# Patient Record
Sex: FEMALE | Race: WHITE | NOT HISPANIC OR LATINO | Employment: OTHER | URBAN - METROPOLITAN AREA
[De-identification: names, ages, dates, MRNs, and addresses within clinical notes are randomized per-mention and may not be internally consistent; named-entity substitution may affect disease eponyms.]

---

## 2018-01-08 ENCOUNTER — OFFICE VISIT CONVERTED (OUTPATIENT)
Dept: FAMILY MEDICINE CLINIC | Facility: CLINIC | Age: 73
End: 2018-01-08
Attending: FAMILY MEDICINE

## 2018-01-08 ENCOUNTER — CONVERSION ENCOUNTER (OUTPATIENT)
Dept: FAMILY MEDICINE CLINIC | Facility: CLINIC | Age: 73
End: 2018-01-08

## 2018-02-19 ENCOUNTER — OFFICE VISIT CONVERTED (OUTPATIENT)
Dept: PODIATRY | Facility: CLINIC | Age: 73
End: 2018-02-19
Attending: PODIATRIST

## 2018-07-17 ENCOUNTER — OFFICE VISIT CONVERTED (OUTPATIENT)
Dept: FAMILY MEDICINE CLINIC | Facility: CLINIC | Age: 73
End: 2018-07-17
Attending: FAMILY MEDICINE

## 2018-11-12 ENCOUNTER — OFFICE VISIT CONVERTED (OUTPATIENT)
Dept: OTOLARYNGOLOGY | Facility: CLINIC | Age: 73
End: 2018-11-12
Attending: OTOLARYNGOLOGY

## 2019-01-18 ENCOUNTER — OFFICE VISIT CONVERTED (OUTPATIENT)
Dept: FAMILY MEDICINE CLINIC | Facility: CLINIC | Age: 74
End: 2019-01-18
Attending: FAMILY MEDICINE

## 2019-03-01 ENCOUNTER — HOSPITAL ENCOUNTER (OUTPATIENT)
Dept: LAB | Facility: HOSPITAL | Age: 74
Discharge: HOME | End: 2019-03-01
Attending: FAMILY MEDICINE

## 2019-03-01 LAB
ANION GAP SERPL CALC-SCNC: 18 MMOL/L (ref 8–19)
BUN SERPL-MCNC: 16 MG/DL (ref 5–25)
BUN/CREAT SERPL: 29 {RATIO} (ref 6–20)
CALCIUM SERPL-MCNC: 9.2 MG/DL (ref 8.7–10.4)
CHLORIDE SERPL-SCNC: 102 MMOL/L (ref 99–111)
CONV CO2: 24 MMOL/L (ref 22–32)
CREAT UR-MCNC: 0.56 MG/DL (ref 0.5–0.9)
EST. AVERAGE GLUCOSE BLD GHB EST-MCNC: 186 MG/DL
GFR SERPLBLD BASED ON 1.73 SQ M-ARVRAT: >60 ML/MIN/{1.73_M2}
GLUCOSE SERPL-MCNC: 163 MG/DL (ref 65–99)
HBA1C MFR BLD: 8.1 % (ref 3.5–5.7)
OSMOLALITY SERPL CALC.SUM OF ELEC: 295 MOSM/KG (ref 273–304)
POTASSIUM SERPL-SCNC: 4.3 MMOL/L (ref 3.5–5.3)
SODIUM SERPL-SCNC: 140 MMOL/L (ref 135–147)
T4 FREE SERPL-MCNC: 1.4 NG/DL (ref 0.9–1.8)
TSH SERPL-ACNC: 2.18 M[IU]/L (ref 0.27–4.2)

## 2019-07-26 ENCOUNTER — OFFICE VISIT CONVERTED (OUTPATIENT)
Dept: FAMILY MEDICINE CLINIC | Facility: CLINIC | Age: 74
End: 2019-07-26
Attending: NURSE PRACTITIONER

## 2019-07-30 ENCOUNTER — HOSPITAL ENCOUNTER (OUTPATIENT)
Dept: LAB | Facility: HOSPITAL | Age: 74
Discharge: HOME | End: 2019-07-30
Attending: NURSE PRACTITIONER

## 2019-07-30 LAB
CHOLEST SERPL-MCNC: 170 MG/DL (ref 107–200)
CHOLEST/HDLC SERPL: 3.3 {RATIO} (ref 3–6)
EST. AVERAGE GLUCOSE BLD GHB EST-MCNC: 194 MG/DL
HBA1C MFR BLD: 8.4 % (ref 3.5–5.7)
HDLC SERPL-MCNC: 51 MG/DL (ref 40–60)
LDLC SERPL CALC-MCNC: 82 MG/DL (ref 70–100)
TRIGL SERPL-MCNC: 187 MG/DL (ref 40–150)
VLDLC SERPL-MCNC: 37 MG/DL (ref 5–37)

## 2019-10-02 ENCOUNTER — HOSPITAL ENCOUNTER (OUTPATIENT)
Dept: SLEEP MEDICINE | Facility: HOSPITAL | Age: 74
Discharge: HOME | End: 2019-10-02
Attending: INTERNAL MEDICINE

## 2019-10-02 ENCOUNTER — OUTSIDE FACILITY SERVICE (OUTPATIENT)
Dept: SLEEP MEDICINE | Facility: HOSPITAL | Age: 74
End: 2019-10-02

## 2019-10-02 PROCEDURE — 99203 OFFICE O/P NEW LOW 30 MIN: CPT | Performed by: INTERNAL MEDICINE

## 2019-11-11 ENCOUNTER — OFFICE VISIT CONVERTED (OUTPATIENT)
Dept: OTOLARYNGOLOGY | Facility: CLINIC | Age: 74
End: 2019-11-11
Attending: OTOLARYNGOLOGY

## 2020-01-22 ENCOUNTER — CONVERSION ENCOUNTER (OUTPATIENT)
Dept: FAMILY MEDICINE CLINIC | Facility: CLINIC | Age: 75
End: 2020-01-22

## 2020-01-22 ENCOUNTER — OFFICE VISIT CONVERTED (OUTPATIENT)
Dept: FAMILY MEDICINE CLINIC | Facility: CLINIC | Age: 75
End: 2020-01-22
Attending: NURSE PRACTITIONER

## 2020-02-21 ENCOUNTER — HOSPITAL ENCOUNTER (OUTPATIENT)
Dept: LAB | Facility: HOSPITAL | Age: 75
Discharge: HOME | End: 2020-02-21
Attending: NURSE PRACTITIONER

## 2020-02-21 LAB
ALBUMIN SERPL-MCNC: 4.2 G/DL (ref 3.5–5)
ALBUMIN/GLOB SERPL: 1.4 {RATIO} (ref 1.4–2.6)
ALP SERPL-CCNC: 106 U/L (ref 43–160)
ALT SERPL-CCNC: 33 U/L (ref 10–40)
ANION GAP SERPL CALC-SCNC: 21 MMOL/L (ref 8–19)
AST SERPL-CCNC: 26 U/L (ref 15–50)
BASOPHILS # BLD AUTO: 0.04 10*3/UL (ref 0–0.2)
BASOPHILS NFR BLD AUTO: 0.4 % (ref 0–3)
BILIRUB SERPL-MCNC: 0.35 MG/DL (ref 0.2–1.3)
BUN SERPL-MCNC: 13 MG/DL (ref 5–25)
BUN/CREAT SERPL: 22 {RATIO} (ref 6–20)
CALCIUM SERPL-MCNC: 9.4 MG/DL (ref 8.7–10.4)
CHLORIDE SERPL-SCNC: 102 MMOL/L (ref 99–111)
CONV ABS IMM GRAN: 0.03 10*3/UL (ref 0–0.2)
CONV CO2: 22 MMOL/L (ref 22–32)
CONV CREATININE URINE, RANDOM: 120.9 MG/DL (ref 10–300)
CONV IMMATURE GRAN: 0.3 % (ref 0–1.8)
CONV MICROALBUM.,U,RANDOM: 15.9 MG/L (ref 0–20)
CONV TOTAL PROTEIN: 7.1 G/DL (ref 6.3–8.2)
CREAT UR-MCNC: 0.6 MG/DL (ref 0.5–0.9)
DEPRECATED RDW RBC AUTO: 45.1 FL (ref 36.4–46.3)
EOSINOPHIL # BLD AUTO: 0.14 10*3/UL (ref 0–0.7)
EOSINOPHIL # BLD AUTO: 1.6 % (ref 0–7)
ERYTHROCYTE [DISTWIDTH] IN BLOOD BY AUTOMATED COUNT: 13.6 % (ref 11.7–14.4)
EST. AVERAGE GLUCOSE BLD GHB EST-MCNC: 197 MG/DL
GFR SERPLBLD BASED ON 1.73 SQ M-ARVRAT: >60 ML/MIN/{1.73_M2}
GLOBULIN UR ELPH-MCNC: 2.9 G/DL (ref 2–3.5)
GLUCOSE SERPL-MCNC: 183 MG/DL (ref 65–99)
HBA1C MFR BLD: 8.5 % (ref 3.5–5.7)
HCT VFR BLD AUTO: 46.4 % (ref 37–47)
HGB BLD-MCNC: 14.6 G/DL (ref 12–16)
LYMPHOCYTES # BLD AUTO: 3.94 10*3/UL (ref 1–5)
LYMPHOCYTES NFR BLD AUTO: 44.1 % (ref 20–45)
MCH RBC QN AUTO: 28.2 PG (ref 27–31)
MCHC RBC AUTO-ENTMCNC: 31.5 G/DL (ref 33–37)
MCV RBC AUTO: 89.6 FL (ref 81–99)
MICROALBUMIN/CREAT UR: 13.2 MG/G{CRE} (ref 0–35)
MONOCYTES # BLD AUTO: 0.67 10*3/UL (ref 0.2–1.2)
MONOCYTES NFR BLD AUTO: 7.5 % (ref 3–10)
NEUTROPHILS # BLD AUTO: 4.11 10*3/UL (ref 2–8)
NEUTROPHILS NFR BLD AUTO: 46.1 % (ref 30–85)
NRBC CBCN: 0 % (ref 0–0.7)
OSMOLALITY SERPL CALC.SUM OF ELEC: 297 MOSM/KG (ref 273–304)
PLATELET # BLD AUTO: 312 10*3/UL (ref 130–400)
PMV BLD AUTO: 9.1 FL (ref 9.4–12.3)
POTASSIUM SERPL-SCNC: 4 MMOL/L (ref 3.5–5.3)
RBC # BLD AUTO: 5.18 10*6/UL (ref 4.2–5.4)
SODIUM SERPL-SCNC: 141 MMOL/L (ref 135–147)
TSH SERPL-ACNC: 2.46 M[IU]/L (ref 0.27–4.2)
WBC # BLD AUTO: 8.93 10*3/UL (ref 4.8–10.8)

## 2020-05-27 ENCOUNTER — OFFICE VISIT CONVERTED (OUTPATIENT)
Dept: FAMILY MEDICINE CLINIC | Facility: CLINIC | Age: 75
End: 2020-05-27
Attending: NURSE PRACTITIONER

## 2020-06-10 ENCOUNTER — HOSPITAL ENCOUNTER (OUTPATIENT)
Dept: LAB | Facility: HOSPITAL | Age: 75
Discharge: HOME | End: 2020-06-10
Attending: NURSE PRACTITIONER

## 2020-06-10 LAB
CHOLEST SERPL-MCNC: 152 MG/DL (ref 107–200)
CHOLEST/HDLC SERPL: 3.3 {RATIO} (ref 3–6)
EST. AVERAGE GLUCOSE BLD GHB EST-MCNC: 217 MG/DL
HBA1C MFR BLD: 9.2 % (ref 3.5–5.7)
HDLC SERPL-MCNC: 46 MG/DL (ref 40–60)
LDLC SERPL CALC-MCNC: 79 MG/DL (ref 70–100)
TRIGL SERPL-MCNC: 136 MG/DL (ref 40–150)
VLDLC SERPL-MCNC: 27 MG/DL (ref 5–37)

## 2020-08-06 ENCOUNTER — OFFICE VISIT CONVERTED (OUTPATIENT)
Dept: FAMILY MEDICINE CLINIC | Facility: CLINIC | Age: 75
End: 2020-08-06
Attending: NURSE PRACTITIONER

## 2020-09-30 ENCOUNTER — HOSPITAL ENCOUNTER (OUTPATIENT)
Dept: LAB | Facility: HOSPITAL | Age: 75
Discharge: HOME | End: 2020-09-30
Attending: NURSE PRACTITIONER

## 2020-09-30 LAB
ALBUMIN SERPL-MCNC: 4.2 G/DL (ref 3.5–5)
ALBUMIN/GLOB SERPL: 1.5 {RATIO} (ref 1.4–2.6)
ALP SERPL-CCNC: 104 U/L (ref 43–160)
ALT SERPL-CCNC: 33 U/L (ref 10–40)
ANION GAP SERPL CALC-SCNC: 17 MMOL/L (ref 8–19)
AST SERPL-CCNC: 26 U/L (ref 15–50)
BILIRUB SERPL-MCNC: 0.29 MG/DL (ref 0.2–1.3)
BUN SERPL-MCNC: 13 MG/DL (ref 5–25)
BUN/CREAT SERPL: 20 {RATIO} (ref 6–20)
CALCIUM SERPL-MCNC: 9.4 MG/DL (ref 8.7–10.4)
CHLORIDE SERPL-SCNC: 103 MMOL/L (ref 99–111)
CONV CO2: 24 MMOL/L (ref 22–32)
CONV TOTAL PROTEIN: 7 G/DL (ref 6.3–8.2)
CREAT UR-MCNC: 0.64 MG/DL (ref 0.5–0.9)
EST. AVERAGE GLUCOSE BLD GHB EST-MCNC: 186 MG/DL
GFR SERPLBLD BASED ON 1.73 SQ M-ARVRAT: >60 ML/MIN/{1.73_M2}
GLOBULIN UR ELPH-MCNC: 2.8 G/DL (ref 2–3.5)
GLUCOSE SERPL-MCNC: 153 MG/DL (ref 65–99)
HBA1C MFR BLD: 8.1 % (ref 3.5–5.7)
OSMOLALITY SERPL CALC.SUM OF ELEC: 293 MOSM/KG (ref 273–304)
POTASSIUM SERPL-SCNC: 3.8 MMOL/L (ref 3.5–5.3)
SODIUM SERPL-SCNC: 140 MMOL/L (ref 135–147)
TSH SERPL-ACNC: 0.89 M[IU]/L (ref 0.27–4.2)

## 2020-11-02 ENCOUNTER — HOSPITAL ENCOUNTER (OUTPATIENT)
Dept: SLEEP MEDICINE | Facility: HOSPITAL | Age: 75
Discharge: HOME | End: 2020-11-02
Attending: INTERNAL MEDICINE

## 2020-11-02 ENCOUNTER — OUTSIDE FACILITY SERVICE (OUTPATIENT)
Dept: SLEEP MEDICINE | Facility: HOSPITAL | Age: 75
End: 2020-11-02

## 2020-11-02 PROCEDURE — 99213 OFFICE O/P EST LOW 20 MIN: CPT | Performed by: INTERNAL MEDICINE

## 2021-01-19 ENCOUNTER — HOSPITAL ENCOUNTER (OUTPATIENT)
Dept: OTHER | Facility: HOSPITAL | Age: 76
Discharge: HOME | End: 2021-01-19
Attending: INTERNAL MEDICINE

## 2021-02-12 ENCOUNTER — HOSPITAL ENCOUNTER (OUTPATIENT)
Dept: VACCINE CLINIC | Facility: HOSPITAL | Age: 76
Discharge: HOME | End: 2021-02-12
Attending: INTERNAL MEDICINE

## 2021-03-18 ENCOUNTER — CONVERSION ENCOUNTER (OUTPATIENT)
Dept: FAMILY MEDICINE CLINIC | Facility: CLINIC | Age: 76
End: 2021-03-18

## 2021-03-18 ENCOUNTER — OFFICE VISIT CONVERTED (OUTPATIENT)
Dept: FAMILY MEDICINE CLINIC | Facility: CLINIC | Age: 76
End: 2021-03-18
Attending: NURSE PRACTITIONER

## 2021-03-18 LAB
AMPHET UR QL CFM: NEGATIVE
BARBITURATES UR QL: NEGATIVE
BENZODIAZ UR QL SCN: NEGATIVE
CONV AMP/METHAMP UR: NEGATIVE
CONV COCAINE, UR: NEGATIVE
MDMA UR QL SCN: NEGATIVE
METHADONE UR QL SCN: NEGATIVE
OPIATES UR QL SCN: NEGATIVE
OXYCODONE UR QL SCN: NEGATIVE
PCP UR QL: NEGATIVE
THC SERPLBLD CFM-MCNC: NEGATIVE NG/ML

## 2021-04-01 ENCOUNTER — HOSPITAL ENCOUNTER (OUTPATIENT)
Dept: LAB | Facility: HOSPITAL | Age: 76
Discharge: HOME | End: 2021-04-01
Attending: NURSE PRACTITIONER

## 2021-04-01 LAB
25(OH)D3 SERPL-MCNC: 34.6 NG/ML (ref 30–100)
ALBUMIN SERPL-MCNC: 4.3 G/DL (ref 3.5–5)
ALBUMIN/GLOB SERPL: 1.3 {RATIO} (ref 1.4–2.6)
ALP SERPL-CCNC: 98 U/L (ref 43–160)
ALT SERPL-CCNC: 24 U/L (ref 10–40)
ANION GAP SERPL CALC-SCNC: 14 MMOL/L (ref 8–19)
AST SERPL-CCNC: 21 U/L (ref 15–50)
BASOPHILS # BLD AUTO: 0.05 10*3/UL (ref 0–0.2)
BASOPHILS NFR BLD AUTO: 0.5 % (ref 0–3)
BILIRUB SERPL-MCNC: 0.52 MG/DL (ref 0.2–1.3)
BUN SERPL-MCNC: 16 MG/DL (ref 5–25)
BUN/CREAT SERPL: 27 {RATIO} (ref 6–20)
CALCIUM SERPL-MCNC: 9.4 MG/DL (ref 8.7–10.4)
CHLORIDE SERPL-SCNC: 105 MMOL/L (ref 99–111)
CHOLEST SERPL-MCNC: 171 MG/DL (ref 107–200)
CHOLEST/HDLC SERPL: 2.7 {RATIO} (ref 3–6)
CONV ABS IMM GRAN: 0.03 10*3/UL (ref 0–0.2)
CONV CO2: 26 MMOL/L (ref 22–32)
CONV CREATININE URINE, RANDOM: 93.5 MG/DL (ref 10–300)
CONV IMMATURE GRAN: 0.3 % (ref 0–1.8)
CONV MICROALBUM.,U,RANDOM: <12 MG/L (ref 0–20)
CONV TOTAL PROTEIN: 7.5 G/DL (ref 6.3–8.2)
CREAT UR-MCNC: 0.6 MG/DL (ref 0.5–0.9)
DEPRECATED RDW RBC AUTO: 44.1 FL (ref 36.4–46.3)
EOSINOPHIL # BLD AUTO: 0.09 10*3/UL (ref 0–0.7)
EOSINOPHIL # BLD AUTO: 0.9 % (ref 0–7)
ERYTHROCYTE [DISTWIDTH] IN BLOOD BY AUTOMATED COUNT: 13.7 % (ref 11.7–14.4)
EST. AVERAGE GLUCOSE BLD GHB EST-MCNC: 166 MG/DL
GFR SERPLBLD BASED ON 1.73 SQ M-ARVRAT: >60 ML/MIN/{1.73_M2}
GLOBULIN UR ELPH-MCNC: 3.2 G/DL (ref 2–3.5)
GLUCOSE SERPL-MCNC: 133 MG/DL (ref 65–99)
HBA1C MFR BLD: 7.4 % (ref 3.5–5.7)
HCT VFR BLD AUTO: 47.7 % (ref 37–47)
HDLC SERPL-MCNC: 64 MG/DL (ref 40–60)
HGB BLD-MCNC: 15.3 G/DL (ref 12–16)
LDLC SERPL CALC-MCNC: 82 MG/DL (ref 70–100)
LYMPHOCYTES # BLD AUTO: 4.02 10*3/UL (ref 1–5)
LYMPHOCYTES NFR BLD AUTO: 41.9 % (ref 20–45)
MCH RBC QN AUTO: 28.2 PG (ref 27–31)
MCHC RBC AUTO-ENTMCNC: 32.1 G/DL (ref 33–37)
MCV RBC AUTO: 87.8 FL (ref 81–99)
MICROALBUMIN/CREAT UR: 12.8 MG/G{CRE} (ref 0–35)
MONOCYTES # BLD AUTO: 0.64 10*3/UL (ref 0.2–1.2)
MONOCYTES NFR BLD AUTO: 6.7 % (ref 3–10)
NEUTROPHILS # BLD AUTO: 4.77 10*3/UL (ref 2–8)
NEUTROPHILS NFR BLD AUTO: 49.7 % (ref 30–85)
NRBC CBCN: 0 % (ref 0–0.7)
OSMOLALITY SERPL CALC.SUM OF ELEC: 295 MOSM/KG (ref 273–304)
PLATELET # BLD AUTO: 341 10*3/UL (ref 130–400)
PMV BLD AUTO: 8.9 FL (ref 9.4–12.3)
POTASSIUM SERPL-SCNC: 3.8 MMOL/L (ref 3.5–5.3)
RBC # BLD AUTO: 5.43 10*6/UL (ref 4.2–5.4)
SODIUM SERPL-SCNC: 141 MMOL/L (ref 135–147)
TRIGL SERPL-MCNC: 123 MG/DL (ref 40–150)
TSH SERPL-ACNC: 1.05 M[IU]/L (ref 0.27–4.2)
VIT B12 SERPL-MCNC: 419 PG/ML (ref 211–911)
VLDLC SERPL-MCNC: 25 MG/DL (ref 5–37)
WBC # BLD AUTO: 9.6 10*3/UL (ref 4.8–10.8)
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
## 2021-05-13 NOTE — PROGRESS NOTES
Progress Note      Patient Name: Belem Acosta   Patient ID: [id number]   Sex: Female   YOB: 1945    Primary Care Provider: Elena BRUMFIELD   Referring Provider: Elena BRUMFIELD    Visit Date: May 27, 2020    Provider: BRISSA Carey   Location: [facility]   Location Address: [address] [place] [address], [address]  [place]  [id number]   Location Phone: [phone]          Chief Complaint  · Annual Wellness Exam      History Of Present Illness  The patient is a 74 year old /White female who is presenting for evaluation via video conferencing for her Annual Wellness Visit. Verbal consent obtained before beginning visit.   The following staff were present during the visit: ELENA BRUMFIELD   Her Primary Care Provider is Elena BRUMFIELD. Her comprehensive Care Team list, including suppliers, has been updated on the Facesheet. Her medical/family history, height, weight, BMI, and blood pressure have been reviewed and are in the chart. The Health Risk Assessment has been completed and scanned in the chart.   Medications are listed in the medication list.   The active problem list includes: Ankle pain, Anxiety, Arthritis, Bunion, Corns and callus, Diabetes Mellitus, Type II, Hammertoe, Hyperglycemia, Hyperlipidemia, Hypertension, Hypothyroidism, Impaired fasting glucose, Panic Disorder, and Sleep apnea   The patient does not have a history of substance use.   Patient reports her diet is adequate.   The Mini-Cog has been administered and is scanned in chart. The results are negative. Her cognitive function is without limitation.   A hearing loss screen was completed today and the result is negative.   Patient does not have any risk factors for depression. Patient completed the PHQ-9 today and it has been scanned in the chart. The total score is 5-9.   The Get Up and Go screen was administered today and the result is negative.   The Cortes Index of [place] in ADLs indicated full  function (score of 6).   A Falls Risk Assessment has been completed, including a review of home fall hazards and medication review.   Overall, the patient's functional ability is noted by this provider to be within normal limits. Her level of safety is noted to be within normal limits. Her balance/gait is within normal limits. There have been no falls in the past year. Patient-specific home safety recommendations have been reviewed and a copy has been given to patient.   She denies issues with leaking urine.   There are no additional risk factors identified.   Living Will/Advanced Directive has not previously been completed.   Personalized health advice was given to the patient and a written health screening schedule was established; see Plan for details.   Belem Acosta is a 74 year old /White female who presents for evaluation and treatment of:      mammogram keila tomorrow.    colonoscopy-Feb 23, 2017 -small polyp-she will need another one in 10yrs.    last eye exam-overdue-it was cancelled due to COVID-will shanique in July 7, 2020 w/.     due A1C -will order today.       Past Medical History  Disease Name Date Onset Notes   Ankle pain --  --    Anxiety --  --    Arthritis --  --    Bone Density Screening 03/2019 NORMAL   Bunion --  --    Corns and callus --  --    Diabetes Mellitus, Type II --  --    Hammertoe --  --    Hyperglycemia --  --    Hyperlipidemia --  --    Hypertension --  --    Hypothyroidism --  --    Impaired fasting glucose 08/19/2014 --    Panic Disorder --  --    Pap smear for cervical cancer screening 2019 NL PER PT   Screening Mammogram 03/2019 NORMAL   Sleep apnea 08/26/2015 --          Past Surgical History  Procedure Name Date Notes   carpal tunnel 2002, 2004 --    Cataract exctraction with lens implant 2012 right eye   Cholecystectomy 1982 --    Colonoscopy 02/2017 POLYPS, TUBULAR ADENOMA   Repair of rotator cuff, left 06/2013 --    Thyroidectomy-partial 1997 Rt  thyroid d/t growth-bengin   Wrist 2008 --          Medication List  Name Date Started Instructions   Allegra 180 mg oral tablet  take 1 tablet (180 mg) by oral route once daily   Aspirin Low Dose 81 mg oral tablet,delayed release (DR/EC)  take 1 tablet (81 mg) by oral route once daily   bisoprolol fumarate 5 mg oral tablet 01/23/2020 TAKE ONE-HALF TABLET (2.5 MG) BY ORAL ROUTE ONCE DAILY FOR 90 DAYS   Farxiga 10 mg oral tablet 01/23/2020 take 1 tablet (10 mg) by oral route once daily in the morning for 90 days   fluticasone propionate 50 mcg/actuation nasal spray,suspension 01/23/2020 SPRAY 2 SPRAYS (100 MCG) IN EACH NOSTRIL BY INTRANASAL ROUTE ONCE DAILY AS NEEDED FOR 90 DAYS   ipratropium bromide 42 mcg (0.06 %) nasal spray,non-aerosol 01/23/2020 SPRAY 2 SPRAYS IN EACH NOSTRIL BY INTRANASAL ROUTE 3 TIMES PER DAY PRN   levothyroxine 112 mcg oral tablet 01/23/2020 TAKE 1 TABLET DAILY IN THE MORNING   Lumigan 0.01 % ophthalmic drops  instill 1 drop in affected eye once a day (at bedtime)   OneTouch Verio miscellaneous strip 01/08/2018 use as directed   Onglyza 5 mg oral tablet 01/23/2020 TAKE 1 TABLET (5 MG) BY ORAL ROUTE ONCE DAILY FOR 90 DAYS   paroxetine HCl 20 mg oral tablet 01/23/2020 TAKE 1 TABLET DAILY AT BEDTIME for 90 days   simvastatin 40 mg oral tablet 01/23/2020 TAKE 1 TABLET DAILY   Xanax 0.25 mg oral tablet 01/09/2017 take 0.5 tablet by oral route daily as needed for 30 days         Allergy List  Allergen Name Date Reaction Notes   NO KNOWN DRUG ALLERGIES --  --  --        Allergies Reconciled  Family Medical History  Disease Name Relative/Age Notes   Emphysema Father/   --    Pancreatic Neoplasm, Malignant Brother/   --    Stroke Aunt/   --    Heart Disease Aunt/  Father/  Grandfather (maternal)/  Grandmother (maternal)/  Mother/   --    Hyperlipidemia Brother/  Sister/   --    Congestive Heart Failure Father/  Mother/   --    Hypertension Mother/   --    Diabetes, unspecified type Mother/   --           Reproductive History  Menstrual   Age Menarche: 13   Pregnancy Summary   Total Pregnancies: 2 Full Term: 2 Premature: 0   Ab Induced: 0 Ab Spontaneous: 0 Ectopics: 0   Multiples: 0 Livin         Social History  Finding Status Start/Stop Quantity Notes   Alcohol Never --/-- --  2020 - 2020 - 2019 -     --  --/-- --  --    Moderate Amount of Exercise (1-3 times weekly) --  --/-- --  --    No known infection risk --  --/-- --  --    Tobacco Never --/-- --  --          Immunizations  NameDate Admin Mfg Trade Name Lot Number Route Inj VIS Given VIS Publication   [id number] PMC BOOSTRIX [id number] IM LD 2013    Comments: Pt tolerated well   Hepatitis A02019 NE Not Entered  NE NE 2019    Comments: PER PATIENT [facility].   Hepatitis A02019 NE Not Entered  NE NE 2019    Comments: PER PT AT [facility].   Vkahzekpk20/2019 SKB Fluzone Quadrivalent  NE NE 2020    Comments: [facility]   Bziemejxx28/2018 NE Not Entered  NE NE 2019    Comments: PER PATIENT   Dnrynarax49/28/2013 NE Not Entered [id number] IM NE 2013   Comments:    Dprkbsitcacu35/09/2013 PFR PREVNAR 13 [id number] IM RD 2013 10/06/2009   Comments: Pt tolerated well   Td04 NE Not Entered  IM NE 2013   Comments:    Tdap2016 NE Not Entered  NE NE 2019    Comments: PER PATIENT         Review of Systems  · Constitutional  o Denies  o : fatigue, night sweats  · Eyes  o Denies  o : double vision, blurred vision  · HENT  o Denies  o : vertigo, recent head injury  · Breasts  o Denies  o : abnormal changes in breast size, additional breast symptoms except as noted in the HPI  · Cardiovascular  o Denies  o : chest pain, irregular heart beats  · Respiratory  o Denies  o : shortness of breath, productive cough  · Gastrointestinal  o Denies  o : nausea, vomiting  · Genitourinary  o Denies  o : dysuria, urinary  retention  · Integument  o Denies  o : hair growth change, new skin lesions  · Neurologic  o Denies  o : altered mental status, seizures  · Musculoskeletal  o Denies  o : joint swelling, limitation of motion  · Endocrine  o Denies  o : cold intolerance, heat intolerance  · Heme-Lymph  o Denies  o : petechiae, lymph node enlargement or tenderness  · Allergic-Immunologic  o Denies  o : frequent illnesses          Assessment  · Encounter for Medicare annual wellness exam     V70.0/Z00.00  · Screening for depression     V79.0/Z13.89  · Screening for alcoholism     V79.1/Z13.39  · Screening for alcoholism     V79.1/Z13.89      Plan  · Orders  o Falls Risk Assessment Completed (3288F) - V70.0/Z00.00 - 05/27/2020  o Brief hearing screening (written) [facility] () - V70.0/Z00.00 - 05/27/2020  o Presence or absence of urinary incontinence assessed (RODRIGO) (1090F) - V70.0/Z00.00 - 05/27/2020  o Annual depression screening using the PHQ-9 tool, 15 minutes (, 09665) - V79.0/Z13.89 - 05/27/2020  o Annual alcohol screening using the AUDIT-C tool, 15 minutes [facility] (96962, ) - V79.1/Z13.39 - 05/27/2020  o ACO-39: Current medications updated and reviewed () - - 05/27/2020  · Medications  o Medications have been Reconciled  o Transition of Care or Provider Policy  · Instructions  o Health Risk Assessment has been reviewed with the patient.  o Written health screening schedule for next 5-10 years was established with patient; information scanned in chart and given/mailed to patient.  o Fall prevention methods discussed and a copy of recommendations given/mailed to patient.  o Depression Screen completed and scanned into the EMR under the designated folder within the patient's documents.  o Today's PHQ-9 result is _5__  o Audit-C Questionnaire completed and scanned into the EMR under the designated folder within the patient's documents.  o Audit-C score of 0-4 - Negative Screen - Brief Discussion  o Take all medications as  prescribed/directed.  o Patient was educated/instructed on their diagnosis, treatment and medications prior to discharge from the clinic today.            Electronically Signed by: BRISSA Carey -Author on May 31, 2020 10:46:03 AM

## 2021-05-13 NOTE — PROGRESS NOTES
Progress Note      Patient Name: Belem Acosta   Patient ID: [id number]   Sex: Female   YOB: 1945    Primary Care Provider: Elena BRUMFIELD   Referring Provider: Elena BRUMFIELD    Visit Date: May 27, 2020    Provider: BRISSA Carey   Location: [facility]   Location Address: [address] [place] [address], [address]  [place]  [id number]   Location Phone: [phone]          Chief Complaint  · F/U      History Of Present Illness  Video Conferencing Visit  Belem Acosta is a 74 year old /White female who is presenting for evaluation via video conferencing via DUO VIDEO. Verbal consent obtained before beginning visit.   The following staff were present during this visit: Elena BRUMFIELD   Belem Acosta is a 74 year old /White female who presents for evaluation and treatment of:      Patient is here today on video call for a follow up.    reports it has been a hard year-it will be a yr Sunday since her  Chase passed away. States she is having more good days than bad days. She is moving intor her condo next week. States she loves her new condo.     DM-blood sugars running 169 today. States it was going down but it inched up a little since COVID. Reports her glucose elevates if she does not sleep well. Reports mostly she gets more reg sleep. She may trouble sleeping 1-2 nights per wk.     hypothyroid- denies issues w/thyroid. Last tsh wnl.    depression-doing well on current dose of paroxetine.    anxiety-she has not used her Xanax only 2-3 times this yr.     htn-b/p runs around 120-130's/70's.     hyperlipidemia-denies myopathies from statin.    arthritis-reports it is under good control.     allegra-she has trouble w/tree pollen this yr-she is taking allegra which is managing her symptoms.     mammogram keila tomorrow.    colonoscopy-Feb 23, 2017 -small polyp-she will need another one in 10yrs.    last eye exam-overdue-it was cancelled due to COVID-will shanique  in July 7, 2020 w/.     30 min spent via Duo video.           Past Medical History  Disease Name Date Onset Notes   Ankle pain --  --    Anxiety --  --    Arthritis --  --    Bone Density Screening 03/2019 NORMAL   Bunion --  --    Corns and callus --  --    Diabetes Mellitus, Type II --  --    Hammertoe --  --    Hyperglycemia --  --    Hyperlipidemia --  --    Hypertension --  --    Hypothyroidism --  --    Impaired fasting glucose 08/19/2014 --    Panic Disorder --  --    Pap smear for cervical cancer screening 2019 NL PER PT   Screening Mammogram 03/2019 NORMAL   Sleep apnea 08/26/2015 --          Past Surgical History  Procedure Name Date Notes   carpal tunnel 2002, 2004 --    Cataract exctraction with lens implant 2012 right eye   Cholecystectomy 1982 --    Colonoscopy 02/2017 POLYPS, TUBULAR ADENOMA   Repair of rotator cuff, left 06/2013 --    Thyroidectomy-partial 1997 Rt thyroid d/t growth-bengin   Wrist 2008 --          Medication List  Name Date Started Instructions   Allegra 180 mg oral tablet  take 1 tablet (180 mg) by oral route once daily   Aspirin Low Dose 81 mg oral tablet,delayed release (/EC)  take 1 tablet (81 mg) by oral route once daily   bisoprolol fumarate 5 mg oral tablet 01/23/2020 TAKE ONE-HALF TABLET (2.5 MG) BY ORAL ROUTE ONCE DAILY FOR 90 DAYS   Farxiga 10 mg oral tablet 01/23/2020 take 1 tablet (10 mg) by oral route once daily in the morning for 90 days   fluticasone propionate 50 mcg/actuation nasal spray,suspension 01/23/2020 SPRAY 2 SPRAYS (100 MCG) IN EACH NOSTRIL BY INTRANASAL ROUTE ONCE DAILY AS NEEDED FOR 90 DAYS   ipratropium bromide 42 mcg (0.06 %) nasal spray,non-aerosol 01/23/2020 SPRAY 2 SPRAYS IN EACH NOSTRIL BY INTRANASAL ROUTE 3 TIMES PER DAY PRN   levothyroxine 112 mcg oral tablet 01/23/2020 TAKE 1 TABLET DAILY IN THE MORNING   Lumigan 0.01 % ophthalmic drops  instill 1 drop in affected eye once a day (at bedtime)   OneTouch Verio miscellaneous strip  2018 use as directed   Onglyza 5 mg oral tablet 2020 TAKE 1 TABLET (5 MG) BY ORAL ROUTE ONCE DAILY FOR 90 DAYS   paroxetine HCl 20 mg oral tablet 2020 TAKE 1 TABLET DAILY AT BEDTIME for 90 days   simvastatin 40 mg oral tablet 2020 TAKE 1 TABLET DAILY   Xanax 0.25 mg oral tablet 2017 take 0.5 tablet by oral route daily as needed for 30 days         Allergy List  Allergen Name Date Reaction Notes   NO KNOWN DRUG ALLERGIES --  --  --          Family Medical History  Disease Name Relative/Age Notes   Emphysema Father/   --    Pancreatic Neoplasm, Malignant Brother/   --    Stroke Aunt/   --    Heart Disease Aunt/  Father/  Grandfather (maternal)/  Grandmother (maternal)/  Mother/   --    Hyperlipidemia Brother/  Sister/   --    Congestive Heart Failure Father/  Mother/   --    Hypertension Mother/   --    Diabetes, unspecified type Mother/   --          Reproductive History  Menstrual   Age Menarche: 13   Pregnancy Summary   Total Pregnancies: 2 Full Term: 2 Premature: 0   Ab Induced: 0 Ab Spontaneous: 0 Ectopics: 0   Multiples: 0 Livin         Social History  Finding Status Start/Stop Quantity Notes   Alcohol Never --/-- --  2020 - 2020 - 2019 -     --  --/-- --  --    Moderate Amount of Exercise (1-3 times weekly) --  --/-- --  --    No known infection risk --  --/-- --  --    Tobacco Never --/-- --  --          Immunizations  NameDate Admin Mfg Trade Name Lot Number Route Inj VIS Given VIS Publication   [id number] PMC BOOSTRIX [id number] IM LD 2013   Comments: Pt tolerated well   Hepatitis A02019 NE Not Entered  NE NE 2019    Comments: PER PATIENT [facility].   Hepatitis A02019 NE Not Entered  NE NE 2019    Comments: PER PT AT [facility].   Eazmhcvqw77/2019 SKB Fluzone Quadrivalent  NE NE 2020    Comments: [facility] prescription shop   Orszhjmrj82/2018 NE Not Entered  NE NE 2019     Comments: PER PATIENT   Hzwytoxnt06/28/2013 NE Not Entered [id number] IM NE 12/09/2013 07/02/2012   Comments:    Bdjnxabwaecu13/09/2013 PFR PREVNAR 13 [id number] IM RD 12/09/2013 10/06/2009   Comments: Pt tolerated well   Td04/05/2006 NE Not Entered  IM NE 12/09/2013 01/24/2012   Comments:    Tdap01/01/2016 NE Not Entered  NE NE 07/26/2019    Comments: PER PATIENT         Review of Systems  · Constitutional  o Denies  o : fever, fatigue, weight loss, weight gain  · Cardiovascular  o Denies  o : lower extremity edema, claudication, chest pressure, palpitations  · Respiratory  o Denies  o : shortness of breath, wheezing, cough, hemoptysis, dyspnea on exertion  · Gastrointestinal  o Denies  o : nausea, vomiting, diarrhea, constipation, abdominal pain  · Psychiatric  o Admits  o : anxiety, depression  o Denies  o : suicidal ideation, homicidal ideation      Physical Examination  · Constitutional  o Appearance  o : alert, in no acute distress, well developed, well-nourished  · Skin and Subcutaneous Tissue  o General Inspection  o : no rashes, normal skin color, warm and dry  · Neurologic  o Mental Status Examination  o : alert and oriented to time, place, and person. Gait and Station: normal gait, able to stand without difficulty  · Psychiatric  o Judgement and Insight  o : judgment and insight intact  o Mood and Affect  o : normal mood and affect          Assessment  · Allergic rhinitis due to allergen     477.9/J30.9  Admits to tree pollen flared her allergies earlier this year but her symptoms are under control at this time with Allegra.   · Anxiety disorder     300.00/F41.9  Anxiety under much better control-admits she is only had to use Xanax 2-3 times this year.  · Depression     311/F32.9  Mood stable on paroxetine -continue current dose.  · Hyperlipidemia     272.4/E78.5  denies myopathies from statin. Ordered lipid today.  · Hypothyroidism     244.9/E03.9  denies issues w/thyroid. Last tsh wnl. Ordered repeat  today.  · Hypertension     401.9/I10  Blood pressure within goal-continue current antihypertensives.  · Arthritis     V13.4/V13.4  Reports arthritis symptoms under good control at this time.  · Diabetes Mellitus, Type II     250.00/E11.9  Blood sugar 169 this morning. Reports since COVID her blood sugars have slowly creeped up. Also complains of insomnia at time-discussed relation of lack of sleep to increase glucose levels-ordered A1c today.       Plan  · Orders  o Hgb A1c [facility] (59346) - 250.00/E11.9 - 05/27/2020  o Lipid Panel [facility] (13195) - 272.4/E78.5 - 05/27/2020  o ACO-39: Current medications updated and reviewed () - - 05/27/2020  o ACO-14: Influenza immunization administered or previously received () - - 05/27/2020  · Medications  o Medications have been Reconciled  o Transition of Care or Provider Policy  · Instructions  o Advised that cheeses and other sources of dairy fats, animal fats, fast food, and the extras (candy, pastries, pies, doughnuts and cookies) all contain LDL raising nutrients. Advised to increase fruits, vegetables, whole grains, and to monitor portion sizes.   o Take all medications as prescribed/directed.  o Patient was educated/instructed on their diagnosis, treatment and medications prior to discharge from the clinic today.  o Call the office with any concerns or questions.  o Discussed Covid-19 precautions including, but not limited to, social distancing, avoid touching your face, and hand washing.   o 2 month follow up.            Electronically Signed by: BRISSA Carey -Author on May 31, 2020 11:03:14 AM

## 2021-05-14 VITALS
BODY MASS INDEX: 39.91 KG/M2 | WEIGHT: 225.25 LBS | HEIGHT: 63 IN | DIASTOLIC BLOOD PRESSURE: 57 MMHG | OXYGEN SATURATION: 96 % | SYSTOLIC BLOOD PRESSURE: 134 MMHG | HEART RATE: 80 BPM

## 2021-05-14 NOTE — PROGRESS NOTES
Progress Note      Patient Name: Belem Acosta   Patient ID: [id number]   Sex: Female   YOB: 1945    Primary Care Provider: Elena BRUMFIELD   Referring Provider: Elena BRUMFIELD    Visit Date: March 18, 2021    Provider: BRISSA Carey   Location: [facility]   Location Address: [address] [place] [address], [address]  [place], [place]  [id number]   Location Phone: [phone]          Chief Complaint  · 6 M F/U      History Of Present Illness  Belem Acosta is a 75 year old /White female who presents for evaluation and treatment of:      Patient is here today for 6 months follow up.    States he have been having a lot of sinus pressure and itchy eye lately. Says she is outside a lot. States she got her Covid-19 vaccines by [employer] with no side effects.     HTN-BP normal at home.    DM-BS runs in 130s-140s. Last A1C was 8.1%-her fasting blood sugars have greatly improved expect next A1C to be below 7%-ordered today as well as urine micro.    anxiety-Need refill on Xanax, UDS, Miguel and Narcotic Consent-all updated and refilled. States she is rarely having to use the Xanax now.    States she sleeps so much better-states if she does not sleep well it affects her blood sugar.    states her best friend was dx w/glioblatoma.       Past Medical History  Disease Name Date Onset Notes   Ankle pain --  --    Anxiety --  --    Arthritis --  --    Bone Density Screening 03/2019 NORMAL   Bunion --  --    Corns and callus --  --    Diabetes Mellitus, Type II --  --    Hammertoe --  --    Hyperglycemia --  --    Hyperlipidemia --  --    Hypertension --  --    Hypothyroidism --  --    Impaired fasting glucose 08/19/2014 --    Panic Disorder --  --    Pap smear for cervical cancer screening 2019 NL PER PT SEES DR[place] PALOMINO [place]   Screening Mammogram 05/2020 NORMAL DR. APLOMINO [place]   Sensorineural hearing loss (SNHL) of both ears --  --    Sleep apnea  "08/26/2015 --          Past Surgical History  Procedure Name Date Notes   carpal tunnel 2002, 2004 --    Cataract exctraction with lens implant 2012 right eye   Cholecystectomy 1982 --    Colonoscopy 02/2017 POLYPS, TUBULAR ADENOMA   Repair of rotator cuff, left 06/2013 --    Thyroidectomy-partial 1997 Rt thyroid d/t growth-bengin   Wrist 2008 --          Medication List  Name Date Started Instructions   Allegra 180 mg oral tablet  take 1 tablet (180 mg) by oral route once daily   Aspirin Low Dose 81 mg oral tablet,delayed release (DR/EC)  take 1 tablet (81 mg) by oral route once daily   BD Ultra-Fine [place] Pen Needle 32 gauge x 5/32\" miscellaneous needle 09/21/2020 USE AS DIRECTED   bisoprolol fumarate 5 mg oral tablet 03/18/2021 TAKE ONE-HALF TABLET (2.5 MG) BY ORAL ROUTE ONCE DAILY FOR 90 DAYS   Farxiga 10 mg oral tablet 03/18/2021 take 1 tablet (10 mg) by oral route once daily in the morning for 90 days   fluticasone propionate 50 mcg/actuation nasal spray,suspension 01/23/2020 SPRAY 2 SPRAYS (100 MCG) IN EACH NOSTRIL BY INTRANASAL ROUTE ONCE DAILY AS NEEDED FOR 90 DAYS   ipratropium bromide 42 mcg (0.06 %) nasal spray,non-aerosol 03/18/2021 SPRAY 2 SPRAYS IN EACH NOSTRIL BY INTRANASAL ROUTE 3 TIMES PER DAY PRN   levothyroxine 112 mcg oral tablet 03/02/2021 TAKE 1 TABLET DAILY IN THE MORNING   Lumigan 0.01 % ophthalmic drops  instill 1 drop in affected eye once a day (at bedtime)   OneTouch Verio miscellaneous strip 01/08/2018 use as directed   paroxetine HCl 20 mg oral tablet 03/02/2021 TAKE 1 TABLET DAILY AT BEDTIME for 90 days   pen needle, diabetic 32 gauge x 5/32\" miscellaneous needle 06/22/2020 use as directed   simvastatin 40 mg oral tablet 03/18/2021 TAKE 1 TABLET DAILY   Toujeo Max U-300 SoloStar 300 unit/mL (3 mL) subcutaneous insulin pen 03/18/2021 inject 30 units by subcutaneous route qd DX E11.9   Xanax 0.25 mg oral tablet 03/18/2021 take 0.5 tablet by oral route daily as needed for 30 days "         Allergy List  Allergen Name Date Reaction Notes   metformin --  --  --        Allergies Reconciled  Family Medical History  Disease Name Relative/Age Notes   Emphysema Father/   --    Pancreatic Neoplasm, Malignant Brother/   --    Stroke Aunt/   --    Heart Disease Aunt/  Father/  Grandfather (maternal)/  Grandmother (maternal)/  Mother/   --    Hyperlipidemia Brother/  Sister/   --    Congestive Heart Failure Father/  Mother/   --    Hypertension Mother/   --    Diabetes, unspecified type Mother/   --          Reproductive History  Menstrual   Age Menarche: 13   Pregnancy Summary   Total Pregnancies: 2 Full Term: 2 Premature: 0   Ab Induced: 0 Ab Spontaneous: 0 Ectopics: 0   Multiples: 0 Livin         Social History  Finding Status Start/Stop Quantity Notes   Alcohol Never --/-- --  2021 - 2020 - 2020 - 2020 - 2019 -     --  --/-- --  --    Moderate Amount of Exercise (1-3 times weekly) --  --/-- --  --    No known infection risk --  --/-- --  --    Tobacco Never --/-- --  --          Immunizations  NameDate Admin Mfg Trade Name Lot Number Route Inj VIS Given VIS Publication   [id number] PMC BOOSTRIX [id number] IM LD 2013    Comments: Pt tolerated well   Hepatitis A02019 NE Not Entered  NE NE 2019    Comments: PER PATIENT [facility].   Hepatitis A02019 NE Not Entered  NE NE 2019    Comments: PER PT AT [facility] PRES[facility].   Xrsofojbn12/2019 SKB Fluzone Quadrivalent  NE NE 2020    Comments: [facility]   Xnibthwrjjsc20/09/2013 PFR PREVNAR 13 [id number] IM RD 2013 10/06/2009   Comments: Pt tolerated well   Td2006 NE Not Entered  IM NE 2013   Comments:    Tdap2016 NE Not Entered  NE NE 2019    Comments: PER PATIENT         Review of Systems  · Constitutional  o Denies  o : fatigue, night sweats  · Eyes  o Admits  o : eye discomfort  o Denies  o : double vision, blurred  "vision  · HENT  o Admits  o : sinus pain, nasal congestion, nasal discharge, postnasal drip  o Denies  o : vertigo, recent head injury  · Breasts  o Denies  o : abnormal changes in breast size, additional breast symptoms except as noted in the HPI  · Cardiovascular  o Denies  o : chest pain, irregular heart beats  · Respiratory  o Denies  o : shortness of breath, productive cough  · Gastrointestinal  o Denies  o : nausea, vomiting  · Genitourinary  o Denies  o : dysuria, urinary retention  · Integument  o Denies  o : hair growth change, new skin lesions  · Neurologic  o Denies  o : altered mental status, seizures  · Musculoskeletal  o Denies  o : joint swelling, limitation of motion  · Endocrine  o Denies  o : cold intolerance, heat intolerance  · Heme-Lymph  o Denies  o : petechiae, lymph node enlargement or tenderness  · Allergic-Immunologic  o Denies  o : frequent illnesses      Vitals  Date Time BP Position Site L\R Cuff Size HR RR TEMP (F) WT  HT  BMI kg/m2 BSA m2 O2 Sat FR L/min FiO2 HC       03/18/2021 03:07 /57 Sitting    80 - R   225lbs 4oz 5'  3\" 39.9 2.13 96 %            Physical Examination  · Constitutional  o Appearance  o : alert, in no acute distress, well developed, well-nourished  · Ears, Nose, Mouth and Throat  o Ears  o : Ext. Ears: Normal shape, Non tender, EACs: Normal , TMs: Normal, Hearing: intact to conversational voice bilaterally  o Nose  o : Nasal discharge, nasal congestion  o Throat  o : Oropharynx: no inflmation or lesions, Tonsils: within normal limits  · Neck  o Thyroid  o : no thyomegaly, no palpabale masses   · Respiratory  o Auscultation of Lungs  o : normal breath sounds throughout, no wheeze, rhonchi, or crackles  · Cardiovascular  o Heart  o : Regular rate and rhythm, Normal S1,S2 , No cardiac murmers, No S3 or S4 gallop or rubs  · Skin and Subcutaneous Tissue  o General Inspection  o : no rashes, normal skin color, warm and dry  o Digits and Nails  o : no clubbing, " cyanosis, deformities or edema present, normal appearing nails  · Neurologic  o Mental Status Examination  o : alert and oriented to time, place, and person. Gait and Station: normal gait, able to stand without difficulty  · Psychiatric  o Judgement and Insight  o : judgment and insight intact  o Mood and Affect  o : normal mood and affect  · Left DM Foot Exam  o Sensation  o : normal sensory exam perceptible to 10-gram nylon monofilament exam (5/5), vibration and light touch.  o Visual Inspection  o : visual inspection is normal with no signs of breakdown, ulcerations or deformities unless otherwise noted.   o Vascular  o : palpable dorsalis pedis and posterir tibialis pulses present, normal capillary refill  · Right DM Foot Exam  o Sensation  o : normal sensory exam perceptible to 10-gram nylon monofilament exam (5/5), vibration and light touch.  o Visual Inspection  o : visual inspection is normal with no signs of breakdown, ulcerations or deformities unless otherwise noted.   o Vascular  o : palpable dorsalis pedis and posterir tibialis pulses present, normal capillary refill          Results  · In-Office Procedures  o Lab procedure  § IOP - Urine Drug Screen In-House [facility] (09246)   § Amphetamines Ur Ql: Negative   § Barbiturates Ur Ql: Negative   § Buprenorphine+Nor Ur Ql Scn: Negative   § Benzodiaz Ur Ql: Negative   § Cocaine Ur Ql: Negative   § Methadone Ur Ql: Negative   § Methamphet Ur Ql: Negative   § MDMA Ur Ql Scn: Negative   § Opiates Ur Ql: Negative   § Oxycodone Ur Ql: Negative   § PCP Ur Ql: Negative   § THC Ur Ql: Negative   § Temp in Range?: Within/Acceptable   § Control Seen?: Yes       Assessment  · Essential hypertension     401.9/I10  · Hyperlipidemia     272.4/E78.5  · Hypothyroidism     244.9/E03.9  · Other long term (current) drug therapy     V58.69/Z79.899  · Sleep apnea     780.57/G47.30  · Anxiety     300.02/F41.1  · Hypertension     401.9/I10  · Arthritis     V13.4/V13.4  · Diabetes  Mellitus, Type II     250.00/E11.9  · Routine lab draw     V72.60/Z01.89  · Sinusitis     473.9/J32.9      Plan  · Orders  o Hgb A1c [facility] (37593) - 250.00/E11.9 - 03/18/2021  o Physical, Primary Care Panel (CBC, CMP, Lipid, TSH) [facility] (07391, 62459, 04105, 84135) - 250.00/E11.9, 272.4/E78.5, 244.9/E03.9, 401.9/I10 - 03/18/2021  o Vitamin D (25-Hydroxy) Level (03156) - V72.60/Z01.89 - 03/18/2021  o JOSE Report (KASPR) - V58.69/Z79.899 - 03/18/2021  o ACO-15: Pneumococcal Vaccine Administered or Previously Received [facility] (4040F) - - 03/18/2021  o ACO-14: Influenza immunization administered or previously received [facility] () - - 03/18/2021  o ACO-19: Colorectal cancer screening results documented and reviewed (3017F) - - 02/23/2017  o ACO - Pt declines to or was not able to provide an Advance Care Plan or name a Surrogate Decision Maker (1124F) - - 03/18/2021  o ACO-39: Current medications updated and reviewed (, 1159F) - - 03/18/2021  o Diabetic Foot Exam (DMFOT) - 250.00/E11.9 - 03/18/2021  o Vitamin B12 level (61646) - V72.60/Z01.89 - 03/18/2021  o Urine microalbumin measurement (83542) - 250.00/E11.9 - 03/18/2021  · Medications  o Augmentin 875-125 mg oral tablet   SIG: take 1 tablet by oral route every 12 hours for 10 days   DISP: (20) Tablet with 0 refills  Prescribed on 03/18/2021     o Xanax 0.25 mg oral tablet   SIG: take 0.5 tablet by oral route daily as needed for 30 days   DISP: (30) Tablet with 0 refills  Adjusted on 03/18/2021     o bisoprolol fumarate 5 mg oral tablet   SIG: TAKE ONE-HALF TABLET (2.5 MG) BY ORAL ROUTE ONCE DAILY FOR 90 DAYS   DISP: (45) Tablet with 5 refills  Refilled on 03/18/2021     o Farxiga 10 mg oral tablet   SIG: take 1 tablet (10 mg) by oral route once daily in the morning for 90 days   DISP: (90) Tablet with 3 refills  Refilled on 03/18/2021     o ipratropium bromide 42 mcg (0.06 %) nasal spray,non-aerosol   SIG: SPRAY 2 SPRAYS IN EACH NOSTRIL BY INTRANASAL ROUTE 3 TIMES PER  DAY PRN   DISP: (3) Milliliter with 3 refills  Refilled on 03/18/2021     o simvastatin 40 mg oral tablet   SIG: TAKE 1 TABLET DAILY   DISP: (90) Tablet with 3 refills  Refilled on 03/18/2021     o Toujeo Max U-300 SoloStar 300 unit/mL (3 mL) subcutaneous insulin pen   SIG: inject 30 units by subcutaneous route qd DX E11.9   DISP: (3) Pen Needle with 3 refills  Refilled on 03/18/2021     o Medications have been Reconciled  o Transition of Care or Provider Policy  · Instructions  o Patient advised to monitor blood pressure (B/P) at home and journal readings. Patient informed that a B/P reading at home of more than 130/80 is considered hypertension. For readings greater txyr316/90 or higher patient is advised to follow up in the office with readings for management. Patient advised to limit sodium intake.  o Advised that cheeses and other sources of dairy fats, animal fats, fast food, and the extras (candy, pastries, pies, doughnuts and cookies) all contain LDL raising nutrients. Advised to increase fruits, vegetables, whole grains, and to monitor portion sizes.   o Take all medications as prescribed/directed.  o Patient was educated/instructed on their diagnosis, treatment and medications prior to discharge from the clinic today.  o Patient instructed to seek medical attention urgently for new or worsening symptoms.  o Call the office with any concerns or questions.  o 6 month follow up  o Electronically Identified Patient Education Materials Provided Electronically  · Disposition  o Call or Return if symptoms worsen or persist.            Electronically Signed by: BRISSA Carey -Author on March 22, 2021 11:01:34 AM

## 2021-05-15 VITALS
BODY MASS INDEX: 40.13 KG/M2 | OXYGEN SATURATION: 96 % | DIASTOLIC BLOOD PRESSURE: 60 MMHG | SYSTOLIC BLOOD PRESSURE: 133 MMHG | WEIGHT: 226.5 LBS | RESPIRATION RATE: 16 BRPM | HEIGHT: 63 IN | HEART RATE: 76 BPM | TEMPERATURE: 98.5 F

## 2021-05-15 VITALS
BODY MASS INDEX: 39.6 KG/M2 | DIASTOLIC BLOOD PRESSURE: 62 MMHG | HEART RATE: 82 BPM | SYSTOLIC BLOOD PRESSURE: 137 MMHG | HEIGHT: 63 IN | WEIGHT: 223.5 LBS | OXYGEN SATURATION: 95 %

## 2021-05-15 VITALS
HEART RATE: 78 BPM | SYSTOLIC BLOOD PRESSURE: 146 MMHG | HEIGHT: 63 IN | WEIGHT: 222.25 LBS | OXYGEN SATURATION: 97 % | BODY MASS INDEX: 39.38 KG/M2 | DIASTOLIC BLOOD PRESSURE: 48 MMHG

## 2021-05-15 VITALS
HEIGHT: 63 IN | SYSTOLIC BLOOD PRESSURE: 136 MMHG | OXYGEN SATURATION: 95 % | WEIGHT: 225.37 LBS | BODY MASS INDEX: 39.93 KG/M2 | HEART RATE: 78 BPM | DIASTOLIC BLOOD PRESSURE: 54 MMHG

## 2021-05-16 VITALS
HEIGHT: 63 IN | DIASTOLIC BLOOD PRESSURE: 63 MMHG | SYSTOLIC BLOOD PRESSURE: 133 MMHG | BODY MASS INDEX: 40.04 KG/M2 | HEART RATE: 73 BPM | WEIGHT: 226 LBS

## 2021-05-16 VITALS
TEMPERATURE: 98.6 F | DIASTOLIC BLOOD PRESSURE: 65 MMHG | HEART RATE: 83 BPM | BODY MASS INDEX: 40.66 KG/M2 | OXYGEN SATURATION: 95 % | RESPIRATION RATE: 16 BRPM | WEIGHT: 229.5 LBS | SYSTOLIC BLOOD PRESSURE: 151 MMHG | HEIGHT: 63 IN

## 2021-05-16 VITALS — HEART RATE: 85 BPM | BODY MASS INDEX: 39.51 KG/M2 | WEIGHT: 223 LBS | HEIGHT: 63 IN | OXYGEN SATURATION: 94 %

## 2021-05-16 VITALS
BODY MASS INDEX: 39.69 KG/M2 | SYSTOLIC BLOOD PRESSURE: 128 MMHG | HEART RATE: 77 BPM | WEIGHT: 224 LBS | HEIGHT: 63 IN | DIASTOLIC BLOOD PRESSURE: 57 MMHG

## 2021-05-16 VITALS
HEART RATE: 70 BPM | HEIGHT: 63 IN | BODY MASS INDEX: 40.4 KG/M2 | SYSTOLIC BLOOD PRESSURE: 137 MMHG | DIASTOLIC BLOOD PRESSURE: 58 MMHG | WEIGHT: 228 LBS

## 2021-06-17 ENCOUNTER — TELEPHONE (OUTPATIENT)
Dept: FAMILY MEDICINE CLINIC | Facility: CLINIC | Age: 76
End: 2021-06-17

## 2021-06-17 NOTE — TELEPHONE ENCOUNTER
----- Message from BRISSA Melvin sent at 6/17/2021  1:57 PM EDT -----  States scanned mammo but it is a bone density-dexa

## 2021-06-24 RX ORDER — LEVOTHYROXINE SODIUM 112 UG/1
TABLET ORAL
COMMUNITY
Start: 2021-03-02 | End: 2022-01-24 | Stop reason: SDUPTHER

## 2021-06-24 RX ORDER — BIMATOPROST 0.01 %
1 DROPS OPHTHALMIC (EYE)
COMMUNITY
End: 2023-03-27

## 2021-06-24 RX ORDER — ASPIRIN 81 MG/1
TABLET ORAL
COMMUNITY

## 2021-06-24 RX ORDER — EZETIMIBE 10 MG/1
TABLET ORAL
COMMUNITY
End: 2021-09-16

## 2021-06-24 RX ORDER — PAROXETINE HYDROCHLORIDE 20 MG/1
TABLET, FILM COATED ORAL
COMMUNITY
Start: 2021-03-02 | End: 2022-01-24 | Stop reason: SDUPTHER

## 2021-06-28 ENCOUNTER — PROCEDURE VISIT (OUTPATIENT)
Dept: OTOLARYNGOLOGY | Facility: CLINIC | Age: 76
End: 2021-06-28

## 2021-06-28 ENCOUNTER — OFFICE VISIT (OUTPATIENT)
Dept: OTOLARYNGOLOGY | Facility: CLINIC | Age: 76
End: 2021-06-28

## 2021-06-28 VITALS — HEIGHT: 63 IN | BODY MASS INDEX: 39.87 KG/M2 | WEIGHT: 225 LBS | TEMPERATURE: 97.8 F

## 2021-06-28 DIAGNOSIS — H90.3 SENSORY HEARING LOSS, BILATERAL: ICD-10-CM

## 2021-06-28 DIAGNOSIS — H90.3 SENSORINEURAL HEARING LOSS (SNHL) OF BOTH EARS: Primary | ICD-10-CM

## 2021-06-28 PROCEDURE — 99213 OFFICE O/P EST LOW 20 MIN: CPT | Performed by: OTOLARYNGOLOGY

## 2021-06-28 PROCEDURE — 92557 COMPREHENSIVE HEARING TEST: CPT | Performed by: AUDIOLOGIST

## 2021-06-28 PROCEDURE — 92567 TYMPANOMETRY: CPT | Performed by: AUDIOLOGIST

## 2021-06-28 RX ORDER — DAPAGLIFLOZIN 10 MG/1
TABLET, FILM COATED ORAL
COMMUNITY
Start: 2021-03-21 | End: 2022-02-28 | Stop reason: SDUPTHER

## 2021-06-28 RX ORDER — BISOPROLOL FUMARATE 5 MG/1
2.5 TABLET, FILM COATED ORAL DAILY
COMMUNITY
End: 2022-04-08 | Stop reason: SDUPTHER

## 2021-06-28 RX ORDER — FLUTICASONE PROPIONATE 50 MCG
2 SPRAY, SUSPENSION (ML) NASAL DAILY
COMMUNITY

## 2021-06-28 RX ORDER — INSULIN GLARGINE 300 U/ML
INJECTION, SOLUTION SUBCUTANEOUS
COMMUNITY
Start: 2021-05-08 | End: 2022-02-09 | Stop reason: SDUPTHER

## 2021-06-28 RX ORDER — IPRATROPIUM BROMIDE 42 UG/1
2 SPRAY, METERED NASAL 4 TIMES DAILY
COMMUNITY
End: 2022-05-18

## 2021-06-28 RX ORDER — ATORVASTATIN CALCIUM 40 MG/1
40 TABLET, FILM COATED ORAL DAILY
COMMUNITY
End: 2022-05-18 | Stop reason: ALTCHOICE

## 2021-06-28 NOTE — PROGRESS NOTES
Patient Name: Belem Acosta   Visit Date: 06/28/2021   Patient ID: [id number]  Provider: Cipriano Babin MD    Sex: female  Location: [facility]   YOB: 1945  Location Address: [address] [place] [address], [address],?[place]?[id number]    Primary Care Provider Joaquin Ambrose  Location Phone: [phone]    Referring Provider: No ref. provider found        Chief Complaint  No chief complaint on file.    History of Present Illness  Belem Acosta is a 75 y.o. female who presents to [facility] today for follow-up of unilateral deafness.  She was originally seen on 11/12/2018 at which time she reported being deaf in her left ear her entire life.  Audiogram on 11/12/2018 revealed right mild to moderate sensorineural hearing loss and no response on the left.  Tympanograms are type A.  She was last seen on 11/11/2019 at which time the hearing in the right ear was relatively stable we discussed right-sided amplification versus a BiCROS.    She returns today for follow-up.  She tells me that she has done well since her last visit.  She feels like her hearing in her right ear has been stable.  She has not had any issues with otalgia, otorrhea, tinnitus, or vertigo.  Audiogram on 6/28/2021 revealed stable right normal downsloping to mild to moderate sensorineural hearing loss and left profound loss.  SRT is 25 on the right and speech combination is 100% at 65 dB.  Tympanograms are type A.    Past Medical History:   Diagnosis Date   • Abnormal bone density screening 03/2019    Normal   • Ankle pain    • Anxiety    • Arthritis    • Bunion    • Corns and callus    • Diabetes mellitus, type 2 (CMS/HCC)    • Hammertoe    • Hyperglycemia    • Hyperlipidemia    • Hypertension    • Hypothyroidism    • Impaired fasting glucose 08/19/2014   • Panic disorder    • Pap smear for cervical cancer screening 2019    NL PER PT SEES DR. PALOMINO IN [place]   •  Screening mammogram, encounter for 05/2020    NORMAL DR. PALOMINO IN [place]   • Sensorineural hearing loss (SNHL) of both ears    • Sleep apnea 08/26/2015       Past Surgical History:   Procedure Laterality Date   • CARPAL TUNNEL INJECTION  2002 2004   • CATARACT EXTRACTION WITH INTRAOCULAR LENS IMPLANT  2012    RIGHT EYE   • CHOLECYSTECTOMY  1982   • COLONOSCOPY  02/2017    POLYPS, TUBULAR ADENOMA   • ROTATOR CUFF REPAIR  06/2013    Left   • THYROIDECTOMY, PARTIAL  1997    Rt thyroid d/t growth-bengin   • WRIST SURGERY  2008         Current Outpatient Medications:   •  aspirin (aspirin) 81 MG EC tablet, Aspirin Low Dose 81 mg oral tablet,delayed release (DR/EC) take 1 tablet (81 mg) by oral route once daily   Active, Disp: , Rfl:   •  atorvastatin (LIPITOR) 40 MG tablet, Take 40 mg by mouth Daily., Disp: , Rfl:   •  bimatoprost (Lumigan) 0.01 % ophthalmic drops, 1 drop., Disp: , Rfl:   •  bisoprolol (ZEBeta) 5 MG tablet, Take 2.5 mg by mouth Daily., Disp: , Rfl:   •  Farxiga 10 MG tablet, , Disp: , Rfl:   •  fluticasone (FLONASE) 50 MCG/ACT nasal spray, 2 sprays into the nostril(s) as directed by provider Daily., Disp: , Rfl:   •  ipratropium (ATROVENT) 0.06 % nasal spray, 2 sprays into the nostril(s) as directed by provider 4 (Four) Times a Day., Disp: , Rfl:   •  levothyroxine (SYNTHROID, LEVOTHROID) 112 MCG tablet, levothyroxine 112 mcg oral tablet TAKE 1 TABLET DAILY IN THE MORNING 3/2/2021  Active, Disp: , Rfl:   •  PARoxetine (PAXIL) 20 MG tablet, paroxetine HCl 20 mg oral tablet TAKE 1 TABLET DAILY AT BEDTIME for 90 days 3/2/2021  Active, Disp: , Rfl:   •  Toujeo Max SoloStar 300 UNIT/ML solution pen-injector injection, , Disp: , Rfl:   •  ezetimibe (Zetia) 10 MG tablet, Zetia 10 mg oral tablet take 1 tablet (10 mg) by oral route once daily   Suspended, Disp: , Rfl:      Allergies   Allergen Reactions   • Metformin Nausea And Vomiting and Unknown - Low Severity       Social History     Tobacco Use   •  "Smoking status: Never Smoker   Substance Use Topics   • Alcohol use: Never   • Drug use: Not on file        Objective     Vital Signs:   Temp 97.8 °F (36.6 °C) (Temporal)   Ht 160 cm (63\")   Wt 102 kg (225 lb)   BMI 39.86 kg/m²       Physical Exam    General: Well developed, well nourished patient of stated age in no acute distress. Voice is strong and clear.   Head: Normocephalic and atraumatic.  Face: No lesions.  Bilateral parotid and submandibular glands are unremarkable.  Stensen's and Warthin's ducts are productive of clear saliva bilaterally.  House-Brackmann I/VI     bilaterally.   muscles and temporomandibular joint nontender to palpation.  No TMJ crepitus.  Eyes: PERRLA, sclerae anicteric, no conjunctival injection. Extra ocular movements are intact and full. No nystagmus.   Ears: Auricles are normal in appearance. Bilateral external auditory canals are unremarkable. Bilateral tympanic membranes are clear and without effusion. Hearing normal to conversational voice.   Nose: External nose is normal in appearance. Bilateral nares are patent with normal appearing mucosa. Septum midline. Turbinates are unremarkable. No lesions.   Oral Cavity: Lips are normal in appearance. Oral mucosa is unremarkable. Gingiva is unremarkable. Partial dentition for age. Tongue is unremarkable with good movement. Hard palate is unremarkable.   Oropharynx: Soft palate is unremarkable with full movement. Uvula is unremarkable. Bilateral tonsils are unremarkable. Posterior oropharynx is unremarkable.    Larynx and hypopharynx: Deferred secondary to gag reflex.  Neck: Supple.  No mass.  Nontender to palpation.  Trachea midline. Thyroid normal size and without nodules to palpation.   Lymphatic: No lymphadenopathy upon palpation.  Respiratory: Clear to auscultation bilaterally, nonlabored respirations    Cardiovascular: RRR, no murmurs, rubs, or gallops,   Psychiatric: Appropriate affect, cooperative   Neurologic: " Oriented x 3, strength symmetric in all extremities, Cranial Nerves II-XII are grossly intact to confrontation   Skin: Warm and dry. No rashes.    Procedures     Result Review :               Assessment and Plan    Diagnoses and all orders for this visit:    1. Sensorineural hearing loss (SNHL) of both ears (Primary)  -     Audiometry With Tympanometry; Future    Impressions and findings were discussed with  at great length.  Fortunately, the hearing in her right ear is stable to slightly improved from 2019 and she is without complaint.  She will follow up in 2 years with a repeat audiogram or sooner if she notices any changes.      Follow Up   No follow-ups on file.  Patient was given instructions and counseling regarding her condition or for health maintenance advice. Please see specific information pulled into the AVS if appropriate.

## 2021-09-16 ENCOUNTER — OFFICE VISIT (OUTPATIENT)
Dept: FAMILY MEDICINE CLINIC | Facility: CLINIC | Age: 76
End: 2021-09-16

## 2021-09-16 VITALS
SYSTOLIC BLOOD PRESSURE: 122 MMHG | HEART RATE: 75 BPM | DIASTOLIC BLOOD PRESSURE: 68 MMHG | BODY MASS INDEX: 39.51 KG/M2 | WEIGHT: 223 LBS | HEIGHT: 63 IN | OXYGEN SATURATION: 96 %

## 2021-09-16 DIAGNOSIS — E11.9 TYPE 2 DIABETES MELLITUS WITHOUT COMPLICATION, WITHOUT LONG-TERM CURRENT USE OF INSULIN (HCC): ICD-10-CM

## 2021-09-16 DIAGNOSIS — Z00.00 MEDICARE ANNUAL WELLNESS VISIT, SUBSEQUENT: Primary | ICD-10-CM

## 2021-09-16 DIAGNOSIS — Z91.81 AT LOW RISK FOR FALL: ICD-10-CM

## 2021-09-16 DIAGNOSIS — I10 ESSENTIAL HYPERTENSION: ICD-10-CM

## 2021-09-16 PROBLEM — E78.5 HYPERLIPIDEMIA: Status: ACTIVE | Noted: 2021-09-16

## 2021-09-16 PROBLEM — M19.90 ARTHRITIS: Status: ACTIVE | Noted: 2021-09-16

## 2021-09-16 PROBLEM — H90.3 SENSORINEURAL HEARING LOSS (SNHL) OF BOTH EARS: Status: ACTIVE | Noted: 2021-09-16

## 2021-09-16 PROBLEM — M20.40 HAMMERTOE: Status: ACTIVE | Noted: 2021-09-16

## 2021-09-16 PROBLEM — M21.619 BUNION: Status: ACTIVE | Noted: 2021-09-16

## 2021-09-16 PROBLEM — E03.9 HYPOTHYROIDISM: Status: ACTIVE | Noted: 2021-09-16

## 2021-09-16 PROCEDURE — G0439 PPPS, SUBSEQ VISIT: HCPCS | Performed by: NURSE PRACTITIONER

## 2021-09-16 PROCEDURE — 1170F FXNL STATUS ASSESSED: CPT | Performed by: NURSE PRACTITIONER

## 2021-09-16 PROCEDURE — 1126F AMNT PAIN NOTED NONE PRSNT: CPT | Performed by: NURSE PRACTITIONER

## 2021-09-16 PROCEDURE — 1159F MED LIST DOCD IN RCRD: CPT | Performed by: NURSE PRACTITIONER

## 2021-09-16 PROCEDURE — 99213 OFFICE O/P EST LOW 20 MIN: CPT | Performed by: NURSE PRACTITIONER

## 2021-09-16 RX ORDER — SIMVASTATIN 40 MG
TABLET ORAL
COMMUNITY
Start: 2021-06-29 | End: 2022-05-18

## 2021-09-16 NOTE — PROGRESS NOTES
The ABCs of the Annual Wellness Visit  Subsequent Medicare Wellness Visit    Chief Complaint   Patient presents with   • Hyperlipidemia     6 month f/u   • Medicare Wellness-subsequent      Subjective    History of Present Illness:  Belem Acosta is a 76 y.o. female who presents for a Subsequent Medicare Wellness Visit.    Pt has no concerns at this time.    Pt would like to get the COVID Booster when it comes available.    No medication refills at this time.     DEXA and Mammogram 6/14/2021, WNL    PAP 4/26/2021, WNL    DM Eye Exam w/ Dr. Youngblood 7/13/2021, WNL    DM-blood sugar running 137-150. Last A1C 7.4%    She met a man in her apartment complex and is having a good time.     The following portions of the patient's history were reviewed and   updated as appropriate: allergies, current medications, past family history, past medical history, past social history, past surgical history and problem list.    Compared to one year ago, the patient feels her physical   health is the same.    Compared to one year ago, the patient feels her mental   health is the same.    Recent Hospitalizations:  She was not admitted to the hospital during the last year.       Current Medical Providers:  Patient Care Team:  Elena Nuñez APRN as PCP - General (Nurse Practitioner)    Outpatient Medications Prior to Visit   Medication Sig Dispense Refill   • aspirin (aspirin) 81 MG EC tablet Aspirin Low Dose 81 mg oral tablet,delayed release (DR/EC) take 1 tablet (81 mg) by oral route once daily   Active     • atorvastatin (LIPITOR) 40 MG tablet Take 40 mg by mouth Daily.     • bimatoprost (Lumigan) 0.01 % ophthalmic drops 1 drop.     • bisoprolol (ZEBeta) 5 MG tablet Take 2.5 mg by mouth Daily.     • Farxiga 10 MG tablet      • fluticasone (FLONASE) 50 MCG/ACT nasal spray 2 sprays into the nostril(s) as directed by provider Daily.     • ipratropium (ATROVENT) 0.06 % nasal spray 2 sprays into the nostril(s) as directed by  "provider 4 (Four) Times a Day.     • levothyroxine (SYNTHROID, LEVOTHROID) 112 MCG tablet levothyroxine 112 mcg oral tablet TAKE 1 TABLET DAILY IN THE MORNING 3/2/2021  Active     • PARoxetine (PAXIL) 20 MG tablet paroxetine HCl 20 mg oral tablet TAKE 1 TABLET DAILY AT BEDTIME for 90 days 3/2/2021  Active     • simvastatin (ZOCOR) 40 MG tablet      • Toujeo Max SoloStar 300 UNIT/ML solution pen-injector injection      • ezetimibe (Zetia) 10 MG tablet Zetia 10 mg oral tablet take 1 tablet (10 mg) by oral route once daily   Suspended       No facility-administered medications prior to visit.       No opioid medication identified on active medication list. I have reviewed chart for other potential  high risk medication/s and harmful drug interactions in the elderly.          Aspirin is on active medication list. Aspirin use is indicated based on review of current medical condition/s. Pros and cons of this therapy have been discussed today. Benefits of this medication outweigh potential harm.  Patient has been encouraged to continue taking this medication.  .      Patient Active Problem List   Diagnosis   • Arthritis   • Diabetes mellitus, type II (HCC)   • Bunion   • Hammertoe   • Hyperlipidemia   • Hypertension   • Hypothyroidism   • Sensorineural hearing loss (SNHL) of both ears   • Sleep apnea     Advance Care Planning  Advance Directive is not on file.  ACP discussion was held with the patient during this visit. Patient does not have an advance directive, declines further assistance.          Objective    Vitals:    09/16/21 1513   BP: 122/68   Pulse: 75   SpO2: 96%   Weight: 101 kg (223 lb)   Height: 160 cm (63\")   PainSc: 0-No pain     BMI Readings from Last 1 Encounters:   09/16/21 39.50 kg/m²   BMI is above normal parameters. Recommendations include: exercise counseling    Does the patient have evidence of cognitive impairment? No    Physical Exam  Constitutional:       Appearance: Normal appearance.   Neck:    "   Thyroid: No thyroid mass, thyromegaly or thyroid tenderness.   Cardiovascular:      Rate and Rhythm: Normal rate and regular rhythm.      Pulses: Normal pulses.      Heart sounds: Normal heart sounds.   Pulmonary:      Effort: Pulmonary effort is normal.   Musculoskeletal:      Right lower leg: No edema.      Left lower leg: No edema.   Skin:     General: Skin is warm and dry.   Neurological:      General: No focal deficit present.      Mental Status: She is alert and oriented to person, place, and time.   Psychiatric:         Mood and Affect: Mood normal.         Behavior: Behavior normal.                 HEALTH RISK ASSESSMENT    Smoking Status:  Social History     Tobacco Use   Smoking Status Never Smoker   Smokeless Tobacco Never Used     Alcohol Consumption:  Social History     Substance and Sexual Activity   Alcohol Use Yes    Comment: Rarely     Fall Risk Screen:    [facility] Fall Risk Assessment was completed, and patient is at LOW risk for falls.Assessment completed on:9/16/2021    Depression Screening:  PHQ-2/PHQ-9 Depression Screening 9/16/2021   Little interest or pleasure in doing things 0   Feeling down, depressed, or hopeless 0   Total Score 0       Health Habits and Functional and Cognitive Screening:  Functional & Cognitive Status 9/16/2021   Do you have difficulty preparing food and eating? No   Do you have difficulty bathing yourself, getting dressed or grooming yourself? No   Do you have difficulty using the toilet? No   Do you have difficulty moving around from place to place? No   Do you have trouble with steps or getting out of a bed or a chair? No   Current Diet Low Carb Diet   Dental Exam Up to date   Eye Exam Up to date   Exercise (times per week) 7 times per week   Current Exercises Include House Cleaning;Swimming;Walking   Do you need help using the phone?  No   Are you deaf or do you have serious difficulty hearing?  Yes   Do you need help with transportation? No   Do you need help  shopping? No   Do you need help preparing meals?  No   Do you need help with housework?  No   Do you need help with laundry? No   Do you need help taking your medications? No   Do you need help managing money? No   Do you ever drive or ride in a car without wearing a seat belt? No   Have you felt unusual stress, anger or loneliness in the last month? No   Who do you live with? Alone   If you need help, do you have trouble finding someone available to you? No   Have you been bothered in the last four weeks by sexual problems? No   Do you have difficulty concentrating, remembering or making decisions? No       Age-appropriate Screening Schedule:  Refer to the list below for future screening recommendations based on patient's age, sex and/or medical conditions. Orders for these recommended tests are listed in the plan section. The patient has been provided with a written plan.    Health Maintenance   Topic Date Due   • ZOSTER VACCINE (1 of 2) Never done   • DIABETIC FOOT EXAM  06/17/2021   • DIABETIC EYE EXAM  06/17/2021   • LIPID PANEL  06/24/2021   • HEMOGLOBIN A1C  10/01/2021   • INFLUENZA VACCINE  10/01/2021   • URINE MICROALBUMIN  04/01/2022   • MAMMOGRAM  06/14/2023   • DXA SCAN  06/14/2023   • TDAP/TD VACCINES (3 - Td or Tdap) 01/01/2026                Assessment/Plan   CMS Preventative Services Quick Reference  Risk Factors Identified During Encounter  Obesity/Overweight   The above risks/problems have been discussed with the patient.  Follow up actions/plans if indicated are seen below in the Assessment/Plan Section.  Pertinent information has been shared with the patient in the After Visit Summary.    Diagnoses and all orders for this visit:    1. Medicare annual wellness visit, subsequent (Primary)    2. At low risk for fall    3. Essential hypertension  Comments:  b/p well controlled on Zebeta  Orders:  -     Comprehensive metabolic panel; Future    4. Type 2 diabetes mellitus without complication, without  long-term current use of insulin (CMS/[facility])  Comments:  DM Eye Exam w/ Dr. Youngblood 7/13/2021, WNL    DM-blood sugar running 137-150. Last A1C 7.4%  Orders:  -     Hemoglobin A1c; Future        Follow Up:   Return in about 6 months (around 3/16/2022).     An After Visit Summary and PPPS were made available to the patient.        I spent 25 minutes caring for Belem on this date of service. This time includes time spent by me in the following activities:reviewing tests, obtaining and/or reviewing a separately obtained history, performing a medically appropriate examination and/or evaluation , counseling and educating the patient/family/caregiver, ordering medications, tests, or procedures and documenting information in the medical record

## 2021-09-16 NOTE — PATIENT INSTRUCTIONS
Advance Care Planning and Advance Directives     You make decisions on a daily basis - decisions about where you want to live, your career, your home, your life. Perhaps one of the most important decisions you face is your choice for future medical care. Take time to talk with your family and your healthcare team and start planning today.  Advance Care Planning is a process that can help you:  · Understand possible future healthcare decisions in light of your own experiences  · Reflect on those decision in light of your goals and values  · Discuss your decisions with those closest to you and the healthcare professionals that care for you  · Make a plan by creating a document that reflects your wishes    Surrogate Decision Maker  In the event of a medical emergency, which has left you unable to communicate or to make your own decisions, you would need someone to make decisions for you.  It is important to discuss your preferences for medical treatment with this person while you are in good health.     Qualities of a surrogate decision maker:  • Willing to take on this role and responsibility  • Knows what you want for future medical care  • Willing to follow your wishes even if they don't agree with them  • Able to make difficult medical decisions under stressful circumstances    Advance Directives  These are legal documents you can create that will guide your healthcare team and decision maker(s) when needed. These documents can be stored in the electronic medical record.    · Living Will - a legal document to guide your care if you have a terminal condition or a serious illness and are unable to communicate. States vary by statute in document names/types, but most forms may include one or more of the following:        -  Directions regarding life-prolonging treatments        -  Directions regarding artificially provided nutrition/hydration        -  Choosing a healthcare decision maker        -  Direction  regarding organ/tissue donation    · Durable Power of  for Healthcare - this document names an -in-fact to make medical decisions for you, but it may also allow this person to make personal and financial decisions for you. Please seek the advice of an  if you need this type of document.    **Advance Directives are not required and no one may discriminate against you if you do not sign one.    Medical Orders  Many states allow specific forms/orders signed by your physician to record your wishes for medical treatment in your current state of health. This form, signed in personal communication with your physician, addresses resuscitation and other medical interventions that you may or may not want.      For more information or to schedule a time with a [facility] Advance Care Planning Facilitator contact: [facility] or call [phone] and someone will contact you directly.    Medicare Wellness  Personal Prevention Plan of Service     Date of Office Visit:    Encounter Provider:  BRISSA Melvin  Place of Service:  [facility]  Patient Name: Belem Acosta  :  1945    As part of the Medicare Wellness portion of your visit today, we are providing you with this personalized preventive plan of services (PPPS). This plan is based upon recommendations of the [place] Preventive Services Task Force (USPSTF) and the Advisory Committee on Immunization Practices (ACIP).    This lists the preventive care services that should be considered, and provides dates of when you are due. Items listed as completed are up-to-date and do not require any further intervention.    Health Maintenance   Topic Date Due   • ZOSTER VACCINE (1 of 2) Never done   • ANNUAL WELLNESS VISIT  Never done   • LIPID PANEL  2021   • HEPATITIS C SCREENING  2022 (Originally 2021)   • INFLUENZA VACCINE  10/01/2021   • MAMMOGRAM  2023   • DXA  SCAN  06/14/2023   • TDAP/TD VACCINES (3 - Td or Tdap) 01/01/2026   • COLORECTAL CANCER SCREENING  02/23/2027   • COVID-19 Vaccine  Completed   • Pneumococcal Vaccine 65+  Completed       No orders of the defined types were placed in this encounter.      No follow-ups on file.          Fall Prevention in the Home, Adult  Falls can cause injuries and can affect people from all age groups. There are many simple things that you can do to make your home safe and to help prevent falls. Ask for help when making these changes, if needed.  What actions can I take to prevent falls?  General instructions  · Use good lighting in all rooms. Replace any light bulbs that burn out.  · Turn on lights if it is dark. Use night-lights.  · Place frequently used items in easy-to-reach places. Lower the shelves around your home if necessary.  · Set up furniture so that there are clear paths around it. Avoid moving your furniture around.  · Remove throw rugs and other tripping hazards from the floor.  · Avoid walking on wet floors.  · Fix any uneven floor surfaces.  · Add color or contrast paint or tape to grab bars and handrails in your home. Place contrasting color strips on the first and last steps of stairways.  · When you use a stepladder, make sure that it is completely opened and that the sides are firmly locked. Have someone hold the ladder while you are using it. Do not climb a closed stepladder.  · Be aware of any and all pets.  What can I do in the bathroom?         · Keep the floor dry. Immediately clean up any water that spills onto the floor.  · Remove soap buildup in the tub or shower on a regular basis.  · Use non-skid mats or decals on the floor of the tub or shower.  · Attach bath mats securely with double-sided, non-slip rug tape.  · If you need to sit down while you are in the shower, use a plastic, non-slip stool.  · Install grab bars by the toilet and in the tub and shower. Do not use towel bars as grab bars.  What  can I do in the bedroom?  · Make sure that a bedside light is easy to reach.  · Do not use oversized bedding that drapes onto the floor.  · Have a firm chair that has side arms to use for getting dressed.  What can I do in the kitchen?  · Clean up any spills right away.  · If you need to reach for something above you, use a sturdy step stool that has a grab bar.  · Keep electrical cables out of the way.  · Do not use floor polish or wax that makes floors slippery. If you must use wax, make sure that it is non-skid floor wax.  What can I do in the stairways?  · Do not leave any items on the stairs.  · Make sure that you have a light switch at the top of the stairs and the bottom of the stairs. Have them installed if you do not have them.  · Make sure that there are handrails on both sides of the stairs. Fix handrails that are broken or loose. Make sure that handrails are as long as the stairways.  · Install non-slip stair treads on all stairs in your home.  · Avoid having throw rugs at the top or bottom of stairways, or secure the rugs with carpet tape to prevent them from moving.  · Choose a carpet design that does not hide the edge of steps on the stairway.  · Check any carpeting to make sure that it is firmly attached to the stairs. Fix any carpet that is loose or worn.  What can I do on the outside of my home?  · Use bright outdoor lighting.  · Regularly repair the edges of walkways and driveways and fix any cracks.  · Remove high doorway thresholds.  · Trim any shrubbery on the main path into your home.  · Regularly check that handrails are securely fastened and in good repair. Both sides of any steps should have handrails.  · Install guardrails along the edges of any raised decks or porches.  · Clear walkways of debris and clutter, including tools and rocks.  · Have leaves, snow, and ice cleared regularly.  · Use sand or salt on walkways during winter months.  · In the garage, clean up any spills right away,  including grease or oil spills.  What other actions can I take?  · Wear closed-toe shoes that fit well and support your feet. Wear shoes that have rubber soles or low heels.  · Use mobility aids as needed, such as canes, walkers, scooters, and crutches.  · Review your medicines with your health care provider. Some medicines can cause dizziness or changes in blood pressure, which increase your risk of falling.  Talk with your health care provider about other ways that you can decrease your risk of falls. This may include working with a physical therapist or  to improve your strength, balance, and endurance.  Where to find more information  · Centers for Disease Control and Prevention, [place]: https://www.cdc.gov  · National [place] on Aging: https://oz8dunu.lyndsey.nih.gov  Contact a health care provider if:  · You are afraid of falling at home.  · You feel weak, drowsy, or dizzy at home.  · You fall at home.  Summary  · There are many simple things that you can do to make your home safe and to help prevent falls.  · Ways to make your home safe include removing tripping hazards and installing grab bars in the bathroom.  · Ask for help when making these changes in your home.  This information is not intended to replace advice given to you by your health care provider. Make sure you discuss any questions you have with your health care provider.  Document Revised: 11/30/2018 Document Reviewed: 08/02/2018  [employer] Patient Education © 2021 [employer]      Exercising to Stay Healthy  To become healthy and stay healthy, it is recommended that you do moderate-intensity and vigorous-intensity exercise. You can tell that you are exercising at a moderate intensity if your heart starts beating faster and you start breathing faster but can still hold a conversation. You can tell that you are exercising at a vigorous intensity if you are breathing much harder and faster and cannot hold a conversation while  exercising.  Exercising regularly is important. It has many health benefits, such as:  · Improving overall fitness, flexibility, and endurance.  · Increasing bone density.  · Helping with weight control.  · Decreasing body fat.  · Increasing muscle strength.  · Reducing stress and tension.  · Improving overall health.  How often should I exercise?  Choose an activity that you enjoy, and set realistic goals. Your health care provider can help you make an activity plan that works for you.  Exercise regularly as told by your health care provider. This may include:  · Doing strength training two times a week, such as:  ? Lifting weights.  ? Using resistance bands.  ? Push-ups.  ? Sit-ups.  ? Yoga.  · Doing a certain intensity of exercise for a given amount of time. Choose from these options:  ? A total of 150 minutes of moderate-intensity exercise every week.  ? A total of 75 minutes of vigorous-intensity exercise every week.  ? A mix of moderate-intensity and vigorous-intensity exercise every week.  Children, pregnant women, people who have not exercised regularly, people who are overweight, and older adults may need to talk with a health care provider about what activities are safe to do. If you have a medical condition, be sure to talk with your health care provider before you start a new exercise program.  What are some exercise ideas?  Moderate-intensity exercise ideas include:  · Walking 1 mile (1.6 km) in about 15 minutes.  · Biking.  · Hiking.  · Golfing.  · Dancing.  · Water aerobics.  Vigorous-intensity exercise ideas include:  · Walking 4.5 miles (7.2 km) or more in about 1 hour.  · Jogging or running 5 miles (8 km) in about 1 hour.  · Biking 10 miles (16.1 km) or more in about 1 hour.  · Lap swimming.  · Roller-skating or in-line skating.  · Cross-country skiing.  · Vigorous competitive sports, such as football, basketball, and soccer.  · Jumping rope.  · Aerobic dancing.  What are some everyday activities  that can help me to get exercise?  · Yard work, such as:  ? Pushing a .  ? Raking and bagging leaves.  · Washing your car.  · Pushing a stroller.  · Shoveling snow.  · Gardening.  · Washing windows or floors.  How can I be more active in my day-to-day activities?  · Use stairs instead of an elevator.  · Take a walk during your lunch break.  · If you drive, park your car farther away from your work or school.  · If you take public transportation, get off one stop early and walk the rest of the way.  · Stand up or walk around during all of your indoor phone calls.  · Get up, stretch, and walk around every 30 minutes throughout the day.  · Enjoy exercise with a friend. Support to continue exercising will help you keep a regular routine of activity.  What guidelines can I follow while exercising?  · Before you start a new exercise program, talk with your health care provider.  · Do not exercise so much that you hurt yourself, feel dizzy, or get very short of breath.  · Wear comfortable clothes and wear shoes with good support.  · Drink plenty of water while you exercise to prevent dehydration or heat stroke.  · Work out until your breathing and your heartbeat get faster.  Where to find more information  · U.S. Department of Health and Human Services: www.hhs.gov  · Centers for Disease Control and Prevention (CDC): www.cdc.gov  Summary  · Exercising regularly is important. It will improve your overall fitness, flexibility, and endurance.  · Regular exercise also will improve your overall health. It can help you control your weight, reduce stress, and improve your bone density.  · Do not exercise so much that you hurt yourself, feel dizzy, or get very short of breath.  · Before you start a new exercise program, talk with your health care provider.  This information is not intended to replace advice given to you by your health care provider. Make sure you discuss any questions you have with your health care  provider.  Document Revised: 11/30/2018 Document Reviewed: 11/08/2018  [employer] Patient Education © 2021 [employer]      Advance Care Planning and Advance Directives     You make decisions on a daily basis - decisions about where you want to live, your career, your home, your life. Perhaps one of the most important decisions you face is your choice for future medical care. Take time to talk with your family and your healthcare team and start planning today.  Advance Care Planning is a process that can help you:  · Understand possible future healthcare decisions in light of your own experiences  · Reflect on those decision in light of your goals and values  · Discuss your decisions with those closest to you and the healthcare professionals that care for you  · Make a plan by creating a document that reflects your wishes    Surrogate Decision Maker  In the event of a medical emergency, which has left you unable to communicate or to make your own decisions, you would need someone to make decisions for you.  It is important to discuss your preferences for medical treatment with this person while you are in good health.     Qualities of a surrogate decision maker:  • Willing to take on this role and responsibility  • Knows what you want for future medical care  • Willing to follow your wishes even if they don't agree with them  • Able to make difficult medical decisions under stressful circumstances    Advance Directives  These are legal documents you can create that will guide your healthcare team and decision maker(s) when needed. These documents can be stored in the electronic medical record.    · Living Will - a legal document to guide your care if you have a terminal condition or a serious illness and are unable to communicate. States vary by statute in document names/types, but most forms may include one or more of the following:        -  Directions regarding life-prolonging treatments        -  Directions  regarding artificially provided nutrition/hydration        -  Choosing a healthcare decision maker        -  Direction regarding organ/tissue donation    · Durable Power of  for Healthcare - this document names an -in-fact to make medical decisions for you, but it may also allow this person to make personal and financial decisions for you. Please seek the advice of an  if you need this type of document.    **Advance Directives are not required and no one may discriminate against you if you do not sign one.    Medical Orders  Many states allow specific forms/orders signed by your physician to record your wishes for medical treatment in your current state of health. This form, signed in personal communication with your physician, addresses resuscitation and other medical interventions that you may or may not want.      For more information or to schedule a time with a [facility] Advance Care Planning Facilitator contact: [facility] or call [phone] and someone will contact you directly.    Medicare Wellness  Personal Prevention Plan of Service     Date of Office Visit:    Encounter Provider:  BRISSA Melvin  Place of Service:  [facility]  Patient Name: Belem Acosta  :  1945    As part of the Medicare Wellness portion of your visit today, we are providing you with this personalized preventive plan of services (PPPS). This plan is based upon recommendations of the [place] Preventive Services Task Force (USPSTF) and the Advisory Committee on Immunization Practices (ACIP).    This lists the preventive care services that should be considered, and provides dates of when you are due. Items listed as completed are up-to-date and do not require any further intervention.    Health Maintenance   Topic Date Due   • ZOSTER VACCINE (1 of 2) Never done   • ANNUAL WELLNESS VISIT  Never done   • LIPID PANEL  2021   •  HEPATITIS C SCREENING  09/16/2022 (Originally 6/17/2021)   • INFLUENZA VACCINE  10/01/2021   • MAMMOGRAM  06/14/2023   • DXA SCAN  06/14/2023   • TDAP/TD VACCINES (3 - Td or Tdap) 01/01/2026   • COLORECTAL CANCER SCREENING  02/23/2027   • COVID-19 Vaccine  Completed   • Pneumococcal Vaccine 65+  Completed       No orders of the defined types were placed in this encounter.      No follow-ups on file.

## 2021-09-16 NOTE — PROGRESS NOTES
"Chief Complaint  Hyperlipidemia (6 month f/u) and Medicare Wellness-subsequent    Subjective     {Problem List  Visit Diagnosis   Encounters  Notes  Medications  Labs  Result Review Imaging  Media :23}     Belem Acosta presents to [facility]  Pt has no concerns at this time.    Pt would like to get the COVID Booster when it comes available.    No medication refills at this time.     DEXA and Mammogram 6/14/2021, WNL    PAP 4/26/2021, WNL    DM Eye Exam w/ Dr. Youngblood 7/13/2021, WNL    DM-blood sugar running 137-150. Last A1C 7.4%    She met a man in her apartment complex and is having a good time.       She  has a past medical history of Abnormal bone density screening (03/2019), Ankle pain, Anxiety, Arthritis, Bunion, Corns and callus, Diabetes mellitus, type 2 (CMS/HCC), Hammertoe, Hyperglycemia, Hyperlipidemia, Hypertension, Hypothyroidism, Impaired fasting glucose (08/19/2014), Panic disorder, Pap smear for cervical cancer screening (2019), Screening mammogram, encounter for (05/2020), Sensorineural hearing loss (SNHL) of both ears, and Sleep apnea (08/26/2015).     Objective   Vital Signs:   /68   Pulse 75   Ht 160 cm (63\")   Wt 101 kg (223 lb)   SpO2 96%   BMI 39.50 kg/m²     Physical Exam   Result Review :{Labs  Result Review  Imaging  Med Tab  Media  Procedures :23}   {The following data was reviewed by (Optional):73720}  {Ambulatory Labs (Optional):28491}  {Data reviewed (Optional):64421:::1}            Current Outpatient Medications:   •  aspirin (aspirin) 81 MG EC tablet, Aspirin Low Dose 81 mg oral tablet,delayed release (DR/EC) take 1 tablet (81 mg) by oral route once daily   Active, Disp: , Rfl:   •  atorvastatin (LIPITOR) 40 MG tablet, Take 40 mg by mouth Daily., Disp: , Rfl:   •  bimatoprost (Lumigan) 0.01 % ophthalmic drops, 1 drop., Disp: , Rfl:   •  bisoprolol (ZEBeta) 5 MG tablet, Take 2.5 mg by mouth Daily., Disp: , Rfl:   •  Farxiga " "10 MG tablet, , Disp: , Rfl:   •  fluticasone (FLONASE) 50 MCG/ACT nasal spray, 2 sprays into the nostril(s) as directed by provider Daily., Disp: , Rfl:   •  ipratropium (ATROVENT) 0.06 % nasal spray, 2 sprays into the nostril(s) as directed by provider 4 (Four) Times a Day., Disp: , Rfl:   •  levothyroxine (SYNTHROID, LEVOTHROID) 112 MCG tablet, levothyroxine 112 mcg oral tablet TAKE 1 TABLET DAILY IN THE MORNING 3/2/2021  Active, Disp: , Rfl:   •  PARoxetine (PAXIL) 20 MG tablet, paroxetine HCl 20 mg oral tablet TAKE 1 TABLET DAILY AT BEDTIME for 90 days 3/2/2021  Active, Disp: , Rfl:   •  simvastatin (ZOCOR) 40 MG tablet, , Disp: , Rfl:   •  Toujeo Max SoloStar 300 UNIT/ML solution pen-injector injection, , Disp: , Rfl:    Assessment and Plan {CC Problem List  Visit Diagnosis   ROS  Review (Popup)  Health Maintenance  Quality  BestPractice  Medications  SmartSets  SnapShot Encounters  Media :23}   Diagnoses and all orders for this visit:    1. Medicare annual wellness visit, subsequent (Primary)    2. At low risk for fall    3. Essential hypertension  -     Comprehensive metabolic panel; Future    4. Type 2 diabetes mellitus without complication, without long-term current use of insulin (CMS/[facility])  -     Hemoglobin A1c; Future      {Time Spent (Optional):83082}  Follow Up {Instructions Charge Capture  Follow-up Communications :23}  No follow-ups on file.  Patient was given instructions and counseling regarding her condition or for health maintenance advice. Please see specific information pulled into the AVS if appropriate.     Belem EISENBERG Dave  reports that she has never smoked. She has never used smokeless tobacco.. I have educated her on the risk of diseases from using tobacco products such as {Tobacco Cessation Diseases:39434::\"cancer\",\"COPD\",\"heart disease\"}.     I advised her to quit and she is {Willing/Not Willing to Quit Tobacco Products:36339}.    I spent {Time Spent Tobacco :66607} " minutes counseling the patient.              Elena Nuñez, APRN

## 2021-10-14 ENCOUNTER — LAB (OUTPATIENT)
Dept: LAB | Facility: HOSPITAL | Age: 76
End: 2021-10-14

## 2021-10-14 DIAGNOSIS — I10 ESSENTIAL HYPERTENSION: ICD-10-CM

## 2021-10-14 DIAGNOSIS — E11.9 TYPE 2 DIABETES MELLITUS WITHOUT COMPLICATION, WITHOUT LONG-TERM CURRENT USE OF INSULIN (HCC): ICD-10-CM

## 2021-10-14 LAB
ALBUMIN SERPL-MCNC: 4.5 G/DL (ref 3.5–5.2)
ALBUMIN/GLOB SERPL: 1.6 G/DL
ALP SERPL-CCNC: 99 U/L (ref 39–117)
ALT SERPL W P-5'-P-CCNC: 26 U/L (ref 1–33)
ANION GAP SERPL CALCULATED.3IONS-SCNC: 13.8 MMOL/L (ref 5–15)
AST SERPL-CCNC: 24 U/L (ref 1–32)
BILIRUB SERPL-MCNC: 0.4 MG/DL (ref 0–1.2)
BUN SERPL-MCNC: 15 MG/DL (ref 8–23)
BUN/CREAT SERPL: 27.3 (ref 7–25)
CALCIUM SPEC-SCNC: 10.2 MG/DL (ref 8.6–10.5)
CHLORIDE SERPL-SCNC: 102 MMOL/L (ref 98–107)
CO2 SERPL-SCNC: 25.2 MMOL/L (ref 22–29)
CREAT SERPL-MCNC: 0.55 MG/DL (ref 0.57–1)
GFR SERPL CREATININE-BSD FRML MDRD: 108 ML/MIN/1.73
GLOBULIN UR ELPH-MCNC: 2.9 GM/DL
GLUCOSE SERPL-MCNC: 158 MG/DL (ref 65–99)
HBA1C MFR BLD: 7.7 % (ref 4.8–5.6)
POTASSIUM SERPL-SCNC: 3.9 MMOL/L (ref 3.5–5.2)
PROT SERPL-MCNC: 7.4 G/DL (ref 6–8.5)
SODIUM SERPL-SCNC: 141 MMOL/L (ref 136–145)

## 2021-10-14 PROCEDURE — 36415 COLL VENOUS BLD VENIPUNCTURE: CPT

## 2021-10-14 PROCEDURE — 83036 HEMOGLOBIN GLYCOSYLATED A1C: CPT

## 2021-10-14 PROCEDURE — 80053 COMPREHEN METABOLIC PANEL: CPT

## 2021-12-15 ENCOUNTER — OFFICE VISIT (OUTPATIENT)
Dept: SLEEP MEDICINE | Facility: HOSPITAL | Age: 76
End: 2021-12-15

## 2021-12-15 VITALS
DIASTOLIC BLOOD PRESSURE: 70 MMHG | SYSTOLIC BLOOD PRESSURE: 161 MMHG | TEMPERATURE: 97.6 F | WEIGHT: 222.2 LBS | HEIGHT: 63 IN | OXYGEN SATURATION: 91 % | HEART RATE: 84 BPM | BODY MASS INDEX: 39.37 KG/M2

## 2021-12-15 DIAGNOSIS — E66.9 CLASS 2 OBESITY: ICD-10-CM

## 2021-12-15 DIAGNOSIS — G47.33 OBSTRUCTIVE SLEEP APNEA SYNDROME: Primary | ICD-10-CM

## 2021-12-15 PROBLEM — E66.812 CLASS 2 OBESITY: Status: ACTIVE | Noted: 2021-12-15

## 2021-12-15 PROCEDURE — G0463 HOSPITAL OUTPT CLINIC VISIT: HCPCS | Performed by: INTERNAL MEDICINE

## 2021-12-15 PROCEDURE — 99213 OFFICE O/P EST LOW 20 MIN: CPT | Performed by: INTERNAL MEDICINE

## 2021-12-15 NOTE — PROGRESS NOTES
"  [address] [address]  Phone: [phone]  Fax: [phone]      SLEEP CLINIC FOLLOW UP PROGRESS NOTE.    Belem Acosta  1945  76 y.o.  female      PCP: lEena Nuñez APRN      Date of visit: 12/15/2021    Chief Complaint   Patient presents with   • Sleep Apnea   • Obesity       HPI:  This is a 76 y.o. years old patient who has a history of obstructive sleep apnea is here for  the annual compliance follow-up.  Sleep apnea is severe in severity with a AHI of 110/hr. Patient is using positive airway pressure therapy with auto CPAP and the symptoms of snoring, non-restorative sleep and daytime excessive sleepiness have improved significantly on the therapy. Normally goes to bed at 10 PM and wakes up at 6:30 AM.  The patient wakes up 2 time(s) during the night and has no problem going back to sleep.  Feels refreshed after waking up.  Patient also denies headaches and nasal congestion.   Her sleep study was conducted at [facility] 2011 and found to have severe sleep apnea with AHI of 110/h    Medications and allergies are reviewed by me and documented in the encounter.     SOCIAL ( habits pertaining to sleep medicine)  History tobacco use:No   History of alcohol use: 0 per week  Caffeine use: 2     REVIEW OF SYSTEMS:   [place] Sleepiness Scale :Total score: 5   Nasal congestion:Yes   Dry mouth/nose:No   Post nasal drip; No   Acid reflux/Heartburn:No   Abd bloating:No   Morning headache:No   Anxiety:No   Depression:No    PHYSICAL EXAMINATION:  CONSTITUTIONAL:  Vitals:    12/15/21 1300   BP: 161/70   BP Location: Left arm   Patient Position: Sitting   Pulse: 84   Temp: 97.6 °F (36.4 °C)   SpO2: 91%   Weight: 101 kg (222 lb 3.2 oz)   Height: 160 cm (63\")    Body mass index is 39.36 kg/m².   NOSE: nasal passages are clear, no nasal polyps, septum in the midline.  THROAT: throat is clear, oral airway Mallampati class 3  RESP SYSTEM: Breath " sounds are normal, no wheezes or crackles  CARDIOVASULAR: Heart rate is regular without murmur. No edema      Data reviewed:  The Smart card downloaded on 12/15/2021 has been reviewed independently by me for compliance and discussed the data with the patient.   Compliance; 100%  More than 4 hr use, 100%  Average use of the device 7 hours and 56 minutes per night  Residual AHI: 0.7 /hr (goal < 5.0 /hr)  Mask type: Full facemask  DME: [employer]      ASSESSMENT AND PLAN:  · Obstructive sleep apnea ( G 47.33).  The symptoms of sleep apnea have improved with the device and the treatment.  Patient's compliance with the device is excellent for treatment of sleep apnea.  I have independently reviewed the smart card down load and discussed with the patient the download data and encouarged the patient to continue to use the device.The residual AHI is acceptable. The device is benefiting the patient and the device is medically necessary.  Without proper control of sleep apnea and good compliance there is a increased risk for hypertension, diabetes mellitus and nonrestorative sleep with hypersomnia which can increase risk for motor vehicle accidents.  Untreated sleep apnea is also a risk factor for development of atrial fibrillation, pulmonary hypertension and stroke. The patient is also instructed to get the supplies from the [employer] and and change them on a regular basis.  A prescription for supplies has been sent to the [employer] company.  I have also discussed the good sleep hygiene habits and adequate amount of sleep needed for good health.  · Obesity, class 2 with BMI is Body mass index is 39.36 kg/m².. I have discuss the relationship between the weight and sleep apnea. The benefit of weight loss in reducing severity of sleep apnea was discussed. Discussed diet and exercise with the patient to achieve ideal BMI.   · Return in about 1 year (around 12/15/2022) for Annual visit with smartcard download. . Patient's questions  were answered.        Janak Leggett MD  Sleep Medicine.  Medical Director, [facility], [place] [facility] [place] [facility]  12/15/2021 ,

## 2021-12-27 ENCOUNTER — TELEPHONE (OUTPATIENT)
Dept: FAMILY MEDICINE CLINIC | Facility: CLINIC | Age: 76
End: 2021-12-27

## 2021-12-27 DIAGNOSIS — J32.9 SINUSITIS, UNSPECIFIED CHRONICITY, UNSPECIFIED LOCATION: Primary | ICD-10-CM

## 2021-12-27 RX ORDER — AZITHROMYCIN 250 MG/1
TABLET, FILM COATED ORAL
Qty: 6 TABLET | Refills: 0 | Status: SHIPPED | OUTPATIENT
Start: 2021-12-27 | End: 2022-02-09

## 2021-12-27 NOTE — TELEPHONE ENCOUNTER
Caller: Belem Acosta    Relationship: Self    Best call back number: [phone]    What medication are you requesting: ANTIBIOTIC FOR SINUS INFECTION    What are your current symptoms: CONGESTION, SINUS PRESSURE, HEADACHE    How long have you been experiencing symptoms: 3 DAYS  Have you had these symptoms before:    [x] Yes  [] No    Have you been treated for these symptoms before:   [x] Yes  [] No    If a prescription is needed, what is your preferred pharmacy and phone number: [facility] [address] - [phone] [facility] [phone] FX     Additional notes:

## 2022-01-24 DIAGNOSIS — E11.9 TYPE 2 DIABETES MELLITUS WITHOUT COMPLICATION, WITHOUT LONG-TERM CURRENT USE OF INSULIN: ICD-10-CM

## 2022-01-24 DIAGNOSIS — F41.9 ANXIETY: ICD-10-CM

## 2022-01-24 DIAGNOSIS — E03.9 HYPOTHYROIDISM, UNSPECIFIED TYPE: Primary | ICD-10-CM

## 2022-01-24 RX ORDER — LEVOTHYROXINE SODIUM 112 UG/1
112 TABLET ORAL
Qty: 90 TABLET | Refills: 1 | Status: SHIPPED | OUTPATIENT
Start: 2022-01-24 | End: 2022-08-29

## 2022-01-24 RX ORDER — INSULIN GLARGINE 300 U/ML
INJECTION, SOLUTION SUBCUTANEOUS DAILY
Qty: 9 PEN | Refills: 0 | Status: CANCELLED | OUTPATIENT
Start: 2022-01-24

## 2022-01-24 RX ORDER — PAROXETINE HYDROCHLORIDE 20 MG/1
20 TABLET, FILM COATED ORAL EVERY MORNING
Qty: 90 TABLET | Refills: 1 | Status: SHIPPED | OUTPATIENT
Start: 2022-01-24 | End: 2022-08-29

## 2022-01-31 ENCOUNTER — TELEPHONE (OUTPATIENT)
Dept: FAMILY MEDICINE CLINIC | Facility: CLINIC | Age: 77
End: 2022-01-31

## 2022-01-31 DIAGNOSIS — E11.9 TYPE 2 DIABETES MELLITUS WITHOUT COMPLICATION, WITHOUT LONG-TERM CURRENT USE OF INSULIN: Primary | ICD-10-CM

## 2022-01-31 NOTE — TELEPHONE ENCOUNTER
Caller: Belem Acosta    Relationship: Self    Best call back number:[phone]    Requested Prescriptions:   Requested Prescriptions      No prescriptions requested or ordered in this encounter    BD PEN MADDY UF JEANETTE 4MM 90S 4MMX32 GAUGE ULTRFINE NEEDLES    Pharmacy where request should be sent: [facility] - [address] [phone] [facility] [phone] FX     Additional details provided by patient: PATIENT STATES SHE HAS ALWAYS USED THE NEEDLES ABOVE TO INJECT HER INSULIN. THE 12.7MM ARE TOO BIG AND WILL NOT WORK FOR HER. SHE WOULD LIKE THE OTHER ONES STATED ABOVE. PLEASE CALL THE SCRIPT INTO THE PHARMACY ASAP. THESE NEWER NEEDLES HURT AND SHE CAN'T USE THEM. PLEASE ADD THE 4MM NEEDLES BACK ON HER MED LIST AND TAKE THE OTHER OFF.    Does the patient have less than a 3 day supply:  [] Yes  [x] No    Nae Riddle Rep   01/31/22 12:50 EST

## 2022-02-09 ENCOUNTER — OFFICE VISIT (OUTPATIENT)
Dept: FAMILY MEDICINE CLINIC | Facility: CLINIC | Age: 77
End: 2022-02-09

## 2022-02-09 VITALS
HEIGHT: 63 IN | WEIGHT: 228 LBS | HEART RATE: 73 BPM | SYSTOLIC BLOOD PRESSURE: 152 MMHG | DIASTOLIC BLOOD PRESSURE: 65 MMHG | BODY MASS INDEX: 40.4 KG/M2 | OXYGEN SATURATION: 97 %

## 2022-02-09 DIAGNOSIS — E11.9 TYPE 2 DIABETES MELLITUS WITHOUT COMPLICATION, WITH LONG-TERM CURRENT USE OF INSULIN: ICD-10-CM

## 2022-02-09 DIAGNOSIS — H81.13 BPPV (BENIGN PAROXYSMAL POSITIONAL VERTIGO), BILATERAL: Primary | ICD-10-CM

## 2022-02-09 DIAGNOSIS — Z79.4 TYPE 2 DIABETES MELLITUS WITHOUT COMPLICATION, WITH LONG-TERM CURRENT USE OF INSULIN: ICD-10-CM

## 2022-02-09 PROCEDURE — 99213 OFFICE O/P EST LOW 20 MIN: CPT | Performed by: NURSE PRACTITIONER

## 2022-02-09 RX ORDER — MECLIZINE HYDROCHLORIDE 25 MG/1
25 TABLET ORAL 3 TIMES DAILY PRN
Qty: 30 TABLET | Refills: 0 | Status: SHIPPED | OUTPATIENT
Start: 2022-02-09 | End: 2023-03-27

## 2022-02-09 RX ORDER — PEN NEEDLE, DIABETIC 32GX 5/32"
1 NEEDLE, DISPOSABLE MISCELLANEOUS DAILY
COMMUNITY
End: 2022-02-21 | Stop reason: SDUPTHER

## 2022-02-09 RX ORDER — INSULIN GLARGINE 300 U/ML
34 INJECTION, SOLUTION SUBCUTANEOUS DAILY
Qty: 9 PEN | Refills: 1 | Status: SHIPPED | OUTPATIENT
Start: 2022-02-09 | End: 2023-03-22

## 2022-02-09 NOTE — PROGRESS NOTES
Chief Complaint  Dizziness    Subjective          Belem Acosta presents to [facility]  History of Present Illness  Pt presents today for possible vertigo.    Pt states she believes she is experiencing vertigo. Pt states starting last Friday night when turning over in bed everything will go back and forth for a couple of mins. This only happens when turning over in bed. Reports it is worse on the right than left. States a friend gave her meclizine and it really helped.     Pt states in Dec she was having some sinus issues. Pt states she was having ear pain in her left ear. Pt states everything cleared up after finishing the zpac.    Labs 4/1/21  A1C 7.7        Past Medical History:   Diagnosis Date   • Abnormal bone density screening 03/2019    Normal   • Ankle pain    • Anxiety    • Arthritis    • Bunion    • Corns and callus    • Diabetes mellitus, type 2 (HCC)    • Hammertoe    • Hyperglycemia    • Hyperlipidemia    • Hypertension    • Hypothyroidism    • Impaired fasting glucose 08/19/2014   • Panic disorder    • Pap smear for cervical cancer screening 2019    NL PER PT SEES DR. GEORGETTE [facility]   • Screening mammogram, encounter for 05/2020    NORMAL DR. PALOMINO [facility] [place]   • Sensorineural hearing loss (SNHL) of both ears    • Sleep apnea 08/26/2015      Family History   Problem Relation Age of Onset   • Heart disease Mother    • Heart failure Mother    • Hypertension Mother    • Diabetes Mother    • Emphysema Father    • Heart disease Father    • Heart failure Father    • Hyperlipidemia Sister    • Pancreatic cancer Brother    • Hyperlipidemia Brother    • Heart disease Maternal Grandmother    • Heart disease Maternal Grandfather    • Stroke Other    • Heart disease Other       Past Surgical History:   Procedure Laterality Date   • CARPAL TUNNEL INJECTION  2002 2004   • CATARACT EXTRACTION WITH INTRAOCULAR LENS IMPLANT  2012    RIGHT EYE   • CHOLECYSTECTOMY   "1982   • COLONOSCOPY  02/2017    POLYPS, TUBULAR ADENOMA   • ROTATOR CUFF REPAIR  06/2013    Left   • THYROIDECTOMY, PARTIAL  1997    Rt thyroid d/t growth-bengin   • WRIST SURGERY  2008          Current Outpatient Medications:   •  aspirin (aspirin) 81 MG EC tablet, Aspirin Low Dose 81 mg oral tablet,delayed release (DR/EC) take 1 tablet (81 mg) by oral route once daily   Active, Disp: , Rfl:   •  atorvastatin (LIPITOR) 40 MG tablet, Take 40 mg by mouth Daily., Disp: , Rfl:   •  bimatoprost (Lumigan) 0.01 % ophthalmic drops, 1 drop., Disp: , Rfl:   •  bisoprolol (ZEBeta) 5 MG tablet, Take 2.5 mg by mouth Daily., Disp: , Rfl:   •  Farxiga 10 MG tablet, , Disp: , Rfl:   •  fluticasone (FLONASE) 50 MCG/ACT nasal spray, 2 sprays into the nostril(s) as directed by provider Daily., Disp: , Rfl:   •  Insulin Pen Needle (BD Pen Needle Juana U/F) 32G X 4 MM misc, 1 each Daily. Once in morning, Disp: , Rfl:   •  ipratropium (ATROVENT) 0.06 % nasal spray, 2 sprays into the nostril(s) as directed by provider 4 (Four) Times a Day., Disp: , Rfl:   •  levothyroxine (SYNTHROID, LEVOTHROID) 112 MCG tablet, Take 1 tablet by mouth Every Morning., Disp: 90 tablet, Rfl: 1  •  PARoxetine (PAXIL) 20 MG tablet, Take 1 tablet by mouth Every Morning., Disp: 90 tablet, Rfl: 1  •  simvastatin (ZOCOR) 40 MG tablet, , Disp: , Rfl:   •  Toujeo Max SoloStar 300 UNIT/ML solution pen-injector injection, Inject 34 Units under the skin into the appropriate area as directed Daily., Disp: 9 pen, Rfl: 1  •  azithromycin (Zithromax Z-Kvng) 250 MG tablet, Take 2 tablets by mouth on day 1, then 1 tablet daily on days 2-5, Disp: 6 tablet, Rfl: 0  •  meclizine (ANTIVERT) 25 MG tablet, Take 1 tablet by mouth 3 (Three) Times a Day As Needed for Dizziness., Disp: 30 tablet, Rfl: 0    Objective     Vital Signs:     /65 (BP Location: Right arm)   Pulse 73   Ht 160 cm (63\")   Wt 103 kg (228 lb)   SpO2 97%   BMI 40.39 kg/m²    Estimated body mass index is " "40.39 kg/m² as calculated from the following:    Height as of this encounter: 160 cm (63\").    Weight as of this encounter: 103 kg (228 lb).     Wt Readings from Last 3 Encounters:   02/09/22 103 kg (228 lb)   12/15/21 101 kg (222 lb 3.2 oz)   09/16/21 101 kg (223 lb)     BP Readings from Last 3 Encounters:   02/09/22 152/65   12/15/21 161/70   09/16/21 122/68       Physical Exam  Constitutional:       Appearance: Normal appearance.   HENT:      Right Ear: Hearing, ear canal and external ear normal.      Left Ear: Tympanic membrane, ear canal and external ear normal. Decreased hearing noted.      Ears:      Comments: Minimal clear fluid behind b/l TMs  Cardiovascular:      Rate and Rhythm: Normal rate and regular rhythm.      Pulses: Normal pulses.      Heart sounds: Normal heart sounds.   Pulmonary:      Effort: Pulmonary effort is normal.      Breath sounds: Normal breath sounds.   Skin:     General: Skin is warm and dry.   Neurological:      General: No focal deficit present.      Mental Status: She is alert and oriented to person, place, and time.   Psychiatric:         Mood and Affect: Mood normal.         Behavior: Behavior normal.            Result Review :                  Assessment and Plan      Diagnoses and all orders for this visit:    1. BPPV (benign paroxysmal positional vertigo), bilateral (Primary)  -     Cancel: Ambulatory Referral to Physical Therapy Evaluate and treat (BPPV)  -     Ambulatory Referral to Physical Therapy Evaluate and treat (BPPV)  -     meclizine (ANTIVERT) 25 MG tablet; Take 1 tablet by mouth 3 (Three) Times a Day As Needed for Dizziness.  Dispense: 30 tablet; Refill: 0    2. Type 2 diabetes mellitus without complication, with long-term current use of insulin ([facility])  -     Toujeo Max SoloStar 300 UNIT/ML solution pen-injector injection; Inject 34 Units under the skin into the appropriate area as directed Daily.  Dispense: 9 pen; Refill: 1         Follow Up       Return for Next " scheduled follow up.    Patient was given instructions and counseling regarding her condition or for health maintenance advice. Please see specific information pulled into the AVS if appropriate.     I have reviewed information obtained and documented by others and I have confirmed the accuracy of this documented note.    BRISSA Melvin

## 2022-02-10 ENCOUNTER — TREATMENT (OUTPATIENT)
Dept: PHYSICAL THERAPY | Facility: CLINIC | Age: 77
End: 2022-02-10

## 2022-02-10 DIAGNOSIS — R42 DIZZINESS: Primary | ICD-10-CM

## 2022-02-10 PROCEDURE — 97161 PT EVAL LOW COMPLEX 20 MIN: CPT | Performed by: PHYSICAL THERAPIST

## 2022-02-10 NOTE — PROGRESS NOTES
Physical Therapy Initial Evaluation and Plan of Care    Patient: Belem Acosta   : 1945  Diagnosis/ICD-10 Code:  Dizziness [R42]  Referring practitioner: Elena Nuñez, *  Date of Initial Visit: 2/10/2022  Today's Date: 2/10/2022  Patient seen for 1 sessions           Subjective Questionnaire: DHI: 12% limited      Subjective Evaluation    History of Present Illness  Mechanism of injury: Pt reports Friday night she went to turn over in bed to the  and the room started spinning.  Pt reports since then it has continued to happen with movement in bed.  Pt was prescribed Meclizine which has helped a lot.      Medical history: HTN, diabetes, faith, thyroid surgery    Pain  Current pain ratin    Social Support  Lives with: alone    Treatments  No previous or current treatments  Patient Goals  Patient goal: get rid of dizziness           Objective          Ambulation     Comments   Normal gait mechanics; good balance    Functional Assessment     Comments  L love halpike: negative  R love halpike: negative  Horizontal canal test: negative bilaterally    Smooth pursuits horizontal: WNL  Smooth pursuits vertical: WNL  Saccades horizontal: WNL  Saccades vertical: WNL  VOR horizontal: WNL  VOR vertical: WNL          See Exercise, Manual, and Modality Logs for complete treatment.       Assessment & Plan     Assessment  Other impairment: dizziness  Functional Limitations: sleeping and moving in bed  Assessment details: Pt presents with limitations, noted below, that impede her ability to roll over in bed.  The patient presents with a diagnosis of BPPV and has dizziness with rolling in bed and will benefit from therapeutic exercises, manual therapy, and modalities to improve tolerance to functional activities. Pt tested negative for BPPV in all canals today.  Will follow up with pt next week to ensure she doesn't have more spinning.  Will discharge at that time if PT is not necessary.  The skills of a  therapist will be required to safely and effectively implement the following treatment plan to restore maximal level of function.    Goals  Plan Goals: 1. Mobility: Walking/Moving Around Functional Limitation     LTG 1: 12 weeks:  The patient will demonstrate 0% limitation by achieving a score of 0 on the Dizziness Handicap Inventory.   STATUS:  New   STG 1a: 6 weeks:  The patient will demonstrate 4% limitation by achieving a score of 4 on the Dizziness Handicap Inventory.     STATUS:  New     TREATMENT:  Manual therapy, neuromuscular re-education, gait training, canalith repositioning maneuver, therapeutic exercise, home exercise instruction, and modalities as needed to include: moist heat, electrical stimulation, and ultrasound.          Plan  Therapy options: will be seen for skilled therapy services  Planned therapy interventions: neuromuscular re-education  Other planned therapy interventions: canalith repositioning maneuver  Frequency: 3x week  Duration in weeks: 12  Treatment plan discussed with: patient        History # of Personal Factors and/or Comorbidities: LOW (0)  Examination of Body System(s): # of elements: LOW (1-2)  Clinical Presentation: STABLE   Clinical Decision Making: LOW       Timed:         Manual Therapy:         mins  88165;     Therapeutic Exercise:         mins  69710;     Neuromuscular Shaq:        mins  60340;    Therapeutic Activity:          mins  32778;     Gait Training:           mins  00797;     Ultrasound:          mins  44591;    Ionto                                   mins   68907  Self Care                            mins   71015  Canalith Repos         mins 23954      Un-Timed:  Electrical Stimulation:         mins  30272 ( );  Dry Needling          mins self-pay  Traction          mins 31903  Low Eval     15     Mins  77690  Mod Eval          Mins  93527  High Eval                            Mins  14170  Re-Eval                               mins  20518        Timed  Treatment:   0   mins   Total Treatment:     15   mins    PT SIGNATURE: Electronically signed by LEO Nogueira License: [id number]      Initial Certification  Certification Period: 2/10/2022 thru 5/10/2022  I certify that the therapy services are furnished while this patient is under my care.  The services outlined above are required by this patient, and will be reviewed every 90 days.     PHYSICIAN: Elena Nuñez APRN  NPI: [id number]                                      DATE:        Please sign and return via fax to [phone].Thank you, [facility].

## 2022-02-21 DIAGNOSIS — Z79.4 TYPE 2 DIABETES MELLITUS WITHOUT COMPLICATION, WITH LONG-TERM CURRENT USE OF INSULIN: Primary | ICD-10-CM

## 2022-02-21 DIAGNOSIS — E11.9 TYPE 2 DIABETES MELLITUS WITHOUT COMPLICATION, WITH LONG-TERM CURRENT USE OF INSULIN: Primary | ICD-10-CM

## 2022-02-21 RX ORDER — PEN NEEDLE, DIABETIC 32GX 5/32"
1 NEEDLE, DISPOSABLE MISCELLANEOUS DAILY
Qty: 100 EACH | Refills: 3 | Status: SHIPPED | OUTPATIENT
Start: 2022-02-21

## 2022-02-28 ENCOUNTER — DOCUMENTATION (OUTPATIENT)
Dept: PHYSICAL THERAPY | Facility: CLINIC | Age: 77
End: 2022-02-28

## 2022-02-28 DIAGNOSIS — E11.9 TYPE 2 DIABETES MELLITUS WITHOUT COMPLICATION, WITHOUT LONG-TERM CURRENT USE OF INSULIN: Primary | ICD-10-CM

## 2022-02-28 RX ORDER — DAPAGLIFLOZIN 10 MG/1
10 TABLET, FILM COATED ORAL DAILY
Qty: 90 TABLET | Refills: 1 | Status: SHIPPED | OUTPATIENT
Start: 2022-02-28 | End: 2022-10-05

## 2022-02-28 NOTE — PROGRESS NOTES
Pt attended her initial evaluation and was not having dizziness/spinning at the time of the evaluation.  She continues to not have any symptoms of vertigo and does not need PT at this point in time.  She will be discharged from PT.    Goals: 1. Mobility: Walking/Moving Around Functional Limitation                       LTG 1: 12 weeks:  The patient will demonstrate 0% limitation by achieving a score of 0 on the Dizziness Handicap Inventory.   STATUS:  met  STG 1a: 6 weeks:  The patient will demonstrate 4% limitation by achieving a score of 4 on the Dizziness Handicap Inventory.     STATUS:  met

## 2022-03-16 ENCOUNTER — OFFICE VISIT (OUTPATIENT)
Dept: FAMILY MEDICINE CLINIC | Facility: CLINIC | Age: 77
End: 2022-03-16

## 2022-03-16 VITALS
HEIGHT: 63 IN | WEIGHT: 223.6 LBS | HEART RATE: 69 BPM | OXYGEN SATURATION: 95 % | BODY MASS INDEX: 39.62 KG/M2 | SYSTOLIC BLOOD PRESSURE: 135 MMHG | DIASTOLIC BLOOD PRESSURE: 53 MMHG

## 2022-03-16 DIAGNOSIS — E03.9 HYPOTHYROIDISM, UNSPECIFIED TYPE: ICD-10-CM

## 2022-03-16 DIAGNOSIS — Z23 NEED FOR SHINGLES VACCINE: Primary | ICD-10-CM

## 2022-03-16 DIAGNOSIS — E11.9 TYPE 2 DIABETES MELLITUS WITHOUT COMPLICATION, WITH LONG-TERM CURRENT USE OF INSULIN: ICD-10-CM

## 2022-03-16 DIAGNOSIS — Z79.4 TYPE 2 DIABETES MELLITUS WITHOUT COMPLICATION, WITH LONG-TERM CURRENT USE OF INSULIN: ICD-10-CM

## 2022-03-16 DIAGNOSIS — I10 PRIMARY HYPERTENSION: ICD-10-CM

## 2022-03-16 DIAGNOSIS — E78.5 HYPERLIPIDEMIA, UNSPECIFIED HYPERLIPIDEMIA TYPE: ICD-10-CM

## 2022-03-16 PROCEDURE — 99214 OFFICE O/P EST MOD 30 MIN: CPT | Performed by: NURSE PRACTITIONER

## 2022-03-16 NOTE — PROGRESS NOTES
"Chief Complaint  Diabetes and Hypothyroidism    Subjective          Belem Acosta presents to [facility]  History of Present Illness     DM-bs running 120-130s at home    htn-bp running 130s'/50-60's    She is debating on going to [place] [place] for wt loss. [facility] is a [facility].     Hypothyroidism-stable on synthroid. Ordered tsh.     Due for shingles vaccine-ordered    She  has a past medical history of Abnormal bone density screening (03/2019), Ankle pain, Anxiety, Arthritis, Bunion, Corns and callus, Diabetes mellitus, type 2 (HCC), Hammertoe, Hyperglycemia, Hyperlipidemia, Hypertension, Hypothyroidism, Impaired fasting glucose (08/19/2014), Panic disorder, Pap smear for cervical cancer screening (2019), Screening mammogram, encounter for (05/2020), Sensorineural hearing loss (SNHL) of both ears, and Sleep apnea (08/26/2015).     Objective   Vital Signs:   /53 (BP Location: Left arm, Patient Position: Sitting, Cuff Size: Adult)   Pulse 69   Ht 160 cm (63\")   Wt 101 kg (223 lb 9.6 oz)   SpO2 95%   BMI 39.61 kg/m²     Physical Exam  Constitutional:       Appearance: Normal appearance.   Neck:      Thyroid: No thyroid mass, thyromegaly or thyroid tenderness.      Vascular: No carotid bruit.   Cardiovascular:      Rate and Rhythm: Normal rate and regular rhythm.      Pulses: Normal pulses.      Heart sounds: Normal heart sounds.   Pulmonary:      Effort: Pulmonary effort is normal.      Breath sounds: Normal breath sounds.   Musculoskeletal:      Right lower leg: No edema.      Left lower leg: No edema.   Skin:     General: Skin is warm and dry.   Neurological:      General: No focal deficit present.      Mental Status: She is alert and oriented to person, place, and time.   Psychiatric:         Mood and Affect: Mood normal.         Behavior: Behavior normal.        Result Review :   The following data was reviewed by: BRISSA Melvin on 03/16/2022:  CMP  "   CMP 4/1/21 10/14/21   Glucose 133 (A) 158 (A)   BUN 16 15   Creatinine 0.60 0.55 (A)   eGFR Non African Am  108   Sodium 141 141   Potassium 3.8 3.9   Chloride 105 102   Calcium 9.4 10.2   Albumin 4.3 4.50   Total Bilirubin 0.52 0.4   Alkaline Phosphatase 98 99   AST (SGOT) 21 24   ALT (SGPT) 24 26   (A) Abnormal value            CBC    CBC 4/1/21   WBC 9.60   RBC 5.43 (A)   Hemoglobin 15.3   Hematocrit 47.7 (A)   MCV 87.8   MCH 28.2   MCHC 32.1 (A)   RDW 13.7   Platelets 341   (A) Abnormal value            CBC w/diff    CBC w/Diff 4/1/21   WBC 9.60   RBC 5.43 (A)   Hemoglobin 15.3   Hematocrit 47.7 (A)   MCV 87.8   MCH 28.2   MCHC 32.1 (A)   RDW 13.7   Platelets 341   Neutrophil Rel % 49.7   Lymphocyte Rel % 41.9   Monocyte Rel % 6.7   Eosinophil Rel % 0.9   Basophil Rel % 0.5   (A) Abnormal value            Lipid Panel    Lipid Panel 4/1/21   Total Cholesterol 171   Triglycerides 123   HDL Cholesterol 64 (A)   VLDL Cholesterol 25   LDL Cholesterol  82   (A) Abnormal value       Comments are available for some flowsheets but are not being displayed.           TSH    TSH 4/1/21   TSH 1.050           Most Recent A1C    HGBA1C Most Recent 10/14/21   Hemoglobin A1C 7.70 (A)   (A) Abnormal value                         Past Surgical History:   Procedure Laterality Date   • CARPAL TUNNEL INJECTION  2002 2004   • CATARACT EXTRACTION WITH INTRAOCULAR LENS IMPLANT  2012    RIGHT EYE   • CHOLECYSTECTOMY  1982   • COLONOSCOPY  02/2017    POLYPS, TUBULAR ADENOMA   • ROTATOR CUFF REPAIR  06/2013    Left   • THYROIDECTOMY, PARTIAL  1997    Rt thyroid d/t growth-bengin   • WRIST SURGERY  2008      Family History   Problem Relation Age of Onset   • Heart disease Mother    • Heart failure Mother    • Hypertension Mother    • Diabetes Mother    • Emphysema Father    • Heart disease Father    • Heart failure Father    • Hyperlipidemia Sister    • Pancreatic cancer Brother    • Hyperlipidemia Brother    • Heart disease Maternal  Grandmother    • Heart disease Maternal Grandfather    • Stroke Other    • Heart disease Other         Current Outpatient Medications:   •  aspirin (aspirin) 81 MG EC tablet, Aspirin Low Dose 81 mg oral tablet,delayed release (DR/EC) take 1 tablet (81 mg) by oral route once daily   Active, Disp: , Rfl:   •  atorvastatin (LIPITOR) 40 MG tablet, Take 40 mg by mouth Daily., Disp: , Rfl:   •  bimatoprost (Lumigan) 0.01 % ophthalmic drops, 1 drop., Disp: , Rfl:   •  bisoprolol (ZEBeta) 5 MG tablet, Take 2.5 mg by mouth Daily., Disp: , Rfl:   •  Farxiga 10 MG tablet, Take 10 mg by mouth Daily., Disp: 90 tablet, Rfl: 1  •  fluticasone (FLONASE) 50 MCG/ACT nasal spray, 2 sprays into the nostril(s) as directed by provider Daily., Disp: , Rfl:   •  Insulin Pen Needle (BD Pen Needle Juana U/F) 32G X 4 MM misc, 1 each Daily. Once in morning, Disp: 100 each, Rfl: 3  •  ipratropium (ATROVENT) 0.06 % nasal spray, 2 sprays into the nostril(s) as directed by provider 4 (Four) Times a Day., Disp: , Rfl:   •  levothyroxine (SYNTHROID, LEVOTHROID) 112 MCG tablet, Take 1 tablet by mouth Every Morning., Disp: 90 tablet, Rfl: 1  •  meclizine (ANTIVERT) 25 MG tablet, Take 1 tablet by mouth 3 (Three) Times a Day As Needed for Dizziness., Disp: 30 tablet, Rfl: 0  •  PARoxetine (PAXIL) 20 MG tablet, Take 1 tablet by mouth Every Morning., Disp: 90 tablet, Rfl: 1  •  simvastatin (ZOCOR) 40 MG tablet, , Disp: , Rfl:   •  Toujeo Max SoloStar 300 UNIT/ML solution pen-injector injection, Inject 34 Units under the skin into the appropriate area as directed Daily., Disp: 9 pen, Rfl: 1   Assessment and Plan    Diagnoses and all orders for this visit:    1. Need for shingles vaccine (Primary)  -     Shingrix Vaccine; Future    2. Hyperlipidemia, unspecified hyperlipidemia type  -     Lipid Panel; Future    3. Primary hypertension  Comments:  bp running 130s'/50-60's-stable  Orders:  -     Comprehensive Metabolic Panel; Future    4. Type 2 diabetes  mellitus without complication, with long-term current use of insulin (HCC)  Comments:  bs running 120-130s at home. Last a1c was 7.70%-ordered repeat a1c  Orders:  -     Hemoglobin A1c; Future  -     CBC & Differential; Future    5. Hypothyroidism, unspecified type  Comments:  stable on synthroid. Ordered tsh.   Orders:  -     TSH; Future        Follow Up   No follow-ups on file.  Patient was given instructions and counseling regarding her condition or for health maintenance advice. Please see specific information pulled into the AVS if appropriate.     Belem ELGIN Acosta  reports that she has never smoked. She has never used smokeless tobacco..                Elena Nuñez, BRISSA    The patient is advised to continue current medications.

## 2022-03-29 ENCOUNTER — LAB (OUTPATIENT)
Dept: LAB | Facility: HOSPITAL | Age: 77
End: 2022-03-29

## 2022-03-29 DIAGNOSIS — E11.9 TYPE 2 DIABETES MELLITUS WITHOUT COMPLICATION, WITH LONG-TERM CURRENT USE OF INSULIN: ICD-10-CM

## 2022-03-29 DIAGNOSIS — Z79.4 TYPE 2 DIABETES MELLITUS WITHOUT COMPLICATION, WITH LONG-TERM CURRENT USE OF INSULIN: ICD-10-CM

## 2022-03-29 DIAGNOSIS — E03.9 HYPOTHYROIDISM, UNSPECIFIED TYPE: ICD-10-CM

## 2022-03-29 DIAGNOSIS — E78.5 HYPERLIPIDEMIA, UNSPECIFIED HYPERLIPIDEMIA TYPE: ICD-10-CM

## 2022-03-29 DIAGNOSIS — I10 PRIMARY HYPERTENSION: ICD-10-CM

## 2022-03-29 LAB
ALBUMIN SERPL-MCNC: 4.3 G/DL (ref 3.5–5.2)
ALBUMIN/GLOB SERPL: 1.7 G/DL
ALP SERPL-CCNC: 92 U/L (ref 39–117)
ALT SERPL W P-5'-P-CCNC: 17 U/L (ref 1–33)
ANION GAP SERPL CALCULATED.3IONS-SCNC: 11.8 MMOL/L (ref 5–15)
AST SERPL-CCNC: 17 U/L (ref 1–32)
BASOPHILS # BLD AUTO: 0.06 10*3/MM3 (ref 0–0.2)
BASOPHILS NFR BLD AUTO: 0.7 % (ref 0–1.5)
BILIRUB SERPL-MCNC: 0.4 MG/DL (ref 0–1.2)
BUN SERPL-MCNC: 13 MG/DL (ref 8–23)
BUN/CREAT SERPL: 24.1 (ref 7–25)
CALCIUM SPEC-SCNC: 9.3 MG/DL (ref 8.6–10.5)
CHLORIDE SERPL-SCNC: 105 MMOL/L (ref 98–107)
CHOLEST SERPL-MCNC: 156 MG/DL (ref 0–200)
CO2 SERPL-SCNC: 24.2 MMOL/L (ref 22–29)
CREAT SERPL-MCNC: 0.54 MG/DL (ref 0.57–1)
DEPRECATED RDW RBC AUTO: 43.5 FL (ref 37–54)
EGFRCR SERPLBLD CKD-EPI 2021: 96.7 ML/MIN/1.73
EOSINOPHIL # BLD AUTO: 0.12 10*3/MM3 (ref 0–0.4)
EOSINOPHIL NFR BLD AUTO: 1.3 % (ref 0.3–6.2)
ERYTHROCYTE [DISTWIDTH] IN BLOOD BY AUTOMATED COUNT: 13.6 % (ref 12.3–15.4)
GLOBULIN UR ELPH-MCNC: 2.5 GM/DL
GLUCOSE SERPL-MCNC: 122 MG/DL (ref 65–99)
HBA1C MFR BLD: 8.1 % (ref 4.8–5.6)
HCT VFR BLD AUTO: 44.7 % (ref 34–46.6)
HDLC SERPL-MCNC: 63 MG/DL (ref 40–60)
HGB BLD-MCNC: 14.6 G/DL (ref 12–15.9)
IMM GRANULOCYTES # BLD AUTO: 0.04 10*3/MM3 (ref 0–0.05)
IMM GRANULOCYTES NFR BLD AUTO: 0.4 % (ref 0–0.5)
LDLC SERPL CALC-MCNC: 74 MG/DL (ref 0–100)
LDLC/HDLC SERPL: 1.14 {RATIO}
LYMPHOCYTES # BLD AUTO: 3.31 10*3/MM3 (ref 0.7–3.1)
LYMPHOCYTES NFR BLD AUTO: 36.8 % (ref 19.6–45.3)
MCH RBC QN AUTO: 28.5 PG (ref 26.6–33)
MCHC RBC AUTO-ENTMCNC: 32.7 G/DL (ref 31.5–35.7)
MCV RBC AUTO: 87.1 FL (ref 79–97)
MONOCYTES # BLD AUTO: 0.61 10*3/MM3 (ref 0.1–0.9)
MONOCYTES NFR BLD AUTO: 6.8 % (ref 5–12)
NEUTROPHILS NFR BLD AUTO: 4.86 10*3/MM3 (ref 1.7–7)
NEUTROPHILS NFR BLD AUTO: 54 % (ref 42.7–76)
NRBC BLD AUTO-RTO: 0 /100 WBC (ref 0–0.2)
PLATELET # BLD AUTO: 333 10*3/MM3 (ref 140–450)
PMV BLD AUTO: 8.8 FL (ref 6–12)
POTASSIUM SERPL-SCNC: 3.8 MMOL/L (ref 3.5–5.2)
PROT SERPL-MCNC: 6.8 G/DL (ref 6–8.5)
RBC # BLD AUTO: 5.13 10*6/MM3 (ref 3.77–5.28)
SODIUM SERPL-SCNC: 141 MMOL/L (ref 136–145)
TRIGL SERPL-MCNC: 107 MG/DL (ref 0–150)
TSH SERPL DL<=0.05 MIU/L-ACNC: 1.87 UIU/ML (ref 0.27–4.2)
VLDLC SERPL-MCNC: 19 MG/DL (ref 5–40)
WBC NRBC COR # BLD: 9 10*3/MM3 (ref 3.4–10.8)

## 2022-03-29 PROCEDURE — 85025 COMPLETE CBC W/AUTO DIFF WBC: CPT

## 2022-03-29 PROCEDURE — 80053 COMPREHEN METABOLIC PANEL: CPT

## 2022-03-29 PROCEDURE — 36415 COLL VENOUS BLD VENIPUNCTURE: CPT

## 2022-03-29 PROCEDURE — 84443 ASSAY THYROID STIM HORMONE: CPT

## 2022-03-29 PROCEDURE — 83036 HEMOGLOBIN GLYCOSYLATED A1C: CPT

## 2022-03-29 PROCEDURE — 80061 LIPID PANEL: CPT

## 2022-03-30 ENCOUNTER — TELEPHONE (OUTPATIENT)
Dept: FAMILY MEDICINE CLINIC | Facility: CLINIC | Age: 77
End: 2022-03-30

## 2022-03-30 NOTE — TELEPHONE ENCOUNTER
----- Message from BRISSA Melvin sent at 3/29/2022 10:11 PM EDT -----  TSH, lipid,  WNL. A1C 8.10%-she felt it was going to be a little higher this time-work on low carb/low sugar diet and reheck in . CBC WNL

## 2022-04-08 RX ORDER — BISOPROLOL FUMARATE 5 MG/1
2.5 TABLET, FILM COATED ORAL DAILY
Qty: 90 TABLET | Refills: 0 | Status: SHIPPED | OUTPATIENT
Start: 2022-04-08 | End: 2022-10-05

## 2022-04-11 DIAGNOSIS — J06.9 UPPER RESPIRATORY TRACT INFECTION, UNSPECIFIED TYPE: Primary | ICD-10-CM

## 2022-04-11 RX ORDER — AZITHROMYCIN 250 MG/1
TABLET, FILM COATED ORAL
Qty: 6 TABLET | Refills: 0 | Status: SHIPPED | OUTPATIENT
Start: 2022-04-11 | End: 2022-12-14

## 2022-05-18 RX ORDER — SIMVASTATIN 40 MG
TABLET ORAL
Qty: 90 TABLET | Refills: 1 | Status: SHIPPED | OUTPATIENT
Start: 2022-05-18 | End: 2023-01-09

## 2022-05-18 RX ORDER — IPRATROPIUM BROMIDE 42 UG/1
SPRAY, METERED NASAL
Qty: 45 ML | Refills: 1 | Status: SHIPPED | OUTPATIENT
Start: 2022-05-18

## 2022-08-01 PROCEDURE — U0004 COV-19 TEST NON-CDC HGH THRU: HCPCS | Performed by: FAMILY MEDICINE

## 2022-08-28 DIAGNOSIS — E03.9 HYPOTHYROIDISM, UNSPECIFIED TYPE: ICD-10-CM

## 2022-08-28 DIAGNOSIS — F41.9 ANXIETY: ICD-10-CM

## 2022-08-29 RX ORDER — LEVOTHYROXINE SODIUM 112 UG/1
TABLET ORAL
Qty: 90 TABLET | Refills: 0 | Status: SHIPPED | OUTPATIENT
Start: 2022-08-29 | End: 2022-11-08

## 2022-08-29 RX ORDER — PAROXETINE HYDROCHLORIDE 20 MG/1
TABLET, FILM COATED ORAL
Qty: 90 TABLET | Refills: 0 | Status: SHIPPED | OUTPATIENT
Start: 2022-08-29 | End: 2022-11-08

## 2022-10-04 DIAGNOSIS — E11.9 TYPE 2 DIABETES MELLITUS WITHOUT COMPLICATION, WITHOUT LONG-TERM CURRENT USE OF INSULIN: ICD-10-CM

## 2022-10-05 RX ORDER — DAPAGLIFLOZIN 10 MG/1
TABLET, FILM COATED ORAL
Qty: 90 TABLET | Refills: 3 | Status: SHIPPED | OUTPATIENT
Start: 2022-10-05

## 2022-10-05 RX ORDER — BISOPROLOL FUMARATE 5 MG/1
TABLET, FILM COATED ORAL
Qty: 90 TABLET | Refills: 3 | Status: SHIPPED | OUTPATIENT
Start: 2022-10-05

## 2022-11-08 DIAGNOSIS — E03.9 HYPOTHYROIDISM, UNSPECIFIED TYPE: ICD-10-CM

## 2022-11-08 DIAGNOSIS — F41.9 ANXIETY: ICD-10-CM

## 2022-11-08 RX ORDER — PAROXETINE HYDROCHLORIDE 20 MG/1
TABLET, FILM COATED ORAL
Qty: 90 TABLET | Refills: 0 | Status: SHIPPED | OUTPATIENT
Start: 2022-11-08 | End: 2023-02-10

## 2022-11-08 RX ORDER — LEVOTHYROXINE SODIUM 112 UG/1
TABLET ORAL
Qty: 90 TABLET | Refills: 0 | Status: SHIPPED | OUTPATIENT
Start: 2022-11-08 | End: 2023-02-10

## 2022-12-14 ENCOUNTER — OFFICE VISIT (OUTPATIENT)
Dept: FAMILY MEDICINE CLINIC | Facility: CLINIC | Age: 77
End: 2022-12-14

## 2022-12-14 VITALS
HEART RATE: 75 BPM | WEIGHT: 229.2 LBS | HEIGHT: 63 IN | OXYGEN SATURATION: 96 % | BODY MASS INDEX: 40.61 KG/M2 | DIASTOLIC BLOOD PRESSURE: 64 MMHG | SYSTOLIC BLOOD PRESSURE: 131 MMHG

## 2022-12-14 DIAGNOSIS — Z00.00 HEALTHCARE MAINTENANCE: ICD-10-CM

## 2022-12-14 DIAGNOSIS — E55.9 VITAMIN D DEFICIENCY: ICD-10-CM

## 2022-12-14 DIAGNOSIS — Z11.59 ENCOUNTER FOR HEPATITIS C SCREENING TEST FOR LOW RISK PATIENT: ICD-10-CM

## 2022-12-14 DIAGNOSIS — I10 PRIMARY HYPERTENSION: ICD-10-CM

## 2022-12-14 DIAGNOSIS — E11.9 TYPE 2 DIABETES MELLITUS WITHOUT COMPLICATION, WITHOUT LONG-TERM CURRENT USE OF INSULIN: ICD-10-CM

## 2022-12-14 DIAGNOSIS — E03.9 HYPOTHYROIDISM, UNSPECIFIED TYPE: Primary | ICD-10-CM

## 2022-12-14 DIAGNOSIS — E78.5 HYPERLIPIDEMIA, UNSPECIFIED HYPERLIPIDEMIA TYPE: ICD-10-CM

## 2022-12-14 PROCEDURE — G0439 PPPS, SUBSEQ VISIT: HCPCS | Performed by: NURSE PRACTITIONER

## 2022-12-14 PROCEDURE — 1126F AMNT PAIN NOTED NONE PRSNT: CPT | Performed by: NURSE PRACTITIONER

## 2022-12-14 PROCEDURE — 1159F MED LIST DOCD IN RCRD: CPT | Performed by: NURSE PRACTITIONER

## 2022-12-14 PROCEDURE — 1170F FXNL STATUS ASSESSED: CPT | Performed by: NURSE PRACTITIONER

## 2022-12-14 PROCEDURE — 1160F RVW MEDS BY RX/DR IN RCRD: CPT | Performed by: NURSE PRACTITIONER

## 2022-12-14 PROCEDURE — 99213 OFFICE O/P EST LOW 20 MIN: CPT | Performed by: NURSE PRACTITIONER

## 2022-12-14 NOTE — PROGRESS NOTES
The ABCs of the Annual Wellness Visit  Subsequent Medicare Wellness Visit    Subjective    Belem Acosta is a 77 y.o. female who presents for a Subsequent Medicare Wellness Visit.    The following portions of the patient's history were reviewed and   updated as appropriate: allergies, current medications, past family history, past medical history, past social history, past surgical history and problem list.    Compared to one year ago, the patient feels her physical   health is the same.    Compared to one year ago, the patient feels her mental   health is better.    Recent Hospitalizations:  She was not admitted to the hospital during the last year.       Current Medical Providers:  Patient Care Team:  Elena Nuñez APRN as PCP - General (Nurse Practitioner)    Outpatient Medications Prior to Visit   Medication Sig Dispense Refill   • aspirin (aspirin) 81 MG EC tablet Aspirin Low Dose 81 mg oral tablet,delayed release (DR/EC) take 1 tablet (81 mg) by oral route once daily   Active     • bimatoprost (Lumigan) 0.01 % ophthalmic drops 1 drop.     • bisoprolol (ZEBeta) 5 MG tablet TAKE ONE-HALF (1/2) TABLET DAILY 90 tablet 3   • Farxiga 10 MG tablet TAKE 1 TABLET DAILY 90 tablet 3   • fluticasone (FLONASE) 50 MCG/ACT nasal spray 2 sprays into the nostril(s) as directed by provider Daily.     • Insulin Pen Needle (BD Pen Needle Juana U/F) 32G X 4 MM misc 1 each Daily. Once in morning 100 each 3   • ipratropium (ATROVENT) 0.06 % nasal spray USE 2 SPRAYS IN EACH NOSTRIL THREE TIMES A DAY AS NEEDED 45 mL 1   • levothyroxine (SYNTHROID, LEVOTHROID) 112 MCG tablet TAKE 1 TABLET EVERY MORNING 90 tablet 0   • meclizine (ANTIVERT) 25 MG tablet Take 1 tablet by mouth 3 (Three) Times a Day As Needed for Dizziness. 30 tablet 0   • PARoxetine (PAXIL) 20 MG tablet TAKE 1 TABLET EVERY MORNING 90 tablet 0   • simvastatin (ZOCOR) 40 MG tablet TAKE 1 TABLET DAILY 90 tablet 1   • Toujeo Max SoloStar 300 UNIT/ML solution  "pen-injector injection Inject 34 Units under the skin into the appropriate area as directed Daily. 9 pen 1   • azithromycin (Zithromax Z-Kvng) 250 MG tablet Take 2 tablets by mouth on day 1, then 1 tablet daily on days 2-5 6 tablet 0     No facility-administered medications prior to visit.       No opioid medication identified on active medication list. I have reviewed chart for other potential  high risk medication/s and harmful drug interactions in the elderly.          Aspirin is on active medication list. Aspirin use is indicated based on review of current medical condition/s. Pros and cons of this therapy have been discussed today. Benefits of this medication outweigh potential harm.  Patient has been encouraged to continue taking this medication.  .      Patient Active Problem List   Diagnosis   • Arthritis   • Diabetes mellitus, type II (HCC)   • Bunion   • Hammertoe   • Hyperlipidemia   • Hypertension   • Hypothyroidism   • Sensorineural hearing loss (SNHL) of both ears   • MANGO on CPAP   • Class 2 obesity     Advance Care Planning  Advance Directive is not on file.  ACP discussion was held with the patient during this visit. Patient has an advance directive (not in EMR), copy requested.     Objective    Vitals:    12/14/22 0722 12/14/22 0755   BP: 142/68 131/64   BP Location: Left arm  Comment: manual Left arm   Patient Position: Sitting Sitting   Cuff Size: Large Adult Adult   Pulse: 75    SpO2: 96%    Weight: 104 kg (229 lb 3.2 oz)    Height: 160 cm (63\")    PainSc: 0-No pain      Estimated body mass index is 40.6 kg/m² as calculated from the following:    Height as of this encounter: 160 cm (63\").    Weight as of this encounter: 104 kg (229 lb 3.2 oz).        Does the patient have evidence of cognitive impairment? No          HEALTH RISK ASSESSMENT    Smoking Status:  Social History     Tobacco Use   Smoking Status Never   Smokeless Tobacco Never     Alcohol Consumption:  Social History     Substance and " Sexual Activity   Alcohol Use Yes    Comment: Rarely     Fall Risk Screen:    [place] Fall Risk Assessment was completed, and patient is at LOW risk for falls.Assessment completed on:12/14/2022    Depression Screening:  PHQ-2/PHQ-9 Depression Screening 12/14/2022   Retired PHQ-9 Total Score -   Retired Total Score -   Little Interest or Pleasure in Doing Things 0-->not at all   Feeling Down, Depressed or Hopeless 0-->not at all   PHQ-9: Brief Depression Severity Measure Score 0       Health Habits and Functional and Cognitive Screening:  Functional & Cognitive Status 12/14/2022   Do you have difficulty preparing food and eating? No   Do you have difficulty bathing yourself, getting dressed or grooming yourself? No   Do you have difficulty using the toilet? No   Do you have difficulty moving around from place to place? Yes   Do you have trouble with steps or getting out of a bed or a chair? No   Current Diet Diabetic Diet   Dental Exam Up to date   Eye Exam Up to date   Exercise (times per week) 7 times per week   Current Exercises Include (No Data)        Exercise Comment pool   Do you need help using the phone?  No   Are you deaf or do you have serious difficulty hearing?  Yes   Do you need help with transportation? No   Do you need help shopping? No   Do you need help preparing meals?  No   Do you need help with housework?  No   Do you need help with laundry? No   Do you need help taking your medications? No   Do you need help managing money? No   Do you ever drive or ride in a car without wearing a seat belt? No   Have you felt unusual stress, anger or loneliness in the last month? No   Who do you live with? Spouse   If you need help, do you have trouble finding someone available to you? No   Have you been bothered in the last four weeks by sexual problems? No   Do you have difficulty concentrating, remembering or making decisions? No       Age-appropriate Screening Schedule:  Refer to the list below for future  screening recommendations based on patient's age, sex and/or medical conditions. Orders for these recommended tests are listed in the plan section. The patient has been provided with a written plan.    Health Maintenance   Topic Date Due   • ZOSTER VACCINE (1 of 2) Never done   • DIABETIC FOOT EXAM  06/17/2021   • URINE MICROALBUMIN  04/01/2022   • HEMOGLOBIN A1C  09/29/2022   • LIPID PANEL  03/29/2023   • DXA SCAN  06/14/2023   • DIABETIC EYE EXAM  08/01/2023   • TDAP/TD VACCINES (3 - Td or Tdap) 01/01/2026   • INFLUENZA VACCINE  Completed                CMS Preventative Services Quick Reference  Risk Factors Identified During Encounter  Immunizations Discussed/Encouraged: Prevnar 20 (Pneumococcal 20-valent conjugate), Shingrix and diabetic foot exam  The above risks/problems have been discussed with the patient.  Pertinent information has been shared with the patient in the After Visit Summary.  An After Visit Summary and PPPS were made available to the patient.    Follow Up:   Next Medicare Wellness visit to be scheduled in 1 year.       Additional E&M Note during same encounter follows:  Patient has multiple medical problems which are significant and separately identifiable that require additional work above and beyond the Medicare Wellness Visit.      Chief Complaint  Medicare Wellness-subsequent    Subjective        HPI  Belem Acosta is also being seen today for a 6-month follow-up and a Medicare annual wellness visit. The patient has verbally consented to [facility]ONICA.    Diabetes- The patient is due for a diabetic foot exam. The patient states she keeps a good check on her feet. She denies any burning or pain in her feet. She does not want a diabetic foot exam today. The patient states that her blood sugar this morning was 153 mmol/L.     Arthritis- The patient states she is in line for a hip replacement. The patient states she went to her orthopedic doctor last month and he told her she was almost there. She  "states she sees Dr. Kemar Garcia for 25 years in [place]. The patient states he did her shoulder and hands and he takes good care of her.     Hypertension- The patient states that her blood pressure increases when she goes to the doctors office. The patient states her blood pressure is well- controlled at home. She notes that her blood pressure at home normally runs around 131/64 mmHg.    Health maintenance - The patient states she does not need any refills at this time. She is not fasting today. The patient states her physical health is about the same as a year ago. She states her mental health is somewhat better. The patient denies being in the hospital in the past year. She takes a baby aspirin daily. The patient states that she has an advanced directive and living will. She denies any cognitive impairment. The patient states she has had both of her shingles vaccines. She states she had the second one this summer. She received them at [facility]. The patient states she has had the second pneumonia vaccine also at [facility]. The patient states she had her last COVID-19 vaccine on 05/03/2022. The patient states that she normally does not have lower extremity swelling, it depends on what she eats.          Objective   Vital Signs:  /64 (BP Location: Left arm, Patient Position: Sitting, Cuff Size: Adult)   Pulse 75   Ht 160 cm (63\")   Wt 104 kg (229 lb 3.2 oz)   SpO2 96%   BMI 40.60 kg/m²     Physical Exam  Vitals reviewed.   Constitutional:       Appearance: Normal appearance. She is well-developed.   Neck:      Thyroid: No thyromegaly.      Vascular: No carotid bruit.   Cardiovascular:      Rate and Rhythm: Normal rate and regular rhythm.      Heart sounds: No murmur heard.    No friction rub. No gallop.   Pulmonary:      Effort: Pulmonary effort is normal.      Breath sounds: Normal breath sounds. No wheezing or rhonchi.   Musculoskeletal:      Right lower leg: No " edema.      Left lower leg: No edema.      Comments: No lower extremity edema present.    Neurological:      Mental Status: She is alert and oriented to person, place, and time.      Cranial Nerves: No cranial nerve deficit.   Psychiatric:         Mood and Affect: Mood and affect normal.         Behavior: Behavior normal.         Thought Content: Thought content normal.         Judgment: Judgment normal.          The following data was reviewed by: BRISSA Melvin on 12/14/2022:  CMP    CMP 3/29/22   Glucose 122 (A)   BUN 13   Creatinine 0.54 (A)   Sodium 141   Potassium 3.8   Chloride 105   Calcium 9.3   Albumin 4.30   Total Bilirubin 0.4   Alkaline Phosphatase 92   AST (SGOT) 17   ALT (SGPT) 17   (A) Abnormal value            CBC    CBC 3/29/22   WBC 9.00   RBC 5.13   Hemoglobin 14.6   Hematocrit 44.7   MCV 87.1   MCH 28.5   MCHC 32.7   RDW 13.6   Platelets 333           CBC w/diff    CBC w/Diff 3/29/22   WBC 9.00   RBC 5.13   Hemoglobin 14.6   Hematocrit 44.7   MCV 87.1   MCH 28.5   MCHC 32.7   RDW 13.6   Platelets 333   Neutrophil Rel % 54.0   Immature Granulocyte Rel % 0.4   Lymphocyte Rel % 36.8   Monocyte Rel % 6.8   Eosinophil Rel % 1.3   Basophil Rel % 0.7           Lipid Panel    Lipid Panel 3/29/22   Total Cholesterol 156   Triglycerides 107   HDL Cholesterol 63 (A)   VLDL Cholesterol 19   LDL Cholesterol  74   LDL/HDL Ratio 1.14   (A) Abnormal value            TSH    TSH 3/29/22   TSH 1.870           Most Recent A1C    HGBA1C Most Recent 3/29/22   Hemoglobin A1C 8.10 (A)   (A) Abnormal value                           Assessment and Plan   Diagnoses and all orders for this visit:    1. Hypothyroidism, unspecified type (Primary)  -     TSH; Future    2. Type 2 diabetes mellitus without complication, without long-term current use of insulin ([facility])  Assessment & Plan:  - The patient's last hemoglobin A1c was 8.10 percent in 03/2022. An order to re-check her A1c has been placed.   -  A referral to  diabetes management has been placed.     Orders:  -     MicroAlbumin, Urine, Random - Urine, Clean Catch; Future  -     Hemoglobin A1c; Future  -     CBC & Differential; Future  -     Vitamin B12; Future  -     Ambulatory Referral to Diabetic Education    3. Hyperlipidemia, unspecified hyperlipidemia type  -     Lipid Panel; Future    4. Primary hypertension  Comments:  - The patient's blood pressure is well- controlled at this time. The patient's blood pressure was re-checked in our office today and it was 131/64 mmHg.   Orders:  -     Comprehensive Metabolic Panel; Future    5. Encounter for hepatitis C screening test for low risk patient  -     Hepatitis C antibody; Future    6. Vitamin D deficiency  -     Vitamin D,25-Hydroxy; Future    7. Healthcare maintenance  Comments:  She is doing well overall. She is up to date on her preventative care. She is due for a diabetic foot exam, which she declines today.        She is due for a COVID-19 booster, which she will consider.  - The patient will complete routine labs when she is fasting.   - The patient will message us with the dates of her shingles and pneumonia vaccine.          Follow Up   No follow-ups on file.  Patient was given instructions and counseling regarding her condition or for health maintenance advice. Please see specific information pulled into the AVS if appropriate.     Transcribed from ambient dictation for BRISSA Melvin by Rachel Boyce.  12/14/22   10:57 EST    Patient or patient representative verbalized consent to the visit recording.  I have personally performed the services described in this document as transcribed by the above individual, and it is both accurate and complete.

## 2022-12-14 NOTE — ASSESSMENT & PLAN NOTE
- The patient's last hemoglobin A1c was 8.10 percent in 03/2022. An order to re-check her A1c has been placed.   -  A referral to diabetes management has been placed.

## 2023-01-04 ENCOUNTER — LAB (OUTPATIENT)
Dept: LAB | Facility: HOSPITAL | Age: 78
End: 2023-01-04
Payer: MEDICARE

## 2023-01-04 DIAGNOSIS — E03.9 HYPOTHYROIDISM, UNSPECIFIED TYPE: ICD-10-CM

## 2023-01-04 DIAGNOSIS — E11.9 TYPE 2 DIABETES MELLITUS WITHOUT COMPLICATION, WITHOUT LONG-TERM CURRENT USE OF INSULIN: ICD-10-CM

## 2023-01-04 DIAGNOSIS — Z11.59 ENCOUNTER FOR HEPATITIS C SCREENING TEST FOR LOW RISK PATIENT: ICD-10-CM

## 2023-01-04 DIAGNOSIS — E55.9 VITAMIN D DEFICIENCY: ICD-10-CM

## 2023-01-04 DIAGNOSIS — E78.5 HYPERLIPIDEMIA, UNSPECIFIED HYPERLIPIDEMIA TYPE: ICD-10-CM

## 2023-01-04 DIAGNOSIS — I10 PRIMARY HYPERTENSION: ICD-10-CM

## 2023-01-04 LAB
25(OH)D3 SERPL-MCNC: 30.8 NG/ML (ref 30–100)
ALBUMIN SERPL-MCNC: 4.1 G/DL (ref 3.5–5.2)
ALBUMIN UR-MCNC: 1.3 MG/DL
ALBUMIN/GLOB SERPL: 1.5 G/DL
ALP SERPL-CCNC: 91 U/L (ref 39–117)
ALT SERPL W P-5'-P-CCNC: 12 U/L (ref 1–33)
ANION GAP SERPL CALCULATED.3IONS-SCNC: 11.1 MMOL/L (ref 5–15)
AST SERPL-CCNC: 14 U/L (ref 1–32)
BASOPHILS # BLD AUTO: 0.05 10*3/MM3 (ref 0–0.2)
BASOPHILS NFR BLD AUTO: 0.6 % (ref 0–1.5)
BILIRUB SERPL-MCNC: 0.4 MG/DL (ref 0–1.2)
BUN SERPL-MCNC: 10 MG/DL (ref 8–23)
BUN/CREAT SERPL: 18.9 (ref 7–25)
CALCIUM SPEC-SCNC: 8.9 MG/DL (ref 8.6–10.5)
CHLORIDE SERPL-SCNC: 105 MMOL/L (ref 98–107)
CHOLEST SERPL-MCNC: 184 MG/DL (ref 0–200)
CO2 SERPL-SCNC: 24.9 MMOL/L (ref 22–29)
CREAT SERPL-MCNC: 0.53 MG/DL (ref 0.57–1)
DEPRECATED RDW RBC AUTO: 43 FL (ref 37–54)
EGFRCR SERPLBLD CKD-EPI 2021: 96.6 ML/MIN/1.73
EOSINOPHIL # BLD AUTO: 0.11 10*3/MM3 (ref 0–0.4)
EOSINOPHIL NFR BLD AUTO: 1.2 % (ref 0.3–6.2)
ERYTHROCYTE [DISTWIDTH] IN BLOOD BY AUTOMATED COUNT: 13.1 % (ref 12.3–15.4)
GLOBULIN UR ELPH-MCNC: 2.8 GM/DL
GLUCOSE SERPL-MCNC: 133 MG/DL (ref 65–99)
HCT VFR BLD AUTO: 43.6 % (ref 34–46.6)
HCV AB SER DONR QL: NORMAL
HDLC SERPL-MCNC: 57 MG/DL (ref 40–60)
HGB BLD-MCNC: 14.3 G/DL (ref 12–15.9)
IMM GRANULOCYTES # BLD AUTO: 0.03 10*3/MM3 (ref 0–0.05)
IMM GRANULOCYTES NFR BLD AUTO: 0.3 % (ref 0–0.5)
LDLC SERPL CALC-MCNC: 100 MG/DL (ref 0–100)
LDLC/HDLC SERPL: 1.67 {RATIO}
LYMPHOCYTES # BLD AUTO: 2.78 10*3/MM3 (ref 0.7–3.1)
LYMPHOCYTES NFR BLD AUTO: 31.3 % (ref 19.6–45.3)
MCH RBC QN AUTO: 29.1 PG (ref 26.6–33)
MCHC RBC AUTO-ENTMCNC: 32.8 G/DL (ref 31.5–35.7)
MCV RBC AUTO: 88.6 FL (ref 79–97)
MONOCYTES # BLD AUTO: 0.65 10*3/MM3 (ref 0.1–0.9)
MONOCYTES NFR BLD AUTO: 7.3 % (ref 5–12)
NEUTROPHILS NFR BLD AUTO: 5.27 10*3/MM3 (ref 1.7–7)
NEUTROPHILS NFR BLD AUTO: 59.3 % (ref 42.7–76)
NRBC BLD AUTO-RTO: 0 /100 WBC (ref 0–0.2)
PLATELET # BLD AUTO: 323 10*3/MM3 (ref 140–450)
PMV BLD AUTO: 8.9 FL (ref 6–12)
POTASSIUM SERPL-SCNC: 4 MMOL/L (ref 3.5–5.2)
PROT SERPL-MCNC: 6.9 G/DL (ref 6–8.5)
RBC # BLD AUTO: 4.92 10*6/MM3 (ref 3.77–5.28)
SODIUM SERPL-SCNC: 141 MMOL/L (ref 136–145)
TRIGL SERPL-MCNC: 159 MG/DL (ref 0–150)
TSH SERPL DL<=0.05 MIU/L-ACNC: 1.78 UIU/ML (ref 0.27–4.2)
VIT B12 BLD-MCNC: 390 PG/ML (ref 211–946)
VLDLC SERPL-MCNC: 27 MG/DL (ref 5–40)
WBC NRBC COR # BLD: 8.89 10*3/MM3 (ref 3.4–10.8)

## 2023-01-04 PROCEDURE — 80053 COMPREHEN METABOLIC PANEL: CPT

## 2023-01-04 PROCEDURE — 82607 VITAMIN B-12: CPT

## 2023-01-04 PROCEDURE — 82043 UR ALBUMIN QUANTITATIVE: CPT

## 2023-01-04 PROCEDURE — 83036 HEMOGLOBIN GLYCOSYLATED A1C: CPT

## 2023-01-04 PROCEDURE — 86803 HEPATITIS C AB TEST: CPT

## 2023-01-04 PROCEDURE — 85025 COMPLETE CBC W/AUTO DIFF WBC: CPT

## 2023-01-04 PROCEDURE — 82306 VITAMIN D 25 HYDROXY: CPT

## 2023-01-04 PROCEDURE — 84443 ASSAY THYROID STIM HORMONE: CPT

## 2023-01-04 PROCEDURE — 80061 LIPID PANEL: CPT

## 2023-01-04 PROCEDURE — 36415 COLL VENOUS BLD VENIPUNCTURE: CPT

## 2023-01-05 ENCOUNTER — TELEPHONE (OUTPATIENT)
Dept: FAMILY MEDICINE CLINIC | Facility: CLINIC | Age: 78
End: 2023-01-05
Payer: MEDICARE

## 2023-01-05 DIAGNOSIS — E11.9 TYPE 2 DIABETES MELLITUS WITHOUT COMPLICATION, WITHOUT LONG-TERM CURRENT USE OF INSULIN: Primary | ICD-10-CM

## 2023-01-05 LAB — HBA1C MFR BLD: 7.4 % (ref 4.8–5.6)

## 2023-01-09 RX ORDER — SIMVASTATIN 40 MG
TABLET ORAL
Qty: 90 TABLET | Refills: 3 | Status: SHIPPED | OUTPATIENT
Start: 2023-01-09

## 2023-01-18 ENCOUNTER — OFFICE VISIT (OUTPATIENT)
Dept: SLEEP MEDICINE | Facility: HOSPITAL | Age: 78
End: 2023-01-18
Payer: MEDICARE

## 2023-01-18 VITALS
DIASTOLIC BLOOD PRESSURE: 61 MMHG | BODY MASS INDEX: 40.15 KG/M2 | HEART RATE: 75 BPM | OXYGEN SATURATION: 95 % | HEIGHT: 63 IN | WEIGHT: 226.6 LBS | SYSTOLIC BLOOD PRESSURE: 161 MMHG

## 2023-01-18 DIAGNOSIS — E66.9 CLASS 2 OBESITY: ICD-10-CM

## 2023-01-18 DIAGNOSIS — Z99.89 OSA ON CPAP: Primary | ICD-10-CM

## 2023-01-18 DIAGNOSIS — G47.33 OSA ON CPAP: Primary | ICD-10-CM

## 2023-01-18 PROCEDURE — G0463 HOSPITAL OUTPT CLINIC VISIT: HCPCS

## 2023-01-18 PROCEDURE — 99213 OFFICE O/P EST LOW 20 MIN: CPT | Performed by: INTERNAL MEDICINE

## 2023-01-18 RX ORDER — LATANOPROST 50 UG/ML
SOLUTION/ DROPS OPHTHALMIC
COMMUNITY
Start: 2022-12-17

## 2023-01-18 NOTE — PROGRESS NOTES
"  [address] [address]  Phone: [phone]  Fax: [phone]      SLEEP CLINIC FOLLOW UP PROGRESS NOTE.    Belem Acosta  [id number]   1945  77 y.o.  female      PCP: Elena Nuñez APRN      Date of visit: 1/18/2023    Chief Complaint   Patient presents with   • Sleep Apnea   • Obesity       HPI:  This is a 77 y.o. years old patient is here for the management of obstructive sleep apnea.  Sleep apnea is severe in severity with a AHI of 110/hr. Patient is using positive airway pressure therapy with auto CPAP and the symptoms of sleep apnea have improved significantly on the therapy. Normally patient goes to bed at 10 PM and wakes up at 7 AM .  The patient wakes up 1-2 time(s) during the night and has no problem going back to sleep.  Feels refreshed after waking up.  She feels well rested when she wakes up and she says that she cannot go without it even for a day.    Medications and allergies are reviewed by me and documented in the encounter.     SOCIAL (habits pertaining to sleep medicine)  • History tobacco use:No   • History of alcohol use: 0 per week  • Caffeine use: 1     REVIEW OF SYSTEMS:   Pertaining positive symptoms are:  • [place] Sleepiness Scale :Total score: 8       PHYSICAL EXAMINATION:  CONSTITUTIONAL:  Vitals:    01/18/23 1300   BP: 161/61   Pulse: 75   SpO2: 95%   Weight: 103 kg (226 lb 9.6 oz)   Height: 160 cm (62.99\")    Body mass index is 40.15 kg/m².   NOSE: nasal passages are clear, No deformities noted   RESP SYSTEM: Not in any respiratory distress, no chest deformities noted,   CARDIOVASULAR: No edema noted  NEURO: Oriented x 3, gait normal,  Mood and affect appeared appropriate      Data reviewed:  The Smart card downloaded on 1/18/2023 has been reviewed independently by me for compliance and discussed the data with the patient.   Compliance; 100%  More than 4 hr use, 100%  Average use of the device 8 hours and 52 " minutes per night  Residual AHI: 0.3 /hr (goal < 5.0 /hr)  Mask type: Fullface  Device: DreamStation 2  DME: [employer]      ASSESSMENT AND PLAN:  · Obstructive sleep apnea ( G 47.33).  The symptoms of sleep apnea have improved with the device and the treatment.  Patient's compliance with the device is excellent for treatment of sleep apnea.  I have independently reviewed the smart card down load and discussed with the patient the download data and encouarged the patient to continue to use the device.The residual AHI is acceptable. The device is benefiting the patient and the device is medically necessary.  Without proper control of sleep apnea and good compliance there is a increased risk for hypertension, diabetes mellitus and nonrestorative sleep with hypersomnia which can increase risk for motor vehicle accidents.  Untreated sleep apnea is also a risk factor for development of atrial fibrillation, pulmonary hypertension, insulin resistance and stroke. The patient is also instructed to get the supplies from the DME company and and change them on a regular basis.  A prescription for supplies has been sent to the [employer] company.  I have also discussed the good sleep hygiene habits and adequate amount of sleep needed for good health.  · Obesity  3 with BMI is Body mass index is 40.15 kg/m².. I have discuss the relationship between the weight and sleep apnea. The benefit of weight loss in reducing severity of sleep apnea was discussed. Discussed diet and exercise with the patient to achieve ideal BMI.   · Return in about 1 year (around 1/18/2024) for with smart card down load. . Patient's questions were answered.    1/18/2023  Janak Leggett MD  Sleep Medicine.  Medical Director,   [facility], [facility] sleep [facility].

## 2023-02-10 DIAGNOSIS — F41.9 ANXIETY: ICD-10-CM

## 2023-02-10 DIAGNOSIS — E03.9 HYPOTHYROIDISM, UNSPECIFIED TYPE: ICD-10-CM

## 2023-02-10 RX ORDER — LEVOTHYROXINE SODIUM 112 UG/1
TABLET ORAL
Qty: 90 TABLET | Refills: 3 | Status: SHIPPED | OUTPATIENT
Start: 2023-02-10

## 2023-02-10 RX ORDER — PAROXETINE HYDROCHLORIDE 20 MG/1
TABLET, FILM COATED ORAL
Qty: 90 TABLET | Refills: 3 | Status: SHIPPED | OUTPATIENT
Start: 2023-02-10

## 2023-03-22 DIAGNOSIS — E11.9 TYPE 2 DIABETES MELLITUS WITHOUT COMPLICATION, WITH LONG-TERM CURRENT USE OF INSULIN: ICD-10-CM

## 2023-03-22 DIAGNOSIS — Z79.4 TYPE 2 DIABETES MELLITUS WITHOUT COMPLICATION, WITH LONG-TERM CURRENT USE OF INSULIN: ICD-10-CM

## 2023-03-22 RX ORDER — INSULIN GLARGINE 300 U/ML
INJECTION, SOLUTION SUBCUTANEOUS
Qty: 24 ML | Refills: 2 | Status: SHIPPED | OUTPATIENT
Start: 2023-03-22

## 2023-03-24 NOTE — PROGRESS NOTES
Chief Complaint  Chief Complaint   Patient presents with   • Establish Care          • Diabetes   • Hypothyroidism   • Anxiety   • Hyperlipidemia   • Hypertension       Subjective          Belem Acosta presents to [facility] to establish care. Pt is former pt of BRISSA Carey. Pt has hx of DM, Hypothyroidism, Anxiety, HL, HTN.    History of Present Illness    DMII: Patient is currently on  Farxiga 10MG and Toujeo 300unit/ML for the treatment of diabetes. Patient is compliant with medications. Patient checks blood sugar at home with average readings of 130. Patient has had a recent eye exam. Patient denies extreme high and low blood sugars. Patient denies any unhealing sores. Patient attempts to monitor carbohydrate/ sugar intake in diet. Followed by AUSTIN Nuñez in Diabetes Care.    Hypothyroidism: Patient is currently on Levothyroxine 112MCG  and doing well. Patient states energy is good. Patient denies weight changes, bowel changes and changes in hair, skin and nails.    Anxiety: Patient is currently on Paroxetine 20MG for anxiety and doing well. Patient states that symptoms are well controlled.    HL: Patient presents for management of hyperlipidemia. Patient is currently on Simvastatin 40MG. Patient denies muscle cramps and muscle weakness. Patient is monitoring diet to help lower lipids.    HTN: Patient presents for hypertension management. Patient is currently taking Bisoprolol 5MG 1/2 tablet daily and is consistent with medication. Patient denies chest pain, shortness of air and edema. Patient does check blood pressure at home with an average reading of 130s.    Colon: 2.23.17  DXA: 6.14.21  MMG: with GYN in [place]    Medical History: has a past medical history of Abnormal bone density screening (03/2019), Ankle pain, Anxiety, Arthritis, Bunion, Corns and callus, Diabetes mellitus, type 2, Hammertoe, Hyperglycemia, Hyperlipidemia, Hypertension, Hypothyroidism,  Impaired fasting glucose (08/19/2014), Panic disorder, Pap smear for cervical cancer screening (2019), Screening mammogram, encounter for (05/2020), Sensorineural hearing loss (SNHL) of both ears, and Sleep apnea (08/26/2015).   Surgical History: has a past surgical history that includes CARPAL TUNNEL INJECTION (2002 2004); Cataract extraction w/  intraocular lens implant (2012); Cholecystectomy (1982); Colonoscopy (02/2017); Rotator cuff repair (06/2013); Thyroidectomy, partial (1997); and Wrist surgery (2008).   Family History: family history includes Diabetes in her mother; Emphysema in her father; Heart disease in her father, maternal grandfather, maternal grandmother, mother, and another family member; Heart failure in her father and mother; Hyperlipidemia in her brother and sister; Hypertension in her mother; Pancreatic cancer in her brother; Stroke in an other family member.   Social History: reports that she has never smoked. She has never used smokeless tobacco. She reports current alcohol use. Drug use questions deferred to the physician.    Allergies: Metformin and Lisinopril    Health Maintenance Due   Topic Date Due   • ZOSTER VACCINE (1 of 2) Never done   • DIABETIC FOOT EXAM  06/17/2021   • COVID-19 Vaccine (5 - Booster) 06/28/2022       Immunization History   Administered Date(s) Administered   • COVID-19 (MODERNA) 1st, 2nd, 3rd Dose Only 01/19/2021, 02/12/2021, 11/05/2021   • COVID-19 (MODERNA) BOOSTER 05/03/2022   • DTaP 12/09/2013   • Fluzone High Dose =>65 Years ([employer] ONLY) 10/21/2022   • Hepatitis A 01/22/2019, 07/23/2019   • Influenza, Unspecified 10/01/2019   • Pneumococcal Polysaccharide (PPSV23) 12/09/2013   • Td 04/05/2006   • Tdap 01/01/2016       Objective     Vitals:    03/27/23 0910 03/27/23 0938   BP: 149/66 138/72   BP Location: Left arm    Patient Position: Sitting    Cuff Size: Large Adult    Pulse: 70    Resp: 20    SpO2: 97%    Weight: 104 kg (228 lb 9.6 oz)    Height: 160 cm  "(62.99\")      Body mass index is 40.51 kg/m².     Wt Readings from Last 3 Encounters:   04/04/23 103 kg (227 lb)   03/27/23 104 kg (228 lb 9.6 oz)   01/18/23 103 kg (226 lb 9.6 oz)     BP Readings from Last 3 Encounters:   04/04/23 173/67   03/27/23 138/72   01/18/23 161/61       Class 3 Severe Obesity (BMI >=40). Obesity-related health conditions include the following: hypertension, diabetes mellitus and dyslipidemias. Obesity is unchanged. BMI is is above average; BMI management plan is completed. We discussed portion control and increasing exercise.      Patient Care Team:  Ellen Burr PA as PCP - General (Family Medicine)  Elena Nuñez APRN as Nurse Practitioner (Nurse Practitioner)    Physical Exam  Vitals and nursing note reviewed.   Constitutional:       Appearance: Normal appearance. She is obese.   HENT:      Head: Normocephalic and atraumatic.   Neck:      Vascular: No carotid bruit.      Comments: Thyroid : gland size normal, nontender, no nodules or masses present on palpation   Cardiovascular:      Rate and Rhythm: Normal rate and regular rhythm.      Pulses: Normal pulses.      Heart sounds: Normal heart sounds.   Pulmonary:      Effort: Pulmonary effort is normal.      Breath sounds: Normal breath sounds.   Musculoskeletal:      Cervical back: Neck supple. No tenderness.      Right lower leg: Edema present.      Left lower leg: Edema present.      Comments: Trace lower extremity edema.   Lymphadenopathy:      Cervical: No cervical adenopathy.   Neurological:      Mental Status: She is alert.   Psychiatric:         Mood and Affect: Mood normal.         Behavior: Behavior normal.           Result Review :       Lab on 01/04/2023   Component Date Value Ref Range Status   • Hepatitis C Ab 01/04/2023 Non-Reactive  Non-Reactive Final   • Vitamin B-12 01/04/2023 390  211 - 946 pg/mL Final   • Total Cholesterol 01/04/2023 184  0 - 200 mg/dL Final   • Triglycerides 01/04/2023 159 (H)  0 " - 150 mg/dL Final   • HDL Cholesterol 01/04/2023 57  40 - 60 mg/dL Final   • LDL Cholesterol  01/04/2023 100  0 - 100 mg/dL Final   • VLDL Cholesterol 01/04/2023 27  5 - 40 mg/dL Final   • LDL/HDL Ratio 01/04/2023 1.67   Final   • TSH 01/04/2023 1.780  0.270 - 4.200 uIU/mL Final   • 25 Hydroxy, Vitamin D 01/04/2023 30.8  30.0 - 100.0 ng/ml Final   • Glucose 01/04/2023 133 (H)  65 - 99 mg/dL Final   • BUN 01/04/2023 10  8 - 23 mg/dL Final   • Creatinine 01/04/2023 0.53 (L)  0.57 - 1.00 mg/dL Final   • Sodium 01/04/2023 141  136 - 145 mmol/L Final   • Potassium 01/04/2023 4.0  3.5 - 5.2 mmol/L Final   • Chloride 01/04/2023 105  98 - 107 mmol/L Final   • CO2 01/04/2023 24.9  22.0 - 29.0 mmol/L Final   • Calcium 01/04/2023 8.9  8.6 - 10.5 mg/dL Final   • Total Protein 01/04/2023 6.9  6.0 - 8.5 g/dL Final   • Albumin 01/04/2023 4.1  3.5 - 5.2 g/dL Final   • ALT (SGPT) 01/04/2023 12  1 - 33 U/L Final   • AST (SGOT) 01/04/2023 14  1 - 32 U/L Final   • Alkaline Phosphatase 01/04/2023 91  39 - 117 U/L Final   • Total Bilirubin 01/04/2023 0.4  0.0 - 1.2 mg/dL Final   • Globulin 01/04/2023 2.8  gm/dL Final   • A/G Ratio 01/04/2023 1.5  g/dL Final   • BUN/Creatinine Ratio 01/04/2023 18.9  7.0 - 25.0 Final   • Anion Gap 01/04/2023 11.1  5.0 - 15.0 mmol/L Final   • eGFR 01/04/2023 96.6  >60.0 mL/min/1.73 Final    National Kidney Foundation and American Society of Nephrology (ASN) Task Force recommended calculation based on the Chronic Kidney Disease Epidemiology Collaboration (CKD-EPI) equation refit without adjustment for race.   • WBC 01/04/2023 8.89  3.40 - 10.80 10*3/mm3 Final   • RBC 01/04/2023 4.92  3.77 - 5.28 10*6/mm3 Final   • Hemoglobin 01/04/2023 14.3  12.0 - 15.9 g/dL Final   • Hematocrit 01/04/2023 43.6  34.0 - 46.6 % Final   • MCV 01/04/2023 88.6  79.0 - 97.0 fL Final   • MCH 01/04/2023 29.1  26.6 - 33.0 pg Final   • MCHC 01/04/2023 32.8  31.5 - 35.7 g/dL Final   • RDW 01/04/2023 13.1  12.3 - 15.4 % Final   • RDW-SD  01/04/2023 43.0  37.0 - 54.0 fl Final   • MPV 01/04/2023 8.9  6.0 - 12.0 fL Final   • Platelets 01/04/2023 323  140 - 450 10*3/mm3 Final   • Neutrophil % 01/04/2023 59.3  42.7 - 76.0 % Final   • Lymphocyte % 01/04/2023 31.3  19.6 - 45.3 % Final   • Monocyte % 01/04/2023 7.3  5.0 - 12.0 % Final   • Eosinophil % 01/04/2023 1.2  0.3 - 6.2 % Final   • Basophil % 01/04/2023 0.6  0.0 - 1.5 % Final   • Immature Grans % 01/04/2023 0.3  0.0 - 0.5 % Final   • Neutrophils, Absolute 01/04/2023 5.27  1.70 - 7.00 10*3/mm3 Final   • Lymphocytes, Absolute 01/04/2023 2.78  0.70 - 3.10 10*3/mm3 Final   • Monocytes, Absolute 01/04/2023 0.65  0.10 - 0.90 10*3/mm3 Final   • Eosinophils, Absolute 01/04/2023 0.11  0.00 - 0.40 10*3/mm3 Final   • Basophils, Absolute 01/04/2023 0.05  0.00 - 0.20 10*3/mm3 Final   • Immature Grans, Absolute 01/04/2023 0.03  0.00 - 0.05 10*3/mm3 Final   • nRBC 01/04/2023 0.0  0.0 - 0.2 /100 WBC Final   • Microalbumin, Urine 01/04/2023 1.3  mg/dL Final   • Hemoglobin A1C 01/04/2023 7.40 (H)  4.80 - 5.60 % Final                         Assessment and Plan      Diagnoses and all orders for this visit:    1. Type 2 diabetes mellitus without complication, with long-term current use of insulin (Primary)  Comments:  Stable on current medications; labs reviewed; continued follow up with Elena in Diabetes Care.    2. Hypothyroidism, unspecified type  Comments:  Stable on Levothyroxine 112mcg daily; continue; no refills needed at present.    3. Class 3 severe obesity due to excess calories with serious comorbidity and body mass index (BMI) of 40.0 to 44.9 in adult    4. Anxiety  Comments:  Stable on Paxil 20mg daily; continue; no refills needed at present.    5. Hyperlipidemia, unspecified hyperlipidemia type  Comments:  Stable on Zocor 40mg daily; no refills needed at present; continue current medication. Labs reviewed.    6. Primary hypertension  Comments:  Stable: continue Bisoprolol 5mg daily; continue; no refills  needed at present.            Follow Up     Return in about 6 months (around 9/27/2023).    Patient was given instructions and counseling regarding her condition or for health maintenance advice. Please see specific information pulled into the AVS if appropriate.

## 2023-03-27 ENCOUNTER — OFFICE VISIT (OUTPATIENT)
Dept: FAMILY MEDICINE CLINIC | Facility: CLINIC | Age: 78
End: 2023-03-27
Payer: MEDICARE

## 2023-03-27 VITALS
HEART RATE: 70 BPM | DIASTOLIC BLOOD PRESSURE: 72 MMHG | BODY MASS INDEX: 40.5 KG/M2 | OXYGEN SATURATION: 97 % | RESPIRATION RATE: 20 BRPM | WEIGHT: 228.6 LBS | HEIGHT: 63 IN | SYSTOLIC BLOOD PRESSURE: 138 MMHG

## 2023-03-27 DIAGNOSIS — E03.9 HYPOTHYROIDISM, UNSPECIFIED TYPE: Chronic | ICD-10-CM

## 2023-03-27 DIAGNOSIS — E78.5 HYPERLIPIDEMIA, UNSPECIFIED HYPERLIPIDEMIA TYPE: Chronic | ICD-10-CM

## 2023-03-27 DIAGNOSIS — E66.01 CLASS 3 SEVERE OBESITY DUE TO EXCESS CALORIES WITH SERIOUS COMORBIDITY AND BODY MASS INDEX (BMI) OF 40.0 TO 44.9 IN ADULT: ICD-10-CM

## 2023-03-27 DIAGNOSIS — I10 PRIMARY HYPERTENSION: Chronic | ICD-10-CM

## 2023-03-27 DIAGNOSIS — Z79.4 TYPE 2 DIABETES MELLITUS WITHOUT COMPLICATION, WITH LONG-TERM CURRENT USE OF INSULIN: Primary | Chronic | ICD-10-CM

## 2023-03-27 DIAGNOSIS — F41.9 ANXIETY: Chronic | ICD-10-CM

## 2023-03-27 DIAGNOSIS — E11.9 TYPE 2 DIABETES MELLITUS WITHOUT COMPLICATION, WITH LONG-TERM CURRENT USE OF INSULIN: Primary | Chronic | ICD-10-CM

## 2023-03-27 PROBLEM — H90.5 SENSORINEURAL HEARING LOSS (SNHL) OF LEFT EAR: Status: ACTIVE | Noted: 2021-09-16

## 2023-03-27 PROCEDURE — 99214 OFFICE O/P EST MOD 30 MIN: CPT | Performed by: PHYSICIAN ASSISTANT

## 2023-03-27 PROCEDURE — 3075F SYST BP GE 130 - 139MM HG: CPT | Performed by: PHYSICIAN ASSISTANT

## 2023-03-27 PROCEDURE — 1160F RVW MEDS BY RX/DR IN RCRD: CPT | Performed by: PHYSICIAN ASSISTANT

## 2023-03-27 PROCEDURE — 1159F MED LIST DOCD IN RCRD: CPT | Performed by: PHYSICIAN ASSISTANT

## 2023-03-27 PROCEDURE — 3051F HG A1C>EQUAL 7.0%<8.0%: CPT | Performed by: PHYSICIAN ASSISTANT

## 2023-03-27 PROCEDURE — 3078F DIAST BP <80 MM HG: CPT | Performed by: PHYSICIAN ASSISTANT

## 2023-03-29 NOTE — PROGRESS NOTES
Chief Complaint  Diabetes (Follow up. Med mgt )    Referred By: Ellen Burr, *    Subjective          Belem Acosta presents to [facility] for diabetes medication management    History of Present Illness    Visit type:  follow-up for diabetes exclusively  Diabetes type:  Type 2  Current diabetes status/concerns/issues:  Reports her only concern is she has not been as good with her diet. She has been traveling. Her and her boyfriend bought a house and she has been cooking more. States she is happy. Her last a1c was 7.4% on 1/4/23. Her last foot exam was 12/22..  Other health concerns: States her arthritis has been bothering her-the wet weather makes it worse. She had a steroid injection back in the fall-she was told she will need a hip replacement soon.  Current Diabetes symptoms:    Polyuria: No   Polydipsia: No   Polyphagia: No   Blurred vision: No   Excessive fatigue: No  Known Diabetes complications:  Neuro: None  Renal: None  Eyes: None She has an eye exam twice yr with  and Dr.Mark Santos.  Amputation/Wounds: None  GI: None  Cardiovascular: HTN, Hyperlipidemia  ED: N/A  Other: None  Hypoglycemia:  None reported at this time  Hypoglycemia Symptoms:  No hypoglycemia at this time  Current diabetes treatment:  Toujeo 34 units qd, Farxiga 10mg qd  Blood glucose device:  Meter  Blood glucose monitoring frequency:  2  Blood glucose range/average:  120-190    Glucose Source: BG Logs  Diet:  Limits high carb/sweet foods, Avoids sugary drinks, Number of meals each day - 3; Number of snacks each day - 1  Activity/Exercise:  walking 3-4 times wk    Past Medical History:   Diagnosis Date   • Abnormal bone density screening 03/2019    Normal   • Ankle pain    • Anxiety    • Arthritis    • Bunion    • Corns and callus    • Diabetes mellitus, type 2    • Hammertoe    • Hyperglycemia    • Hyperlipidemia    • Hypertension    • Hypothyroidism    • Impaired fasting glucose  08/19/2014   • Panic disorder    • Pap smear for cervical cancer screening 2019    NL PER PT SEES DR. PALOMINO IN [place]   • Screening mammogram, encounter for 05/2020    NORMAL DR. PALOMINO [place]   • Sensorineural hearing loss (SNHL) of both ears    • Sleep apnea 08/26/2015     Past Surgical History:   Procedure Laterality Date   • CARPAL TUNNEL INJECTION  2002 2004   • CATARACT EXTRACTION WITH INTRAOCULAR LENS IMPLANT  2012    RIGHT EYE   • CHOLECYSTECTOMY  1982   • COLONOSCOPY  02/2017    POLYPS, TUBULAR ADENOMA   • ROTATOR CUFF REPAIR  06/2013    Left   • THYROIDECTOMY, PARTIAL  1997    Rt thyroid d/t growth-bengin   • WRIST SURGERY  2008     Family History   Problem Relation Age of Onset   • Heart disease Mother    • Heart failure Mother    • Hypertension Mother    • Diabetes Mother    • Emphysema Father    • Heart disease Father    • Heart failure Father    • Hyperlipidemia Sister    • Pancreatic cancer Brother    • Hyperlipidemia Brother    • Heart disease Maternal Grandmother    • Heart disease Maternal Grandfather    • Stroke Other    • Heart disease Other      Social History     Socioeconomic History   • Marital status:    Tobacco Use   • Smoking status: Never   • Smokeless tobacco: Never   Vaping Use   • Vaping Use: Never used   Substance and Sexual Activity   • Alcohol use: Yes     Comment: Rarely   • Drug use: Defer   • Sexual activity: Defer     Allergies   Allergen Reactions   • Metformin Nausea And Vomiting and Unknown - Low Severity   • Lisinopril Cough       Current Outpatient Medications:   •  aspirin 81 MG EC tablet, Aspirin Low Dose 81 mg oral tablet,delayed release (DR/EC) take 1 tablet (81 mg) by oral route once daily   Active, Disp: , Rfl:   •  bisoprolol (ZEBeta) 5 MG tablet, TAKE ONE-HALF (1/2) TABLET DAILY, Disp: 90 tablet, Rfl: 3  •  Farxiga 10 MG tablet, TAKE 1 TABLET DAILY, Disp: 90 tablet, Rfl: 3  •  fluticasone (FLONASE) 50 MCG/ACT nasal spray, 2 sprays into the  "nostril(s) as directed by provider Daily., Disp: , Rfl:   •  Insulin Pen Needle (BD Pen Needle [place] U/F) 32G X 4 MM misc, 1 each Daily. Once in morning, Disp: 100 each, Rfl: 3  •  ipratropium (ATROVENT) 0.06 % nasal spray, USE 2 SPRAYS IN EACH NOSTRIL THREE TIMES A DAY AS NEEDED, Disp: 45 mL, Rfl: 1  •  latanoprost (XALATAN) 0.005 % ophthalmic solution, , Disp: , Rfl:   •  levothyroxine (SYNTHROID, LEVOTHROID) 112 MCG tablet, TAKE 1 TABLET EVERY MORNING, Disp: 90 tablet, Rfl: 3  •  PARoxetine (PAXIL) 20 MG tablet, TAKE 1 TABLET EVERY MORNING, Disp: 90 tablet, Rfl: 3  •  simvastatin (ZOCOR) 40 MG tablet, TAKE 1 TABLET DAILY, Disp: 90 tablet, Rfl: 3  •  Toujeo Max SoloStar 300 UNIT/ML solution pen-injector injection, INJECT 34 UNITS UNDER THE SKIN INTO THE APPROPRIATE AREA DAILY AS DIRECTED, Disp: 24 mL, Rfl: 2        Objective     Vitals:    04/04/23 0824   BP: 173/67   Pulse: 85   Temp: 97 °F (36.1 °C)   SpO2: 94%   Weight: 103 kg (227 lb)   Height: 160 cm (63\")     Body mass index is 40.21 kg/m².    Physical Exam  Constitutional:       Appearance: Normal appearance. She is obese.      Comments: BMI of 40.21 indicates severe obesity with comorbidity of hypertension, hypertriglyceridemia and diabetes   HENT:      Head: Normocephalic and atraumatic.      Right Ear: External ear normal.      Left Ear: External ear normal.      Nose: Nose normal.   Eyes:      Extraocular Movements: Extraocular movements intact.      Conjunctiva/sclera: Conjunctivae normal.   Cardiovascular:      Rate and Rhythm: Normal rate and regular rhythm.      Pulses: Normal pulses.      Heart sounds: Normal heart sounds.   Pulmonary:      Effort: Pulmonary effort is normal.      Breath sounds: Normal breath sounds.   Musculoskeletal:         General: Normal range of motion.      Cervical back: Normal range of motion.   Skin:     General: Skin is warm and dry.   Neurological:      General: No focal deficit present.      Mental Status: She is alert " and oriented to person, place, and time. Mental status is at baseline.   Psychiatric:         Mood and Affect: Mood normal.         Behavior: Behavior normal.         Thought Content: Thought content normal.         Judgment: Judgment normal.     Sensory exam of the foot is normal, tested with the monofilament. Good pulses, no lesions or ulcers, good peripheral pulses.  Diabetic foot exam:   Left: Filament test present   Pulses Dorsalis Pedis:  present  Posterior Tibial:  present   Vibratory sensation normal   Proprioception normal   Sharp/dull discrimination normal       Right: Filament test present   Pulses Dorsalis Pedis:  present  Posterior Tibial:  present   Vibratory sensation normal   Proprioception normal   Sharp/dull discrimination normal      Result Review :   The following data was reviewed by: BRISSA Melvin on 04/04/2023:    Most Recent A1C    HGBA1C Most Recent 1/4/23   Hemoglobin A1C 7.40 (A)   (A) Abnormal value              A1C Last 3 Results    HGBA1C Last 3 Results 1/4/23   Hemoglobin A1C 7.40 (A)   (A) Abnormal value            Her A1c on 1/4/2023 was 7.4 indicating uncontrolled type 2 diabetes.    Glucose   Date Value Ref Range Status   04/04/2023 183 (H) 70 - 99 mg/dL Final     Comment:     Serial Number: [id number]:  [id number]     Nonfasting glucose elevated in the office today.    Creatinine   Date Value Ref Range Status   01/04/2023 0.53 (L) 0.57 - 1.00 mg/dL Final   03/29/2022 0.54 (L) 0.57 - 1.00 mg/dL Final     eGFR   Date Value Ref Range Status   01/04/2023 96.6 >60.0 mL/min/1.73 Final     Comment:     National Kidney Foundation and American Society of Nephrology (ASN) Task Force recommended calculation based on the Chronic Kidney Disease Epidemiology Collaboration (CKD-EPI) equation refit without adjustment for race.   03/29/2022 96.7 >60.0 mL/min/1.73 Final     Comment:     National Kidney Foundation and American Society of Nephrology (ASN) Task Force recommended  calculation based on the Chronic Kidney Disease Epidemiology Collaboration (CKD-EPI) equation refit without adjustment for race.     Reviewed renal function from 1/4/2023 indicating normal renal function.    Microalbumin, Urine   Date Value Ref Range Status   01/04/2023 1.3 mg/dL Final     No results found for: CREATININEUR  Microalbumin/Creatinine Ratio   Date Value Ref Range Status   04/01/2021 12.8 0.0 - 35.0 mg/g[Cre] Final   02/21/2020 13.2 0.0 - 35.0 mg/g[Cre] Final     Reviewed urine micro from 1/4/2023 indicating normal urine micro.    Total Cholesterol   Date Value Ref Range Status   01/04/2023 184 0 - 200 mg/dL Final   03/29/2022 156 0 - 200 mg/dL Final     Triglycerides   Date Value Ref Range Status   01/04/2023 159 (H) 0 - 150 mg/dL Final   03/29/2022 107 0 - 150 mg/dL Final     HDL Cholesterol   Date Value Ref Range Status   01/04/2023 57 40 - 60 mg/dL Final   03/29/2022 63 (H) 40 - 60 mg/dL Final     LDL Cholesterol    Date Value Ref Range Status   01/04/2023 100 0 - 100 mg/dL Final   03/29/2022 74 0 - 100 mg/dL Final     Reviewed lipid panel from 1/4/2023 indicating hypertriglyceridemia.            Assessment: Her A1c from January indicates uncontrolled type 2 diabetes.  She admits she has not been as compliant as normal with her diet.  States she has been on multiple vacations and her diet has not been as well as it should be.  She does state that she just moved into a new house and she plans on working on her diet.  She has been more active outdoors walking 3 to 4 days a week. Unfortunately we were unable to check her a1c today in the office due to lack of testing supplies.       Diagnoses and all orders for this visit:    1. Uncontrolled type 2 diabetes mellitus with hyperglycemia (Primary)    2. Severe obesity (BMI >= 40)    Other orders  -     POC Glucose        Plan: Increased her Toujeo to 37 units once day. Instructed pt to increase her Toujeo by 3 units every 3 days until her fbs is  consistently less than 150 then she can stay at that dose. No other changes were made at today's visit. Scheduled a 6 month follow up. Instructed to work on diet and continue exercise as tolerated. We will check an a1c at her next office visit in 6 months.     The patient will monitor her blood glucose levels twice daily.  If she develops problematic hyperglycemia or hypoglycemia or adverse drug reactions, she will contact the office for further instructions.        Follow Up     Return in about 3 months (around 7/4/2023) for Medication Mgmt.    Patient was given instructions and counseling regarding her condition or for health maintenance advice. Please see specific information pulled into the AVS if appropriate.     Elena Nuñez, APRN  04/04/2023      Dictated Utilizing Dragon Dictation.  Please note that portions of this note were completed with a voice recognition program.  Part of this note may be an electronic transcription/translation of spoken language to printed text using the Dragon Dictation System.

## 2023-04-04 ENCOUNTER — OFFICE VISIT (OUTPATIENT)
Dept: DIABETES SERVICES | Facility: HOSPITAL | Age: 78
End: 2023-04-04
Payer: MEDICARE

## 2023-04-04 VITALS
BODY MASS INDEX: 40.22 KG/M2 | HEART RATE: 85 BPM | TEMPERATURE: 97 F | SYSTOLIC BLOOD PRESSURE: 173 MMHG | DIASTOLIC BLOOD PRESSURE: 67 MMHG | OXYGEN SATURATION: 94 % | WEIGHT: 227 LBS | HEIGHT: 63 IN

## 2023-04-04 DIAGNOSIS — E11.65 UNCONTROLLED TYPE 2 DIABETES MELLITUS WITH HYPERGLYCEMIA: Primary | ICD-10-CM

## 2023-04-04 DIAGNOSIS — E66.01 SEVERE OBESITY (BMI >= 40): ICD-10-CM

## 2023-04-04 LAB — GLUCOSE BLDC GLUCOMTR-MCNC: 183 MG/DL (ref 70–99)

## 2023-04-04 PROCEDURE — 1159F MED LIST DOCD IN RCRD: CPT | Performed by: NURSE PRACTITIONER

## 2023-04-04 PROCEDURE — 3077F SYST BP >= 140 MM HG: CPT | Performed by: NURSE PRACTITIONER

## 2023-04-04 PROCEDURE — 99213 OFFICE O/P EST LOW 20 MIN: CPT | Performed by: NURSE PRACTITIONER

## 2023-04-04 PROCEDURE — 82962 GLUCOSE BLOOD TEST: CPT | Performed by: NURSE PRACTITIONER

## 2023-04-04 PROCEDURE — G0463 HOSPITAL OUTPT CLINIC VISIT: HCPCS | Performed by: NURSE PRACTITIONER

## 2023-04-04 PROCEDURE — 1160F RVW MEDS BY RX/DR IN RCRD: CPT | Performed by: NURSE PRACTITIONER

## 2023-04-04 PROCEDURE — 3078F DIAST BP <80 MM HG: CPT | Performed by: NURSE PRACTITIONER

## 2023-04-04 NOTE — PATIENT INSTRUCTIONS
Increase your Toujeo 37 units once day. You can increase your Toujeo by 3 units every 3 days until your fasting blood sugar stays less than 150 then stay at that dose.

## 2023-05-15 RX ORDER — IPRATROPIUM BROMIDE 42 UG/1
SPRAY, METERED NASAL
Qty: 45 ML | Refills: 7 | Status: SHIPPED | OUTPATIENT
Start: 2023-05-15

## 2023-05-24 ENCOUNTER — TELEPHONE (OUTPATIENT)
Dept: FAMILY MEDICINE CLINIC | Facility: CLINIC | Age: 78
End: 2023-05-24
Payer: MEDICARE

## 2023-05-24 NOTE — TELEPHONE ENCOUNTER
Caller: Belem Acosta    Relationship: Self    Best call back number: [phone]    What orders are you requesting (i.e. lab or imaging): MRI    In what timeframe would the patient need to come in: AS SOON AS POSSIBLE    Where will you receive your lab/imaging services: [facility]    Additional notes: PATIENT IS STATING THAT SHE WAS TOLD BY PHYSICIAN AT [facility] THAT SHE NEEDED TO CONTACT PRIMARY CARE PHYSICIAN TO GET AN MRI FOR HER BACK. PATIENT STATES THAT SHE HAS A HISTORY WITH BACK PAIN. PATIENT STATES THAT IT'S CURRENTLY IN HER LOWER BACK. PATIENT STATES THAT PAIN USUALLY RADIATES THROUGH ALL PORTIONS OF HER BACK. PATIENT STATES THAT SHE WENT TO ORTHOPEDIC IN [place] YEARS AGO. PATIENT WOULD ALSO LIKE TO DISCUSS HAVING AN ORTHOPEDIC REFERRAL PUT IN AFTER MRI RESULTS.

## 2023-05-30 ENCOUNTER — OFFICE VISIT (OUTPATIENT)
Dept: FAMILY MEDICINE CLINIC | Facility: CLINIC | Age: 78
End: 2023-05-30

## 2023-05-30 VITALS
HEART RATE: 79 BPM | BODY MASS INDEX: 47.63 KG/M2 | DIASTOLIC BLOOD PRESSURE: 64 MMHG | SYSTOLIC BLOOD PRESSURE: 156 MMHG | OXYGEN SATURATION: 98 % | HEIGHT: 63 IN | WEIGHT: 268.8 LBS

## 2023-05-30 DIAGNOSIS — M54.16 LUMBAR BACK PAIN WITH RADICULOPATHY AFFECTING LOWER EXTREMITY: Primary | ICD-10-CM

## 2023-05-30 DIAGNOSIS — Z92.89 HISTORY OF MRI OF LUMBAR SPINE: ICD-10-CM

## 2023-05-30 DIAGNOSIS — M51.26 LUMBAR HERNIATED DISC: ICD-10-CM

## 2023-05-30 NOTE — PROGRESS NOTES
Chief Complaint  Back Pain and Leg Pain    SUBJECTIVE  Belem Acosta presents to [facility]    History of Present Illness  Patient is a 77-year-old female who presents today with complaints of lower back pain that radiates down the back of both legs.  Patient reports she has had trouble with this on and off for a long time but over the past couple of months it is gotten worse.Pain at today's visit is 8/10. Patient reports prolonged sitting or prolonged exercise or exertion causes the pain to worsen.   Patient reports she usually uses ice and heat and alternates that with ibuprofen and Tylenol that alleviates this pain but it is not helping this time. Patient reports she has had a MRI 20 years ago that was abnormal. Patient has spoken with pain management and they are happy to see her once she has a repeat MRI done. Patient declines any medications or injections at this time.     No other complaints or concerns at today's visit.     Past Medical History:   Diagnosis Date   • Abnormal bone density screening 03/2019    Normal   • Ankle pain    • Anxiety    • Arthritis    • Bunion    • Corns and callus    • Diabetes mellitus, type 2    • Hammertoe    • Hyperglycemia    • Hyperlipidemia    • Hypertension    • Hypothyroidism    • Impaired fasting glucose 08/19/2014   • Panic disorder    • Pap smear for cervical cancer screening 2019    NL PER PT SEES DR. GEORGETTE WATSON   • Screening mammogram, encounter for 05/2020    NORMAL DR. GRANESE IN [place]   • Sensorineural hearing loss (SNHL) of both ears    • Sleep apnea 08/26/2015      Family History   Problem Relation Age of Onset   • Heart disease Mother    • Heart failure Mother    • Hypertension Mother    • Diabetes Mother    • Emphysema Father    • Heart disease Father    • Heart failure Father    • Hyperlipidemia Sister    • Pancreatic cancer Brother    • Hyperlipidemia Brother    • Heart disease Maternal Grandmother   "  • Heart disease Maternal Grandfather    • Stroke Other    • Heart disease Other       Past Surgical History:   Procedure Laterality Date   • CARPAL TUNNEL INJECTION  2002 2004   • CATARACT EXTRACTION WITH INTRAOCULAR LENS IMPLANT  2012    RIGHT EYE   • CHOLECYSTECTOMY  1982   • COLONOSCOPY  02/2017    POLYPS, TUBULAR ADENOMA   • ROTATOR CUFF REPAIR  06/2013    Left   • THYROIDECTOMY, PARTIAL  1997    Rt thyroid d/t growth-bengin   • WRIST SURGERY  2008        Current Outpatient Medications:   •  aspirin 81 MG EC tablet, Aspirin Low Dose 81 mg oral tablet,delayed release (DR/EC) take 1 tablet (81 mg) by oral route once daily   Active, Disp: , Rfl:   •  bisoprolol (ZEBeta) 5 MG tablet, TAKE ONE-HALF (1/2) TABLET DAILY, Disp: 90 tablet, Rfl: 3  •  Farxiga 10 MG tablet, TAKE 1 TABLET DAILY, Disp: 90 tablet, Rfl: 3  •  fluticasone (FLONASE) 50 MCG/ACT nasal spray, 2 sprays into the nostril(s) as directed by provider Daily., Disp: , Rfl:   •  Insulin Pen Needle (BD Pen Needle Juana U/F) 32G X 4 MM misc, 1 each Daily. Once in morning, Disp: 100 each, Rfl: 3  •  ipratropium (ATROVENT) 0.06 % nasal spray, USE 2 SPRAYS IN EACH NOSTRIL THREE TIMES A DAY AS NEEDED, Disp: 45 mL, Rfl: 7  •  latanoprost (XALATAN) 0.005 % ophthalmic solution, , Disp: , Rfl:   •  levothyroxine (SYNTHROID, LEVOTHROID) 112 MCG tablet, TAKE 1 TABLET EVERY MORNING, Disp: 90 tablet, Rfl: 3  •  PARoxetine (PAXIL) 20 MG tablet, TAKE 1 TABLET EVERY MORNING, Disp: 90 tablet, Rfl: 3  •  simvastatin (ZOCOR) 40 MG tablet, TAKE 1 TABLET DAILY, Disp: 90 tablet, Rfl: 3  •  Toujeo Max SoloStar 300 UNIT/ML solution pen-injector injection, INJECT 34 UNITS UNDER THE SKIN INTO THE APPROPRIATE AREA DAILY AS DIRECTED, Disp: 24 mL, Rfl: 2    OBJECTIVE  Vital Signs:   /64 (BP Location: Left arm, Patient Position: Sitting, Cuff Size: Large Adult)   Pulse 79   Ht 160 cm (63\")   Wt 122 kg (268 lb 12.8 oz)   SpO2 98%   BMI 47.62 kg/m²    Estimated body mass index " "is 47.62 kg/m² as calculated from the following:    Height as of this encounter: 160 cm (63\").    Weight as of this encounter: 122 kg (268 lb 12.8 oz).     Wt Readings from Last 3 Encounters:   05/30/23 122 kg (268 lb 12.8 oz)   04/04/23 103 kg (227 lb)   03/27/23 104 kg (228 lb 9.6 oz)     BP Readings from Last 3 Encounters:   05/30/23 156/64   04/04/23 173/67   03/27/23 138/72       Physical Exam  Constitutional:       Appearance: She is morbidly obese.   HENT:      Head: Normocephalic and atraumatic.   Eyes:      Conjunctiva/sclera: Conjunctivae normal.   Cardiovascular:      Rate and Rhythm: Normal rate and regular rhythm.      Heart sounds: Normal heart sounds.   Pulmonary:      Effort: Pulmonary effort is normal.      Breath sounds: Normal breath sounds.   Musculoskeletal:      Cervical back: Normal, normal range of motion and neck supple.      Thoracic back: Normal.      Lumbar back: Tenderness present.   Skin:     General: Skin is warm and dry.   Neurological:      General: No focal deficit present.      Mental Status: She is alert and oriented to person, place, and time.   Psychiatric:         Mood and Affect: Mood normal.         Behavior: Behavior normal.         Thought Content: Thought content normal.         Judgment: Judgment normal.          Result Review        No Images in the past 120 days found..      The above data has been reviewed by BRISSA Ruvalcaba 05/30/2023 10:02 EDT.          Patient Care Team:  Ellen Burr PA as PCP - General (Family Medicine)  Elena Nuñez APRN as Nurse Practitioner (Nurse Practitioner)           ASSESSMENT & PLAN    Diagnoses and all orders for this visit:    1. Lumbar back pain with radiculopathy affecting lower extremity (Primary)  Comments:  Recommended to alternate OTC ibuprofen and Tylenol as need for pain, heat, ice, and massage.   Orders:  -     MRI Lumbar Spine With & Without Contrast; Future    2. Lumbar herniated disc  -     MRI " Lumbar Spine With & Without Contrast; Future    3. History of MRI of lumbar spine  -     MRI Lumbar Spine With & Without Contrast; Future         Tobacco Use: Low Risk    • Smoking Tobacco Use: Never   • Smokeless Tobacco Use: Never   • Passive Exposure: Not on file       Follow Up     Return if symptoms worsen or fail to improve.      Patient was given instructions and counseling regarding her condition or for health maintenance advice. Please see specific information pulled into the AVS if appropriate.   I have reviewed information obtained and documented by others and I have confirmed the accuracy of this documented note.    Oneyda Taylor, APRN

## 2023-06-07 ENCOUNTER — TELEPHONE (OUTPATIENT)
Dept: FAMILY MEDICINE CLINIC | Facility: CLINIC | Age: 78
End: 2023-06-07
Payer: MEDICARE

## 2023-06-07 DIAGNOSIS — M54.16 LUMBAR BACK PAIN WITH RADICULOPATHY AFFECTING LOWER EXTREMITY: Primary | ICD-10-CM

## 2023-06-07 NOTE — TELEPHONE ENCOUNTER
Caller: Belem Acosta    Relationship: Self    Best call back number: [phone] (Mobile)     What form or medical record are you requesting: OFFICE NOTES FROM VISIT ON 05.30.2023    Who is requesting this form or medical record from you: DR. OLMEDO    How would you like to receive the form or medical records (pick-up, mail, fax): FAX  If fax, what is the fax number: [phone] ATN DR. OLMEDO    Timeframe paperwork needed: AS SOON AS POSSIBLE

## 2023-06-09 ENCOUNTER — TELEPHONE (OUTPATIENT)
Dept: FAMILY MEDICINE CLINIC | Facility: CLINIC | Age: 78
End: 2023-06-09
Payer: MEDICARE

## 2023-06-09 DIAGNOSIS — M54.16 LUMBAR BACK PAIN WITH RADICULOPATHY AFFECTING LOWER EXTREMITY: Primary | ICD-10-CM

## 2023-08-24 ENCOUNTER — OFFICE VISIT (OUTPATIENT)
Dept: NEUROSURGERY | Facility: CLINIC | Age: 78
End: 2023-08-24
Payer: MEDICARE

## 2023-08-24 ENCOUNTER — PATIENT ROUNDING (BHMG ONLY) (OUTPATIENT)
Dept: NEUROLOGY | Facility: CLINIC | Age: 78
End: 2023-08-24
Payer: MEDICARE

## 2023-08-24 ENCOUNTER — TELEPHONE (OUTPATIENT)
Dept: NEUROSURGERY | Facility: CLINIC | Age: 78
End: 2023-08-24

## 2023-08-24 VITALS
WEIGHT: 215.6 LBS | DIASTOLIC BLOOD PRESSURE: 84 MMHG | HEIGHT: 63 IN | BODY MASS INDEX: 38.2 KG/M2 | SYSTOLIC BLOOD PRESSURE: 143 MMHG

## 2023-08-24 DIAGNOSIS — M48.062 SPINAL STENOSIS OF LUMBAR REGION WITH NEUROGENIC CLAUDICATION: Primary | ICD-10-CM

## 2023-08-24 RX ORDER — MELOXICAM 15 MG/1
15 TABLET ORAL DAILY
COMMUNITY
Start: 2023-08-09 | End: 2023-08-24

## 2023-08-24 NOTE — PROGRESS NOTES
"Chief Complaint  Back Pain    Subjective          Belem Acosta who is a 78 y.o. year old female who presents to [facility] for Evaluation of the Spine.     The patient complains of pain located in the lumbar spine.  Patients states the pain has been present for several years, worse since April.  The pain came on gradually.  The pain scale level is 8.  The pain  radiates into the bilateral hamstring area .  The pain is waxing/waning and described as sharp.  The pain is worse at no particular time of day. Patient states prolonged standing, prolonged sitting, and prolonged walking makes the pain worse.  Patient states  LESB and NSAIDs and ice packs  makes the pain better. She also likes a certain position in her recliner. The leg pain is worse than the lower back pain. The leg pain is now about equal.    Associated Symptoms Include: Denies numbness and tingling  Conservative Interventions Include:  LESB, injections, no recent PT    Was this the result of an injury or accident? : No    History of Previous Spinal Surgery?: No    This patient  reports that she has never smoked. She has never used smokeless tobacco.    Review of Systems   Musculoskeletal:  Positive for back pain and myalgias.      Objective   Vital Signs:   /84 (BP Location: Right arm, Patient Position: Sitting)   Ht 160 cm (63\")   Wt 97.8 kg (215 lb 9.6 oz)   BMI 38.19 kg/mý       Physical Exam  Constitutional:       Appearance: She is obese.   Cardiovascular:      Comments: No notable edema    Pulmonary:      Effort: Pulmonary effort is normal.   Skin:     General: Skin is warm and dry.   Neurological:      Mental Status: She is alert.      Sensory: No sensory deficit.      Motor: No weakness.      Deep Tendon Reflexes: Reflexes normal.   Psychiatric:         Mood and Affect: Mood normal.        Result Review :       I personally reviewed the patient's MRI scan which shows notable L3-4 stenosis " (moderate to severe).     Assessment and Plan    Diagnoses and all orders for this visit:    1. Spinal stenosis of lumbar region with neurogenic claudication (Primary)      Her additional options are PT vs left approach for lumbar 3-lumbar 4 minimally invasive laminectomy. She is leaning towards surgery.    She will call with her decision about how she would like to proceed.    Follow Up   Return if symptoms worsen or fail to improve.  Patient was given instructions and counseling regarding her condition or for health maintenance advice. Please see specific information pulled into the AVS if appropriate.       Addendum: Pt called back and desires to proceed with surgery. We discussed the risks and benefits of surgery.

## 2023-08-24 NOTE — TELEPHONE ENCOUNTER
Patient called the office and has decided to proceed with surgery. Could you place orders and route back to me for scheduling?

## 2023-08-24 NOTE — H&P (VIEW-ONLY)
"Chief Complaint  Back Pain    Subjective          Belem Acosta who is a 78 y.o. year old female who presents to [facility] for Evaluation of the Spine.     The patient complains of pain located in the lumbar spine.  Patients states the pain has been present for several years, worse since April.  The pain came on gradually.  The pain scale level is 8.  The pain  radiates into the bilateral hamstring area .  The pain is waxing/waning and described as sharp.  The pain is worse at no particular time of day. Patient states prolonged standing, prolonged sitting, and prolonged walking makes the pain worse.  Patient states  LESB and NSAIDs and ice packs  makes the pain better. She also likes a certain position in her recliner. The leg pain is worse than the lower back pain. The leg pain is now about equal.    Associated Symptoms Include: Denies numbness and tingling  Conservative Interventions Include:  LESB, injections, no recent PT    Was this the result of an injury or accident? : No    History of Previous Spinal Surgery?: No    This patient  reports that she has never smoked. She has never used smokeless tobacco.    Review of Systems   Musculoskeletal:  Positive for back pain and myalgias.      Objective   Vital Signs:   /84 (BP Location: Right arm, Patient Position: Sitting)   Ht 160 cm (63\")   Wt 97.8 kg (215 lb 9.6 oz)   BMI 38.19 kg/m²       Physical Exam  Constitutional:       Appearance: She is obese.   Cardiovascular:      Comments: No notable edema    Pulmonary:      Effort: Pulmonary effort is normal.   Skin:     General: Skin is warm and dry.   Neurological:      Mental Status: She is alert.      Sensory: No sensory deficit.      Motor: No weakness.      Deep Tendon Reflexes: Reflexes normal.   Psychiatric:         Mood and Affect: Mood normal.        Result Review :       I personally reviewed the patient's MRI scan which shows notable L3-4 stenosis " (moderate to severe).     Assessment and Plan    Diagnoses and all orders for this visit:    1. Spinal stenosis of lumbar region with neurogenic claudication (Primary)      Her additional options are PT vs left approach for lumbar 3-lumbar 4 minimally invasive laminectomy. She is leaning towards surgery.    She will call with her decision about how she would like to proceed.    Follow Up   Return if symptoms worsen or fail to improve.  Patient was given instructions and counseling regarding her condition or for health maintenance advice. Please see specific information pulled into the AVS if appropriate.       Addendum: Pt called back and desires to proceed with surgery. We discussed the risks and benefits of surgery.

## 2023-08-25 PROBLEM — M48.062 SPINAL STENOSIS OF LUMBAR REGION WITH NEUROGENIC CLAUDICATION: Status: ACTIVE | Noted: 2023-08-25

## 2023-08-25 NOTE — TELEPHONE ENCOUNTER
Spoke with patient. Surgery scheduled for 9-20-23. Instructions sent to patient via [facility] [employer].

## 2023-09-18 NOTE — PRE-PROCEDURE INSTRUCTIONS
Patient instructed to have no food past midnight, clears up to 2 hours prior to arrival time. Patient instructed to wear no lotions, jewelry or piercing's day of surgery.  Patient to shower with surgical soap am of surgery.  Patient to take Bisoprolol, levothyroxine and 1/2 dose of Toujeo

## 2023-09-20 ENCOUNTER — APPOINTMENT (OUTPATIENT)
Dept: GENERAL RADIOLOGY | Facility: HOSPITAL | Age: 78
End: 2023-09-20
Payer: MEDICARE

## 2023-09-20 ENCOUNTER — ANESTHESIA EVENT (OUTPATIENT)
Dept: PERIOP | Facility: HOSPITAL | Age: 78
End: 2023-09-20
Payer: MEDICARE

## 2023-09-20 ENCOUNTER — ANESTHESIA (OUTPATIENT)
Dept: PERIOP | Facility: HOSPITAL | Age: 78
End: 2023-09-20
Payer: MEDICARE

## 2023-09-20 ENCOUNTER — HOSPITAL ENCOUNTER (OUTPATIENT)
Facility: HOSPITAL | Age: 78
Setting detail: HOSPITAL OUTPATIENT SURGERY
Discharge: HOME | End: 2023-09-20
Attending: NEUROLOGICAL SURGERY | Admitting: NEUROLOGICAL SURGERY
Payer: MEDICARE

## 2023-09-20 VITALS
HEIGHT: 63 IN | BODY MASS INDEX: 38.28 KG/M2 | OXYGEN SATURATION: 94 % | TEMPERATURE: 97.6 F | SYSTOLIC BLOOD PRESSURE: 130 MMHG | RESPIRATION RATE: 16 BRPM | HEART RATE: 66 BPM | DIASTOLIC BLOOD PRESSURE: 57 MMHG | WEIGHT: 216.05 LBS

## 2023-09-20 DIAGNOSIS — M48.062 SPINAL STENOSIS OF LUMBAR REGION WITH NEUROGENIC CLAUDICATION: ICD-10-CM

## 2023-09-20 LAB
ANION GAP SERPL CALCULATED.3IONS-SCNC: 13.2 MMOL/L (ref 5–15)
BUN SERPL-MCNC: 12 MG/DL (ref 8–23)
BUN/CREAT SERPL: 22.6 (ref 7–25)
CALCIUM SPEC-SCNC: 9.6 MG/DL (ref 8.6–10.5)
CHLORIDE SERPL-SCNC: 103 MMOL/L (ref 98–107)
CO2 SERPL-SCNC: 25.8 MMOL/L (ref 22–29)
CREAT SERPL-MCNC: 0.53 MG/DL (ref 0.57–1)
EGFRCR SERPLBLD CKD-EPI 2021: 96 ML/MIN/1.73
GLUCOSE BLDC GLUCOMTR-MCNC: 151 MG/DL (ref 70–99)
GLUCOSE SERPL-MCNC: 131 MG/DL (ref 65–99)
POTASSIUM SERPL-SCNC: 3.7 MMOL/L (ref 3.5–5.2)
QT INTERVAL: 388 MS
QTC INTERVAL: 464 MS
SODIUM SERPL-SCNC: 142 MMOL/L (ref 136–145)

## 2023-09-20 PROCEDURE — 76000 FLUOROSCOPY <1 HR PHYS/QHP: CPT

## 2023-09-20 PROCEDURE — 25010000002 ONDANSETRON PER 1 MG: Performed by: NURSE ANESTHETIST, CERTIFIED REGISTERED

## 2023-09-20 PROCEDURE — 25010000002 SUGAMMADEX 200 MG/2ML SOLUTION: Performed by: NURSE ANESTHETIST, CERTIFIED REGISTERED

## 2023-09-20 PROCEDURE — 25010000002 MIDAZOLAM PER 1MG: Performed by: ANESTHESIOLOGY

## 2023-09-20 PROCEDURE — 25010000002 FENTANYL CITRATE (PF) 50 MCG/ML SOLUTION: Performed by: NURSE ANESTHETIST, CERTIFIED REGISTERED

## 2023-09-20 PROCEDURE — 80048 BASIC METABOLIC PNL TOTAL CA: CPT | Performed by: ANESTHESIOLOGY

## 2023-09-20 PROCEDURE — 25010000002 PROPOFOL 10 MG/ML EMULSION: Performed by: NURSE ANESTHETIST, CERTIFIED REGISTERED

## 2023-09-20 PROCEDURE — 82948 REAGENT STRIP/BLOOD GLUCOSE: CPT

## 2023-09-20 PROCEDURE — 25010000002 METOCLOPRAMIDE PER 10 MG: Performed by: ANESTHESIOLOGY

## 2023-09-20 PROCEDURE — 93005 ELECTROCARDIOGRAM TRACING: CPT | Performed by: ANESTHESIOLOGY

## 2023-09-20 PROCEDURE — 25010000002 DEXAMETHASONE PER 1 MG: Performed by: NURSE ANESTHETIST, CERTIFIED REGISTERED

## 2023-09-20 PROCEDURE — 25010000002 CEFAZOLIN IN DEXTROSE 2000 MG/ 100 ML SOLUTION: Performed by: NEUROLOGICAL SURGERY

## 2023-09-20 RX ORDER — PROMETHAZINE HYDROCHLORIDE 25 MG/1
25 SUPPOSITORY RECTAL ONCE AS NEEDED
Status: DISCONTINUED | OUTPATIENT
Start: 2023-09-20 | End: 2023-09-20 | Stop reason: HOSPADM

## 2023-09-20 RX ORDER — PROMETHAZINE HYDROCHLORIDE 12.5 MG/1
25 TABLET ORAL ONCE AS NEEDED
Status: DISCONTINUED | OUTPATIENT
Start: 2023-09-20 | End: 2023-09-20 | Stop reason: HOSPADM

## 2023-09-20 RX ORDER — CEFAZOLIN SODIUM 2 G/100ML
2 INJECTION, SOLUTION INTRAVENOUS ONCE
Status: COMPLETED | OUTPATIENT
Start: 2023-09-20 | End: 2023-09-20

## 2023-09-20 RX ORDER — METOCLOPRAMIDE HYDROCHLORIDE 5 MG/ML
10 INJECTION INTRAMUSCULAR; INTRAVENOUS ONCE AS NEEDED
Status: COMPLETED | OUTPATIENT
Start: 2023-09-20 | End: 2023-09-20

## 2023-09-20 RX ORDER — ONDANSETRON 2 MG/ML
INJECTION INTRAMUSCULAR; INTRAVENOUS AS NEEDED
Status: DISCONTINUED | OUTPATIENT
Start: 2023-09-20 | End: 2023-09-20 | Stop reason: SURG

## 2023-09-20 RX ORDER — DEXMEDETOMIDINE HYDROCHLORIDE 100 UG/ML
INJECTION, SOLUTION INTRAVENOUS AS NEEDED
Status: DISCONTINUED | OUTPATIENT
Start: 2023-09-20 | End: 2023-09-20 | Stop reason: SURG

## 2023-09-20 RX ORDER — OXYCODONE HYDROCHLORIDE 5 MG/1
5 TABLET ORAL
Status: DISCONTINUED | OUTPATIENT
Start: 2023-09-20 | End: 2023-09-20 | Stop reason: HOSPADM

## 2023-09-20 RX ORDER — LIDOCAINE HYDROCHLORIDE 20 MG/ML
INJECTION, SOLUTION EPIDURAL; INFILTRATION; INTRACAUDAL; PERINEURAL AS NEEDED
Status: DISCONTINUED | OUTPATIENT
Start: 2023-09-20 | End: 2023-09-20 | Stop reason: SURG

## 2023-09-20 RX ORDER — MAGNESIUM HYDROXIDE 1200 MG/15ML
LIQUID ORAL AS NEEDED
Status: DISCONTINUED | OUTPATIENT
Start: 2023-09-20 | End: 2023-09-20 | Stop reason: HOSPADM

## 2023-09-20 RX ORDER — MIDAZOLAM HYDROCHLORIDE 2 MG/2ML
2 INJECTION, SOLUTION INTRAMUSCULAR; INTRAVENOUS ONCE
Status: COMPLETED | OUTPATIENT
Start: 2023-09-20 | End: 2023-09-20

## 2023-09-20 RX ORDER — HYDROCODONE BITARTRATE AND ACETAMINOPHEN 5; 325 MG/1; MG/1
1 TABLET ORAL EVERY 4 HOURS PRN
Qty: 25 TABLET | Refills: 0 | Status: SHIPPED | OUTPATIENT
Start: 2023-09-20 | End: 2023-09-22

## 2023-09-20 RX ORDER — PROPOFOL 10 MG/ML
VIAL (ML) INTRAVENOUS AS NEEDED
Status: DISCONTINUED | OUTPATIENT
Start: 2023-09-20 | End: 2023-09-20 | Stop reason: SURG

## 2023-09-20 RX ORDER — SODIUM CHLORIDE, SODIUM LACTATE, POTASSIUM CHLORIDE, CALCIUM CHLORIDE 600; 310; 30; 20 MG/100ML; MG/100ML; MG/100ML; MG/100ML
9 INJECTION, SOLUTION INTRAVENOUS CONTINUOUS PRN
Status: DISCONTINUED | OUTPATIENT
Start: 2023-09-20 | End: 2023-09-20 | Stop reason: HOSPADM

## 2023-09-20 RX ORDER — PHENYLEPHRINE HCL IN 0.9% NACL 1 MG/10 ML
SYRINGE (ML) INTRAVENOUS AS NEEDED
Status: DISCONTINUED | OUTPATIENT
Start: 2023-09-20 | End: 2023-09-20 | Stop reason: SURG

## 2023-09-20 RX ORDER — ROCURONIUM BROMIDE 10 MG/ML
INJECTION, SOLUTION INTRAVENOUS AS NEEDED
Status: DISCONTINUED | OUTPATIENT
Start: 2023-09-20 | End: 2023-09-20 | Stop reason: SURG

## 2023-09-20 RX ORDER — BUPIVACAINE HYDROCHLORIDE AND EPINEPHRINE 5; 5 MG/ML; UG/ML
INJECTION, SOLUTION EPIDURAL; INTRACAUDAL; PERINEURAL AS NEEDED
Status: DISCONTINUED | OUTPATIENT
Start: 2023-09-20 | End: 2023-09-20 | Stop reason: HOSPADM

## 2023-09-20 RX ORDER — ACETAMINOPHEN 500 MG
1000 TABLET ORAL ONCE
Status: COMPLETED | OUTPATIENT
Start: 2023-09-20 | End: 2023-09-20

## 2023-09-20 RX ORDER — ONDANSETRON 2 MG/ML
4 INJECTION INTRAMUSCULAR; INTRAVENOUS ONCE AS NEEDED
Status: DISCONTINUED | OUTPATIENT
Start: 2023-09-20 | End: 2023-09-20 | Stop reason: HOSPADM

## 2023-09-20 RX ORDER — DEXAMETHASONE SODIUM PHOSPHATE 4 MG/ML
INJECTION, SOLUTION INTRA-ARTICULAR; INTRALESIONAL; INTRAMUSCULAR; INTRAVENOUS; SOFT TISSUE AS NEEDED
Status: DISCONTINUED | OUTPATIENT
Start: 2023-09-20 | End: 2023-09-20 | Stop reason: SURG

## 2023-09-20 RX ORDER — FENTANYL CITRATE 50 UG/ML
INJECTION, SOLUTION INTRAMUSCULAR; INTRAVENOUS AS NEEDED
Status: DISCONTINUED | OUTPATIENT
Start: 2023-09-20 | End: 2023-09-20 | Stop reason: SURG

## 2023-09-20 RX ADMIN — PROPOFOL 250 MCG/KG/MIN: 10 INJECTION, EMULSION INTRAVENOUS at 10:42

## 2023-09-20 RX ADMIN — DEXMEDETOMIDINE 4 MCG: 100 INJECTION, SOLUTION INTRAVENOUS at 11:58

## 2023-09-20 RX ADMIN — ACETAMINOPHEN 1000 MG: 500 TABLET ORAL at 09:24

## 2023-09-20 RX ADMIN — DEXMEDETOMIDINE 4 MCG: 100 INJECTION, SOLUTION INTRAVENOUS at 11:51

## 2023-09-20 RX ADMIN — PROPOFOL 150 MG: 10 INJECTION, EMULSION INTRAVENOUS at 11:45

## 2023-09-20 RX ADMIN — SUGAMMADEX 150 MG: 100 INJECTION, SOLUTION INTRAVENOUS at 11:52

## 2023-09-20 RX ADMIN — SODIUM CHLORIDE, POTASSIUM CHLORIDE, SODIUM LACTATE AND CALCIUM CHLORIDE 9 ML/HR: 600; 310; 30; 20 INJECTION, SOLUTION INTRAVENOUS at 09:24

## 2023-09-20 RX ADMIN — DEXAMETHASONE SODIUM PHOSPHATE 4 MG: 4 INJECTION, SOLUTION INTRAMUSCULAR; INTRAVENOUS at 10:40

## 2023-09-20 RX ADMIN — FENTANYL CITRATE 50 MCG: 50 INJECTION, SOLUTION INTRAMUSCULAR; INTRAVENOUS at 11:08

## 2023-09-20 RX ADMIN — DEXMEDETOMIDINE 4 MCG: 100 INJECTION, SOLUTION INTRAVENOUS at 11:34

## 2023-09-20 RX ADMIN — ONDANSETRON 4 MG: 2 INJECTION INTRAMUSCULAR; INTRAVENOUS at 10:40

## 2023-09-20 RX ADMIN — Medication 100 MCG: at 11:05

## 2023-09-20 RX ADMIN — LIDOCAINE HYDROCHLORIDE 60 MG: 20 INJECTION, SOLUTION EPIDURAL; INFILTRATION; INTRACAUDAL; PERINEURAL at 10:40

## 2023-09-20 RX ADMIN — DEXMEDETOMIDINE 4 MCG: 100 INJECTION, SOLUTION INTRAVENOUS at 11:48

## 2023-09-20 RX ADMIN — FENTANYL CITRATE 50 MCG: 50 INJECTION, SOLUTION INTRAMUSCULAR; INTRAVENOUS at 11:47

## 2023-09-20 RX ADMIN — MIDAZOLAM HYDROCHLORIDE 2 MG: 1 INJECTION, SOLUTION INTRAMUSCULAR; INTRAVENOUS at 10:14

## 2023-09-20 RX ADMIN — ROCURONIUM BROMIDE 50 MG: 50 INJECTION INTRAVENOUS at 10:40

## 2023-09-20 RX ADMIN — METOCLOPRAMIDE HYDROCHLORIDE 10 MG: 5 INJECTION INTRAMUSCULAR; INTRAVENOUS at 10:17

## 2023-09-20 RX ADMIN — Medication 100 MCG: at 10:49

## 2023-09-20 RX ADMIN — CEFAZOLIN SODIUM 2 G: 2 INJECTION, SOLUTION INTRAVENOUS at 10:44

## 2023-09-20 RX ADMIN — DEXMEDETOMIDINE 4 MCG: 100 INJECTION, SOLUTION INTRAVENOUS at 11:43

## 2023-09-20 RX ADMIN — PROPOFOL 120 MG: 10 INJECTION, EMULSION INTRAVENOUS at 10:40

## 2023-09-20 NOTE — OP NOTE
LUMBAR LAMINECTOMY DISCECTOMY MINIMALLY INVASIVE  Procedure Report    Patient Name:  Belem Acosta  YOB: 1945    Date of Surgery:  9/20/2023     Indications: Lumbar 3-lumbar 4 stenosis with left greater than right leg pain    Pre-op Diagnosis:   Spinal stenosis of lumbar region with neurogenic claudication [M48.062]       Post-Op Diagnosis Codes:     * Spinal stenosis of lumbar region with neurogenic claudication [M48.062]    Procedure/CPT® Codes:  07709, 13212    Procedure(s):  MINIMALLY INVASIVE LUMBAR LAMINECTOMY, left approach, lumbar 3-lumbar 4    Staff:  Surgeon(s):  Sb Jones MD    Assistant: Pauline Kapoor RN CSA    Anesthesia: General    Estimated Blood Loss:  30 mL      Specimen:          None        Findings: Lateral recess stenosis with some adherence of the dura to the overgrown ligament.    Complications: No apparent intraoperative complications    Description of Procedure: After informed consent was obtained, the patient was brought to the operating room.  After induction of adequate general endotracheal anesthesia the patient was placed in the prone position on the Robert table utilizing the Edgar frame.  All pressure points were padded.  The back was prepped and draped in the typical fashion.  The midline was marked and a line approximately 2-1/2 cm to the left of midline was drawn.  On this line the L3-4 interspace was localized using a spinal needle and the C arm.  A 2 cm incision was then made over the level.  The Boss tubular retractor system was used to dilate the tissue docking at L3-4.  The level was again confirmed with fluoroscopy.  The microscope was brought in and used for the remainder of the case for improved magnification and illumination.  The lamina was cleared of soft tissue and the Kerrison punches were used to remove the lamina above the insertion of the ligamentum flavum.  The ligamentum flavum was then resected inferiorly and laterally  undercutting the medial facet.  The ligament was somewhat adherent to the underlying dura and the dissector was used to dissect this free.  After undercutting the medial facet and ligament, the ball probe passed freely along the left L4 nerve root.  The tubular retractor was tilted medially to allow exposure of the patient's right lateral recess.  The ligament was removed from its insertion at L4 and the right lateral recess was then undercut using the Kerrison punches until a ball probe passed freely along the right L4 nerve root. After decompression was felt to be adequate, hemostasis was obtained using the bipolar electrocautery as well as Gelfoam.  The wound was irrigated with normal saline.    The tubular retractor was removed and hemostasis assured in the soft tissue.  The incision was reapproximated using interrupted 0 Vicryl in the fascia and a 2-0 Vicryl in the subcutaneous tissue.  The wound was dressed with Mastisol, Steri-Strips, Telfa and a Tegaderm.     Assistant: Pauline Kapoor RN CSA  was responsible for performing the following activities: Retraction, Suction, Irrigation, Closing, and Placing Dressing and their skilled assistance was necessary for the success of this case.    Sb Jones MD     Date: 9/20/2023  Time: 12:04 EDT

## 2023-09-20 NOTE — ANESTHESIA POSTPROCEDURE EVALUATION
Patient: Belem Acosta    Procedure Summary       Date: 09/20/23 Room / Location: [facility] OR 05 / [facility] MAIN OR    Anesthesia Start: 1036 Anesthesia Stop: 1205    Procedure: MINIMALLY INVASIVE LUMBAR LAMINECTOMY, left approach, lumbar 3-lumbar 4 (Left: Back) Diagnosis:       Spinal stenosis of lumbar region with neurogenic claudication      (Spinal stenosis of lumbar region with neurogenic claudication [M48.062])    Surgeons: Sb Jones MD Provider: Jeremiah Cardozo MD    Anesthesia Type: general ASA Status: 3            Anesthesia Type: general    Vitals  Vitals Value Taken Time   BP 91/63 09/20/23 1229   Temp 37.2 °C (98.9 °F) 09/20/23 1205   Pulse 67 09/20/23 1232   Resp 21 09/20/23 1220   SpO2 97 % 09/20/23 1232   Vitals shown include unvalidated device data.        Post Anesthesia Care and Evaluation    Patient location during evaluation: bedside  Patient participation: complete - patient participated  Level of consciousness: awake  Pain score: 2  Pain management: adequate    Airway patency: patent  PONV Status: none  Cardiovascular status: acceptable and stable  Respiratory status: acceptable  Hydration status: acceptable    Comments: An Anesthesiologist personally participated in the most demanding procedures (including induction and emergence if applicable) in the anesthesia plan, monitored the course of anesthesia administration at frequent intervals and remained physically present and available for immediate diagnosis and treatment of emergencies.

## 2023-09-20 NOTE — ANESTHESIA PREPROCEDURE EVALUATION
Anesthesia Evaluation     Patient summary reviewed and Nursing notes reviewed   history of anesthetic complications:  PONV  NPO Solid Status: > 8 hours  NPO Liquid Status: > 2 hours           Airway   Mallampati: II  TM distance: >3 FB  Neck ROM: full  No difficulty expected  Dental      Pulmonary - normal exam    breath sounds clear to auscultation  (+) ,sleep apnea  Cardiovascular - normal exam  Exercise tolerance: good (4-7 METS)    Rhythm: regular  Rate: normal    (+) hypertension, hyperlipidemia      Neuro/Psych  (+) numbness, psychiatric history  GI/Hepatic/Renal/Endo    (+) diabetes mellitus type 2, thyroid problem     Musculoskeletal     Abdominal    Substance History - negative use     OB/GYN negative ob/gyn ROS         Other   arthritis,     ROS/Med Hx Other: PAT Nursing Notes unavailable.                 Anesthesia Plan    ASA 3     general     (Patient understands anesthesia not responsible for dental damage.)  intravenous induction     Anesthetic plan, risks, benefits, and alternatives have been provided, discussed and informed consent has been obtained with: patient.    Use of blood products discussed with patient .    Plan discussed with CRNA.    CODE STATUS:

## 2023-09-20 NOTE — DISCHARGE INSTRUCTIONS
DISCHARGE INSTRUCTIONS  DISCECTOMY/ LAMINECTOMY  [x] MINIMALLY INVASIVE      For your surgery you had:  General anesthesia (you may have a sore throat for the first 24 hours)  You may experience dizziness, drowsiness, or light-headedness for several hours following surgery  Do not stay alone today or tonight.  Limit your activity for 24 hours.  You should not drive, operate machinery, drink alcohol, or sign legally binding documents for 24 hours or while you are taking pain medication.  Activity  For the first two weeks following surgery, remain close to home and do not do any lifting, bending or strenuous activity.  Walking is the best exercise and you can increase the amount you walk as you feel like it.  Riding in a car for short distances (15-20 minutes) is okay but do not drive until you are off all narcotic medications.  If you have a sedentary job, you may resume work as soon as you feel like it (this is rarely less than one week).  Pain Control  Take your pain medicines as needed and as prescribed.  As you are feeling better, you can decrease the prescribed pain medication and take over-the-counter medications such as Tylenol or Ibuprofen.  The prescribed pain medicines tend to be constipating so it is important to eat a well-balanced diet and take a stool softener if recommended by the doctor.  Activity such as walking also helps keep your bowels regular.    Incision Care  Your sutures are under the skin and will dissolve in time.  You may shower as soon as you like.    [x] You have a clear dressing, which you should remove in 3-4 days.  Beneath this dressing are steri-strips that will peel off over time.  If these steri-strips have not peeled off in 10-14 days, you may remove them.  [] Gently washing your incision with mild soap and rinsing with water during your shower is all you need to do to care for the incision.  Do not scrub the incision or sit in the bathtub.  Check your incision site each day to  see how it looks.  Some redness and bruising is normal for the first few days.  NOTIFY YOUR DOCTOR IF YOU EXPERIENCE ANY OF THE FOLLOWING:   You have a fever over 100.8o Fahrenheit orally  Shaking chills  Your incision is red, hot to touch, or has excessive drainage  Your incision starts to separate  Increase in bleeding or bleeding that is excessive  Nausea, vomiting and/or pain not controlled by prescribed medications  Unable to urinate in 6 hours after surgery  You may contact 's clinic at [phone] with any questions or concerns.  If unable to reach your doctor, please go to the closest emergency room.  SPECIAL INSTRUCTIONS:  1) No driving for 5-7 days and no longer taking narcotics.   2) May shower as soon as desired, no submerging incision.   3) Do not lift / push / pull more than 8-10 lbs.   4) Minimize bending/twisting at the waist and jarring activity       such as lawnmower.     Last dose of pain medication was given at:   Tylenol 1000 mg given at 9:24 am. Do not exceed 4000 mg in a 24 hour period.

## 2023-09-22 ENCOUNTER — TELEPHONE (OUTPATIENT)
Dept: NEUROSURGERY | Facility: CLINIC | Age: 78
End: 2023-09-22
Payer: MEDICARE

## 2023-09-22 DIAGNOSIS — M48.062 SPINAL STENOSIS OF LUMBAR REGION WITH NEUROGENIC CLAUDICATION: Primary | ICD-10-CM

## 2023-09-22 DIAGNOSIS — M48.062 SPINAL STENOSIS OF LUMBAR REGION WITH NEUROGENIC CLAUDICATION: ICD-10-CM

## 2023-09-22 RX ORDER — OXYCODONE HYDROCHLORIDE AND ACETAMINOPHEN 5; 325 MG/1; MG/1
1 TABLET ORAL EVERY 4 HOURS PRN
Qty: 25 TABLET | Refills: 0 | Status: SHIPPED | OUTPATIENT
Start: 2023-09-22

## 2023-09-22 RX ORDER — OXYCODONE HYDROCHLORIDE AND ACETAMINOPHEN 5; 325 MG/1; MG/1
1 TABLET ORAL EVERY 4 HOURS PRN
Qty: 25 TABLET | Refills: 0 | Status: SHIPPED | OUTPATIENT
Start: 2023-09-22 | End: 2023-09-22 | Stop reason: SDUPTHER

## 2023-09-22 NOTE — TELEPHONE ENCOUNTER
"Patient had MIL 9-20-23. Her  left a message this morning stating starting last night she is having \"terrible pain\" and her legs feel weak. States she is unable to stand with walker to get to the bathroom. States pain medication is not helping. Does she need to report to ER or do you have other recommendations?  "

## 2023-09-26 ENCOUNTER — TELEPHONE (OUTPATIENT)
Dept: NEUROSURGERY | Facility: CLINIC | Age: 78
End: 2023-09-26
Payer: MEDICARE

## 2023-09-26 NOTE — TELEPHONE ENCOUNTER
Belem requesting a call back. She had sx 9/20. She is having a lot of pain with ambulation. She says the pain medication is not lasting until next dose. Patient want clarification if this is how she should expect to be feeling.

## 2023-09-27 RX ORDER — METHYLPREDNISOLONE 4 MG/1
TABLET ORAL
Qty: 21 TABLET | Refills: 0 | Status: SHIPPED | OUTPATIENT
Start: 2023-09-27

## 2023-09-28 ENCOUNTER — TELEPHONE (OUTPATIENT)
Dept: NEUROSURGERY | Facility: CLINIC | Age: 78
End: 2023-09-28

## 2023-09-28 NOTE — TELEPHONE ENCOUNTER
Patient left a voicemail that she doesn't believe she needs to come in to appt today, after the steroid she is feeling much better.

## 2023-10-04 LAB
QT INTERVAL: 388 MS
QTC INTERVAL: 464 MS

## 2023-10-12 ENCOUNTER — LAB (OUTPATIENT)
Dept: LAB | Facility: HOSPITAL | Age: 78
End: 2023-10-12
Payer: MEDICARE

## 2023-10-12 ENCOUNTER — OFFICE VISIT (OUTPATIENT)
Dept: FAMILY MEDICINE CLINIC | Facility: CLINIC | Age: 78
End: 2023-10-12
Payer: MEDICARE

## 2023-10-12 VITALS
BODY MASS INDEX: 37.42 KG/M2 | SYSTOLIC BLOOD PRESSURE: 165 MMHG | DIASTOLIC BLOOD PRESSURE: 54 MMHG | WEIGHT: 211.2 LBS | HEART RATE: 83 BPM | HEIGHT: 63 IN

## 2023-10-12 DIAGNOSIS — I10 PRIMARY HYPERTENSION: ICD-10-CM

## 2023-10-12 DIAGNOSIS — Z78.0 POST-MENOPAUSAL: ICD-10-CM

## 2023-10-12 DIAGNOSIS — Z79.4 TYPE 2 DIABETES MELLITUS WITHOUT COMPLICATION, WITH LONG-TERM CURRENT USE OF INSULIN: ICD-10-CM

## 2023-10-12 DIAGNOSIS — Z13.220 SCREENING FOR LIPID DISORDERS: ICD-10-CM

## 2023-10-12 DIAGNOSIS — Z00.00 MEDICARE ANNUAL WELLNESS VISIT, SUBSEQUENT: Primary | ICD-10-CM

## 2023-10-12 DIAGNOSIS — E11.9 TYPE 2 DIABETES MELLITUS WITHOUT COMPLICATION, WITH LONG-TERM CURRENT USE OF INSULIN: ICD-10-CM

## 2023-10-12 DIAGNOSIS — Z13.820 SCREENING FOR OSTEOPOROSIS: ICD-10-CM

## 2023-10-12 DIAGNOSIS — F41.9 ANXIETY: ICD-10-CM

## 2023-10-12 DIAGNOSIS — E78.5 HYPERLIPIDEMIA, UNSPECIFIED HYPERLIPIDEMIA TYPE: ICD-10-CM

## 2023-10-12 DIAGNOSIS — Z00.00 MEDICARE ANNUAL WELLNESS VISIT, SUBSEQUENT: ICD-10-CM

## 2023-10-12 DIAGNOSIS — E03.9 HYPOTHYROIDISM, UNSPECIFIED TYPE: ICD-10-CM

## 2023-10-12 DIAGNOSIS — E55.9 VITAMIN D DEFICIENCY: ICD-10-CM

## 2023-10-12 LAB
ALBUMIN SERPL-MCNC: 4.5 G/DL (ref 3.5–5.2)
ALBUMIN/GLOB SERPL: 1.5 G/DL
ALP SERPL-CCNC: 105 U/L (ref 39–117)
ALT SERPL W P-5'-P-CCNC: 21 U/L (ref 1–33)
ANION GAP SERPL CALCULATED.3IONS-SCNC: 10 MMOL/L (ref 5–15)
AST SERPL-CCNC: 17 U/L (ref 1–32)
BASOPHILS # BLD AUTO: 0.05 10*3/MM3 (ref 0–0.2)
BASOPHILS NFR BLD AUTO: 0.6 % (ref 0–1.5)
BILIRUB SERPL-MCNC: 0.4 MG/DL (ref 0–1.2)
BUN SERPL-MCNC: 13 MG/DL (ref 8–23)
BUN/CREAT SERPL: 25 (ref 7–25)
CALCIUM SPEC-SCNC: 10.3 MG/DL (ref 8.6–10.5)
CHLORIDE SERPL-SCNC: 105 MMOL/L (ref 98–107)
CHOLEST SERPL-MCNC: 187 MG/DL (ref 0–200)
CO2 SERPL-SCNC: 28 MMOL/L (ref 22–29)
CREAT SERPL-MCNC: 0.52 MG/DL (ref 0.57–1)
DEPRECATED RDW RBC AUTO: 40.5 FL (ref 37–54)
EGFRCR SERPLBLD CKD-EPI 2021: 96.4 ML/MIN/1.73
EOSINOPHIL # BLD AUTO: 0.1 10*3/MM3 (ref 0–0.4)
EOSINOPHIL NFR BLD AUTO: 1.2 % (ref 0.3–6.2)
ERYTHROCYTE [DISTWIDTH] IN BLOOD BY AUTOMATED COUNT: 13.2 % (ref 12.3–15.4)
GLOBULIN UR ELPH-MCNC: 3 GM/DL
GLUCOSE SERPL-MCNC: 107 MG/DL (ref 65–99)
HCT VFR BLD AUTO: 45.4 % (ref 34–46.6)
HDLC SERPL-MCNC: 57 MG/DL (ref 40–60)
HGB BLD-MCNC: 15.1 G/DL (ref 12–15.9)
IMM GRANULOCYTES # BLD AUTO: 0.03 10*3/MM3 (ref 0–0.05)
IMM GRANULOCYTES NFR BLD AUTO: 0.4 % (ref 0–0.5)
LDLC SERPL CALC-MCNC: 101 MG/DL (ref 0–100)
LDLC/HDLC SERPL: 1.7 {RATIO}
LYMPHOCYTES # BLD AUTO: 2.85 10*3/MM3 (ref 0.7–3.1)
LYMPHOCYTES NFR BLD AUTO: 33.8 % (ref 19.6–45.3)
MCH RBC QN AUTO: 28.3 PG (ref 26.6–33)
MCHC RBC AUTO-ENTMCNC: 33.3 G/DL (ref 31.5–35.7)
MCV RBC AUTO: 85 FL (ref 79–97)
MONOCYTES # BLD AUTO: 0.55 10*3/MM3 (ref 0.1–0.9)
MONOCYTES NFR BLD AUTO: 6.5 % (ref 5–12)
NEUTROPHILS NFR BLD AUTO: 4.85 10*3/MM3 (ref 1.7–7)
NEUTROPHILS NFR BLD AUTO: 57.5 % (ref 42.7–76)
NRBC BLD AUTO-RTO: 0 /100 WBC (ref 0–0.2)
PLATELET # BLD AUTO: 417 10*3/MM3 (ref 140–450)
PMV BLD AUTO: 9.1 FL (ref 6–12)
POTASSIUM SERPL-SCNC: 4.1 MMOL/L (ref 3.5–5.2)
PROT SERPL-MCNC: 7.5 G/DL (ref 6–8.5)
RBC # BLD AUTO: 5.34 10*6/MM3 (ref 3.77–5.28)
SODIUM SERPL-SCNC: 143 MMOL/L (ref 136–145)
T4 FREE SERPL-MCNC: 1.47 NG/DL (ref 0.93–1.7)
TRIGL SERPL-MCNC: 166 MG/DL (ref 0–150)
TSH SERPL DL<=0.05 MIU/L-ACNC: 0.73 UIU/ML (ref 0.27–4.2)
VLDLC SERPL-MCNC: 29 MG/DL (ref 5–40)
WBC NRBC COR # BLD: 8.43 10*3/MM3 (ref 3.4–10.8)

## 2023-10-12 PROCEDURE — 83036 HEMOGLOBIN GLYCOSYLATED A1C: CPT

## 2023-10-12 PROCEDURE — 85025 COMPLETE CBC W/AUTO DIFF WBC: CPT

## 2023-10-12 PROCEDURE — 82306 VITAMIN D 25 HYDROXY: CPT

## 2023-10-12 PROCEDURE — 84443 ASSAY THYROID STIM HORMONE: CPT

## 2023-10-12 PROCEDURE — 80053 COMPREHEN METABOLIC PANEL: CPT

## 2023-10-12 PROCEDURE — 80061 LIPID PANEL: CPT

## 2023-10-12 PROCEDURE — 84439 ASSAY OF FREE THYROXINE: CPT

## 2023-10-12 PROCEDURE — 36415 COLL VENOUS BLD VENIPUNCTURE: CPT

## 2023-10-12 RX ORDER — SIMVASTATIN 40 MG
40 TABLET ORAL NIGHTLY
Qty: 90 TABLET | Refills: 1 | Status: SHIPPED | OUTPATIENT
Start: 2023-10-12 | End: 2023-10-16 | Stop reason: SDUPTHER

## 2023-10-12 RX ORDER — BISOPROLOL FUMARATE 5 MG/1
2.5 TABLET, FILM COATED ORAL DAILY
Qty: 90 TABLET | Refills: 3 | Status: SHIPPED | OUTPATIENT
Start: 2023-10-12 | End: 2023-10-16 | Stop reason: SDUPTHER

## 2023-10-12 NOTE — PROGRESS NOTES
The ABCs of the Annual Wellness Visit  Subsequent Medicare Wellness Visit    Subjective    Belem Acosta is a 78 y.o. female who presents for a Subsequent Medicare Wellness Visit.    The following portions of the patient's history were reviewed and   updated as appropriate: She  has a past medical history of Abnormal bone density screening (03/2019), Ankle pain, Anxiety, Arthritis, Bunion, Cervical disc disorder, Corns and callus, CTS (carpal tunnel syndrome), Diabetes mellitus, type 2, Hammertoe, Hyperglycemia, Hyperlipidemia, Hypertension, Hypothyroidism, Impaired fasting glucose (08/19/2014), Low back pain, Lumbosacral disc disease, Panic disorder, Pap smear for cervical cancer screening (2019), PONV (postoperative nausea and vomiting), Screening mammogram, encounter for (05/2020), Sensorineural hearing loss (SNHL) of both ears, Sleep apnea (08/26/2015), Sleep apnea, and Thoracic disc disorder.  She does not have any pertinent problems on file.  She  has a past surgical history that includes CARPAL TUNNEL INJECTION (2002 2004); Cataract extraction w/  intraocular lens implant (2012); Cholecystectomy (1982); Colonoscopy (02/2017); Rotator cuff repair (06/2013); Thyroidectomy, partial (1997); Wrist surgery (2008); Carpal tunnel release; Epidural block injection; and Lumbar laminectomy (Left, 9/20/2023).  Her family history includes Arthritis in her mother; COPD in her father; Diabetes in her brother and mother; Emphysema in her father; Heart disease in her father, maternal grandfather, maternal grandmother, mother, and another family member; Heart failure in her father and mother; Hyperlipidemia in her brother, mother, and sister; Hypertension in her mother; Pancreatic cancer in her brother; Stroke in an other family member.  She  reports that she has never smoked. She has never used smokeless tobacco. She reports current alcohol use of about 3.0 standard drinks of alcohol per week. She reports that she does  not use drugs.  Current Outpatient Medications   Medication Sig Dispense Refill    BD Pen Needle Juana U/F 32G X 4 MM misc USE 1 PEN NEEDLE DAILY ONCE IN THE MORNING 90 each 3    bisoprolol (ZEBeta) 5 MG tablet Take 0.5 tablets by mouth Daily. 90 tablet 3    Farxiga 10 MG tablet TAKE 1 TABLET DAILY (Patient taking differently: Every Night.) 90 tablet 3    latanoprost (XALATAN) 0.005 % ophthalmic solution Administer 1 drop to both eyes Every Night.      levothyroxine (SYNTHROID, LEVOTHROID) 112 MCG tablet TAKE 1 TABLET EVERY MORNING 90 tablet 3    PARoxetine (PAXIL) 20 MG tablet TAKE 1 TABLET EVERY MORNING (Patient taking differently: Take 1 tablet by mouth Every Night.) 90 tablet 3    simvastatin (ZOCOR) 40 MG tablet Take 1 tablet by mouth Every Night. 90 tablet 1    Toujeo Max SoloStar 300 UNIT/ML solution pen-injector injection INJECT 34 UNITS UNDER THE SKIN INTO THE APPROPRIATE AREA DAILY AS DIRECTED (Patient taking differently: 40 units currently) 24 mL 2     No current facility-administered medications for this visit.     Current Outpatient Medications on File Prior to Visit   Medication Sig    BD Pen Needle Juana U/F 32G X 4 MM misc USE 1 PEN NEEDLE DAILY ONCE IN THE MORNING    Farxiga 10 MG tablet TAKE 1 TABLET DAILY (Patient taking differently: Every Night.)    latanoprost (XALATAN) 0.005 % ophthalmic solution Administer 1 drop to both eyes Every Night.    levothyroxine (SYNTHROID, LEVOTHROID) 112 MCG tablet TAKE 1 TABLET EVERY MORNING    PARoxetine (PAXIL) 20 MG tablet TAKE 1 TABLET EVERY MORNING (Patient taking differently: Take 1 tablet by mouth Every Night.)    Toujeo Max SoloStar 300 UNIT/ML solution pen-injector injection INJECT 34 UNITS UNDER THE SKIN INTO THE APPROPRIATE AREA DAILY AS DIRECTED (Patient taking differently: 40 units currently)    [DISCONTINUED] bisoprolol (ZEBeta) 5 MG tablet TAKE ONE-HALF (1/2) TABLET DAILY    [DISCONTINUED] simvastatin (ZOCOR) 40 MG tablet TAKE 1 TABLET DAILY (Patient  taking differently: Take 1 tablet by mouth Every Night.)    [DISCONTINUED] methylPREDNISolone (MEDROL) 4 MG dose pack Take as directed on package instructions. (Patient not taking: Reported on 10/12/2023)    [DISCONTINUED] oxyCODONE-acetaminophen (Percocet) 5-325 MG per tablet Take 1 tablet by mouth Every 4 (Four) Hours As Needed (Pain). (Patient not taking: Reported on 10/12/2023)     No current facility-administered medications on file prior to visit.     She is allergic to metformin and lisinopril..    Compared to one year ago, the patient feels her physical   health is the same.    Compared to one year ago, the patient feels her mental   health is the same.    Recent Hospitalizations:  She was not  admitted within the past 365 days at     Current Medical Providers:  Patient Care Team:  Oneyda Taylor APRN as PCP - General (Nurse Practitioner)  Elena Nuñez APRN as Nurse Practitioner (Nurse Practitioner)    Outpatient Medications Prior to Visit   Medication Sig Dispense Refill    BD Pen Needle Juana U/F 32G X 4 MM misc USE 1 PEN NEEDLE DAILY ONCE IN THE MORNING 90 each 3    Farxiga 10 MG tablet TAKE 1 TABLET DAILY (Patient taking differently: Every Night.) 90 tablet 3    latanoprost (XALATAN) 0.005 % ophthalmic solution Administer 1 drop to both eyes Every Night.      levothyroxine (SYNTHROID, LEVOTHROID) 112 MCG tablet TAKE 1 TABLET EVERY MORNING 90 tablet 3    PARoxetine (PAXIL) 20 MG tablet TAKE 1 TABLET EVERY MORNING (Patient taking differently: Take 1 tablet by mouth Every Night.) 90 tablet 3    Toujeo Max SoloStar 300 UNIT/ML solution pen-injector injection INJECT 34 UNITS UNDER THE SKIN INTO THE APPROPRIATE AREA DAILY AS DIRECTED (Patient taking differently: 40 units currently) 24 mL 2    bisoprolol (ZEBeta) 5 MG tablet TAKE ONE-HALF (1/2) TABLET DAILY 90 tablet 3    simvastatin (ZOCOR) 40 MG tablet TAKE 1 TABLET DAILY (Patient taking differently: Take 1 tablet by mouth Every Night.) 90 tablet 3  "   methylPREDNISolone (MEDROL) 4 MG dose pack Take as directed on package instructions. (Patient not taking: Reported on 10/12/2023) 21 tablet 0    oxyCODONE-acetaminophen (Percocet) 5-325 MG per tablet Take 1 tablet by mouth Every 4 (Four) Hours As Needed (Pain). (Patient not taking: Reported on 10/12/2023) 25 tablet 0     No facility-administered medications prior to visit.       No opioid medication identified on active medication list. I have reviewed chart for other potential  high risk medication/s and harmful drug interactions in the elderly.        Aspirin is not on active medication list.  Aspirin use is not indicated based on review of current medical condition/s. Risk of harm outweighs potential benefits.  .    Patient Active Problem List   Diagnosis    Arthritis    Diabetes mellitus, type II    Bunion    Hammertoe    Hyperlipidemia    Hypertension    Hypothyroidism    Sensorineural hearing loss (SNHL) of left ear    MANGO on CPAP    Class 2 obesity    Anxiety     Advance Care Planning   Advance Care Planning     Advance Directive is not on file.  ACP discussion was held with the patient during this visit. Patient has an advance directive (not in EMR), copy requested.     Objective    Vitals:    10/12/23 0930   BP: 165/54   BP Location: Right arm   Patient Position: Sitting   Cuff Size: Large Adult   Pulse: 83   Weight: 95.8 kg (211 lb 3.2 oz)   Height: 160 cm (63\")   PF: 96 L/min     Estimated body mass index is 37.41 kg/mý as calculated from the following:    Height as of this encounter: 160 cm (63\").    Weight as of this encounter: 95.8 kg (211 lb 3.2 oz).           Does the patient have evidence of cognitive impairment? No          HEALTH RISK ASSESSMENT    Smoking Status:  Social History     Tobacco Use   Smoking Status Never   Smokeless Tobacco Never     Alcohol Consumption:  Social History     Substance and Sexual Activity   Alcohol Use Yes    Alcohol/week: 3.0 standard drinks of alcohol    Types: 3 " Glasses of wine per week    Comment: Rarely     Fall Risk Screen:    [facility] Fall Risk Assessment was completed, and patient is at MODERATE risk for falls. Assessment completed on:10/12/2023    Depression Screening:      10/12/2023     9:39 AM   PHQ-2/PHQ-9 Depression Screening   Little Interest or Pleasure in Doing Things 0-->not at all   Feeling Down, Depressed or Hopeless 0-->not at all   PHQ-9: Brief Depression Severity Measure Score 0       Health Habits and Functional and Cognitive Screening:      10/12/2023     9:00 AM   Functional & Cognitive Status   Do you have difficulty preparing food and eating? No   Do you have difficulty bathing yourself, getting dressed or grooming yourself? No   Do you have difficulty using the toilet? No   Do you have difficulty moving around from place to place? No   Do you have trouble with steps or getting out of a bed or a chair? No   Current Diet Well Balanced Diet   Dental Exam Up to date   Eye Exam Up to date   Exercise (times per week) 3 times per week   Current Exercises Include Walking   Do you need help using the phone?  No   Are you deaf or do you have serious difficulty hearing?  No   Do you need help to go to places out of walking distance? No   Do you need help shopping? No   Do you need help preparing meals?  No   Do you need help with housework?  No   Do you need help with laundry? No   Do you need help taking your medications? No   Do you need help managing money? No   Do you ever drive or ride in a car without wearing a seat belt? No   Have you felt unusual stress, anger or loneliness in the last month? No   Who do you live with? Spouse   If you need help, do you have trouble finding someone available to you? No   Have you been bothered in the last four weeks by sexual problems? No   Do you have difficulty concentrating, remembering or making decisions? No       Age-appropriate Screening Schedule:  Refer to the list below for future screening recommendations based  on patient's age, sex and/or medical conditions. Orders for these recommended tests are listed in the plan section. The patient has been provided with a written plan.    Health Maintenance   Topic Date Due    DXA SCAN  06/14/2023    ZOSTER VACCINE (1 of 2) 10/12/2023 (Originally 8/4/1997)    DIABETIC EYE EXAM  11/30/2023 (Originally 8/1/2023)    COVID-19 Vaccine (5 - 2023-24 season) 01/01/2024 (Originally 9/1/2023)    INFLUENZA VACCINE  03/31/2024 (Originally 8/1/2023)    Pneumococcal Vaccine 65+ (2 - PCV) 10/12/2024 (Originally 12/9/2014)    ANNUAL WELLNESS VISIT  12/14/2023    LIPID PANEL  01/04/2024    URINE MICROALBUMIN  01/04/2024    HEMOGLOBIN A1C  01/11/2024    BMI FOLLOWUP  03/27/2024    TDAP/TD VACCINES (3 - Td or Tdap) 01/01/2026    HEPATITIS C SCREENING  Completed    COLORECTAL CANCER SCREENING  Discontinued                  CMS Preventative Services Quick Reference  Risk Factors Identified During Encounter  None Identified  The above risks/problems have been discussed with the patient.  Pertinent information has been shared with the patient in the After Visit Summary.  An After Visit Summary and PPPS were made available to the patient.    Follow Up:   Next Medicare Wellness visit to be scheduled in 1 year.       Additional E&M Note during same encounter follows:  Patient has multiple medical problems which are significant and separately identifiable that require additional work above and beyond the Medicare Wellness Visit.      Chief Complaint  Back Pain and Medicare Wellness-subsequent    Subjective        HPI  Belem Acosta is also being seen today for 6-month follow-up for chronic condition management.     Hypertension-patient is currently on Zebeta 5 mg.  Patient's blood pressure is slightly elevated at 165/64 in office today.  Patient reports her blood pressure at home usually runs 130s over 60s.  Patient denies any adverse effects of medication.  Requesting refills at this time.    Diabetes  type 2-patient is currently taking Farxiga 10 mg and Toujeo.  Patient is followed by diabetes management, BRISSA Carey.  Patient reports doing well medication.  Denies any adverse effects.    Hypothyroidism-patient is currently on levothyroxine 112 mcg.  Reports doing well medication.  Denies any adverse effects.  She is due updated TSH and T4.    Anxiety-patient is currently taking Paxil 20 mg.  Patient reports controls her anxiety well.  Denies any adverse effects.    Hyperlipidemia-patient is currently on Zocor 40 mg.  Patient reports doing well medication.  Denies any adverse effects.  Updated lipid panel today.    Patient is due mammogram and DEXA scan.  Patient reports she sees GYN in [place] and has this done.  Patient is established with sleep medicine doctor and with swallowing.  Patient reports she uses CPAP nightly.    Patient recently had laminectomy with Dr. Jones with neurosurgery.  Patient reports pain is greatly improved for the last 3 weeks since surgery.  Patient is still utilizing a cane for ambulation.    Patient questions EKG that she had prior to surgery.  Results showed sinus rhythm, prolonged MT interval and age indeterminant anteroseptal infarct.  She denies any history of MI.  Patient denies any chest pain shortness of breath syncope.    Patient is due labs. Orders placed at today's visit. Discussed with patient that these are fasting labs.     Discussed with patient seasonal vaccines that she is eligible for to include flu, pneumonia, RSV.    No other questions or concerns today.        Review of Systems   Constitutional: Negative.    HENT: Negative.     Eyes: Negative.    Respiratory: Negative.     Gastrointestinal: Negative.    Endocrine: Negative.    Genitourinary: Negative.    Musculoskeletal:  Positive for back pain.   Skin: Negative.    Allergic/Immunologic: Negative.    Neurological: Negative.    Hematological: Negative.    Psychiatric/Behavioral: Negative.    "      Objective   Vital Signs:  /54 (BP Location: Right arm, Patient Position: Sitting, Cuff Size: Large Adult)   Pulse 83   Ht 160 cm (63\")   Wt 95.8 kg (211 lb 3.2 oz)   PF 96 L/min   BMI 37.41 kg/mý     Physical Exam  Vitals reviewed.   Constitutional:       General: She is not in acute distress.     Appearance: Normal appearance.   HENT:      Head: Normocephalic and atraumatic.   Eyes:      Conjunctiva/sclera: Conjunctivae normal.   Cardiovascular:      Rate and Rhythm: Normal rate and regular rhythm.      Heart sounds: Normal heart sounds.   Pulmonary:      Effort: Pulmonary effort is normal.      Breath sounds: Normal breath sounds.   Musculoskeletal:      Cervical back: Normal range of motion.      Lumbar back: Tenderness present.   Skin:     General: Skin is warm and dry.   Neurological:      General: No focal deficit present.      Mental Status: She is alert and oriented to person, place, and time. Mental status is at baseline.      Comments: Antalgic gait, utilizing cane    Psychiatric:         Mood and Affect: Mood normal.         Behavior: Behavior normal.         Thought Content: Thought content normal.         Judgment: Judgment normal.          The following data was reviewed by: BRISSA Ruvalcaba on 10/12/2023:  Common labs          7/3/2023    07:10 7/11/2023    08:02 9/20/2023    08:20   Common Labs   Glucose   131    BUN   12    Creatinine 0.50   0.53    Sodium   142    Potassium   3.7    Chloride   103    Calcium   9.6    Hemoglobin A1C  7.7                  Assessment and Plan   Diagnoses and all orders for this visit:    1. Medicare annual wellness visit, subsequent (Primary)  Comments:  care gaps addressed  Orders:  -     Comprehensive Metabolic Panel; Future  -     CBC & Differential; Future  -     Lipid Panel; Future  -     TSH+Free T4; Future  -     Vitamin D 25 hydroxy; Future  -     DEXA Bone Density Axial    2. Vitamin D deficiency  -     Vitamin D 25 hydroxy; Future    3. " Hypothyroidism, unspecified type  Comments:  stable on levothyroxine 112 mcg, continue, labs ordered  Orders:  -     TSH+Free T4; Future    4. Type 2 diabetes mellitus without complication, with long-term current use of insulin  Comments:  stable on farxiga and toujeo, continue to see DM for condition management  Orders:  -     Comprehensive Metabolic Panel; Future    5. Primary hypertension  Comments:  stable on Zebeta 5 mg  Orders:  -     Comprehensive Metabolic Panel; Future  -     CBC & Differential; Future  -     bisoprolol (ZEBeta) 5 MG tablet; Take 0.5 tablets by mouth Daily.  Dispense: 90 tablet; Refill: 3    6. Screening for lipid disorders  -     Lipid Panel; Future    7. Post-menopausal  -     DEXA Bone Density Axial    8. Screening for osteoporosis  -     DEXA Bone Density Axial    9. Hyperlipidemia, unspecified hyperlipidemia type  Comments:  stable on zocor 40 mg, continue, lipid panel ordered  Orders:  -     simvastatin (ZOCOR) 40 MG tablet; Take 1 tablet by mouth Every Night.  Dispense: 90 tablet; Refill: 1    10. Anxiety  Comments:  stable on paxil 20 mg, continue             Follow Up   No follow-ups on file.  Patient was given instructions and counseling regarding her condition or for health maintenance advice. Please see specific information pulled into the AVS if appropriate.

## 2023-10-13 LAB
25(OH)D3 SERPL-MCNC: 44.1 NG/ML (ref 30–100)
HBA1C MFR BLD: 7 % (ref 4.8–5.6)

## 2023-10-13 NOTE — PROGRESS NOTES
Chief Complaint  Diabetes (Follow up, no devices )    Referred By: BRISSA Ruvalcaba    Subjective          Belem Acosta presents to [facility] for diabetes medication management    History of Present Illness    Visit type:  follow-up  Diabetes type:  Type 2  Current diabetes status/concerns/issues:  She has not been using her Keyon-states it was not accurate and kept falling off. The only thing she liked was the convenience of it. She is checking her blood sugar twice daily. She has lost 21lbs since July using [employer].   Other health concerns: she had a laminectomy 4 wks ago. States the surgery went well. She has a follow up today. She ended up having to have steroids due to the pain.   Current Diabetes symptoms:    Polyuria: No   Polydipsia: No   Polyphagia: No   Blurred vision: No   Excessive fatigue: No  Known Diabetes complications:  Neuro: None  Renal: None  Eyes: None She has an eye exam twice yr with  and Dr.Mark Santos.  Amputation/Wounds: None  GI: None  Cardiovascular: HTN, Hyperlipidemia  ED: N/A  Other: None  Hypoglycemia:  None reported at this time  Hypoglycemia Symptoms:  No hypoglycemia at this time  Current diabetes treatment:  Toujeo 40 units qd, Farxiga 10mg qd   Blood glucose device:  Meter  Blood glucose monitoring frequency:  2  Blood glucose range/average:   100-130  Glucose Source: BG Logs  Diet:  Limits high carb/sweet foods, Avoids sugary drinks, Number of meals each day - 3; Number of snacks each day - 1  Activity/Exercise:  walking 3-4 times wk    Past Medical History:   Diagnosis Date    Abnormal bone density screening 03/2019    Normal    Ankle pain     Anxiety     Arthritis     Bunion     Cervical disc disorder     Corns and callus     CTS (carpal tunnel syndrome)     Diabetes mellitus, type 2     Hammertoe     Hyperglycemia     Hyperlipidemia     Hypertension     Hypothyroidism     Impaired fasting glucose 08/19/2014    Low back pain      Lumbosacral disc disease     Panic disorder     Pap smear for cervical cancer screening 2019    NL PER PT SEES DR[place] PALOMINO [place]    PONV (postoperative nausea and vomiting)     Screening mammogram, encounter for 05/2020    NORMAL DR. PALOMINO [place]    Sensorineural hearing loss (SNHL) of both ears     Sleep apnea 08/26/2015    Sleep apnea     Thoracic disc disorder      Past Surgical History:   Procedure Laterality Date    CARPAL TUNNEL INJECTION  2002 2004    CARPAL TUNNEL RELEASE      CATARACT EXTRACTION WITH INTRAOCULAR LENS IMPLANT  2012    RIGHT EYE    CHOLECYSTECTOMY  1982    COLONOSCOPY  02/2017    POLYPS, TUBULAR ADENOMA    EPIDURAL BLOCK      LUMBAR LAMINECTOMY Left 9/20/2023    Procedure: MINIMALLY INVASIVE LUMBAR LAMINECTOMY, left approach, lumbar 3-lumbar 4;  Surgeon: Sb Jones MD;  Location: [facility] MAIN OR;  Service: Neurosurgery;  Laterality: Left;    ROTATOR CUFF REPAIR  06/2013    Left    THYROIDECTOMY, PARTIAL  1997    Rt thyroid d/t growth-bengin    WRIST SURGERY  2008     Family History   Problem Relation Age of Onset    Heart disease Mother     Heart failure Mother     Hypertension Mother     Diabetes Mother     Arthritis Mother     Hyperlipidemia Mother     Emphysema Father     Heart disease Father     Heart failure Father     COPD Father     Hyperlipidemia Sister     Pancreatic cancer Brother     Hyperlipidemia Brother     Diabetes Brother     Heart disease Maternal Grandmother     Heart disease Maternal Grandfather     Stroke Other     Heart disease Other     Malig Hyperthermia Neg Hx      Social History     Socioeconomic History    Marital status:    Tobacco Use    Smoking status: Never    Smokeless tobacco: Never   Vaping Use    Vaping Use: Never used   Substance and Sexual Activity    Alcohol use: Yes     Alcohol/week: 3.0 standard drinks of alcohol     Types: 3 Glasses of wine per week     Comment: Rarely    Drug use: Never    Sexual activity: Not  "Currently     Partners: Male     Allergies   Allergen Reactions    Metformin Nausea And Vomiting and Unknown - Low Severity    Lisinopril Cough       Current Outpatient Medications:     BD Pen Needle Juana U/F 32G X 4 MM misc, USE 1 PEN NEEDLE DAILY ONCE IN THE MORNING, Disp: 90 each, Rfl: 3    bisoprolol (ZEBeta) 5 MG tablet, Take 0.5 tablets by mouth Daily., Disp: 90 tablet, Rfl: 1    Farxiga 10 MG tablet, Take 10 mg by mouth Daily for 180 days., Disp: 90 tablet, Rfl: 1    latanoprost (XALATAN) 0.005 % ophthalmic solution, Administer 1 drop to both eyes Every Night., Disp: , Rfl:     levothyroxine (SYNTHROID, LEVOTHROID) 112 MCG tablet, TAKE 1 TABLET EVERY MORNING, Disp: 90 tablet, Rfl: 3    PARoxetine (PAXIL) 20 MG tablet, TAKE 1 TABLET EVERY MORNING (Patient taking differently: Take 1 tablet by mouth Every Night.), Disp: 90 tablet, Rfl: 3    simvastatin (ZOCOR) 40 MG tablet, Take 1 tablet by mouth Every Night., Disp: 90 tablet, Rfl: 1    Toujeo Max SoloStar 300 UNIT/ML solution pen-injector injection, Inject 40 Units under the skin into the appropriate area as directed Daily for 180 days., Disp: 12 mL, Rfl: 1    Objective     Vitals:    10/17/23 0752   BP: 152/70   BP Location: Left arm   Patient Position: Sitting   Cuff Size: Adult   Pulse: 75   SpO2: 94%   Weight: 98.4 kg (217 lb)   Height: 160 cm (63\")     Body mass index is 38.44 kg/m².    Physical Exam  Chaperone present: .w.   Constitutional:       Appearance: Normal appearance. She is normal weight. She is obese.      Comments: Obesity (BMI 30 - 39.9) Pt Current BMI = 38.44      HENT:      Head: Normocephalic and atraumatic.      Right Ear: External ear normal.      Left Ear: External ear normal.      Nose: Nose normal.   Eyes:      Extraocular Movements: Extraocular movements intact.      Conjunctiva/sclera: Conjunctivae normal.   Pulmonary:      Effort: Pulmonary effort is normal.   Musculoskeletal:         General: Normal range of motion.      Cervical " "back: Normal range of motion.   Skin:     General: Skin is warm and dry.   Neurological:      General: No focal deficit present.      Mental Status: She is alert and oriented to person, place, and time. Mental status is at baseline.   Psychiatric:         Mood and Affect: Mood normal.         Behavior: Behavior normal.         Thought Content: Thought content normal.         Judgment: Judgment normal.             Result Review :   The following data was reviewed by: BRISSA Melvin on 10/17/2023:    Most Recent A1C          10/12/2023    10:26   HGBA1C Most Recent   Hemoglobin A1C 7.00        A1C Last 3 Results          1/4/2023    08:42 7/11/2023    08:02 10/12/2023    10:26   HGBA1C Last 3 Results   Hemoglobin A1C 7.40  7.7  7.00      A1c collected in the office today is 7%, indicating Controlled Type II diabetes.  This result is down from the prior result of 7.7% collected on 7/11/2023    Glucose   Date Value Ref Range Status   10/17/2023 165 (H) 70 - 99 mg/dL Final     Comment:     Serial Number: [id number]:  [id number]     Point of care glucose in the office today is within normal limits for nonfasting glucose    Creatinine   Date Value Ref Range Status   10/12/2023 0.52 (L) 0.57 - 1.00 mg/dL Final   09/20/2023 0.53 (L) 0.57 - 1.00 mg/dL Final   07/03/2023 0.50 mg/dL Final     Comment:     Serial Number: [id number]:  [id number]     eGFR   Date Value Ref Range Status   10/12/2023 96.4 >60.0 mL/min/1.73 Final   09/20/2023 96.0 >60.0 mL/min/1.73 Final     Labs collected on 10/12/2023 show Normal values    Microalbumin, Urine   Date Value Ref Range Status   01/04/2023 1.3 mg/dL Final     No results found for: \"CREATININEUR\"  Microalbumin/Creatinine Ratio   Date Value Ref Range Status   04/01/2021 12.8 0.0 - 35.0 mg/g[Cre] Final   02/21/2020 13.2 0.0 - 35.0 mg/g[Cre] Final     Urine microalbuminuria collected on 1/4/2023 is negative for microalbuminuria    Total Cholesterol   Date Value Ref Range " Status   10/12/2023 187 0 - 200 mg/dL Final   01/04/2023 184 0 - 200 mg/dL Final     Triglycerides   Date Value Ref Range Status   10/12/2023 166 (H) 0 - 150 mg/dL Final   01/04/2023 159 (H) 0 - 150 mg/dL Final     HDL Cholesterol   Date Value Ref Range Status   10/12/2023 57 40 - 60 mg/dL Final   01/04/2023 57 40 - 60 mg/dL Final     LDL Cholesterol    Date Value Ref Range Status   10/12/2023 101 (H) 0 - 100 mg/dL Final   01/04/2023 100 0 - 100 mg/dL Final     Lipid panel collected on 10/12/2023 shows Hyperlipidemia and Hypertriglyceridemia            Assessment: Patient is here today for 3-month follow-up on her diabetes.  She reports she has not been wearing her tash because she was having difficulty with inaccurate readings and trouble with sensors staying on.  She has been checking her glucose levels manually.  She brought a blood sugar log to her appointment today and her glucose levels are ranging from 100 to 130 mg/dL.  Since her last visit she had a laminectomy and she was on steroids for a period of time due to pain.  She reports she has a follow-up with Dr. Sb Jones neurosurgery today.  Her A1c in the office today 7% which is down from her previous A1c of 7.7% in July.  She has lost 21 pounds since her last visit.  States she is on the weight watchers program.      Diagnoses and all orders for this visit:    1. Obesity (BMI 30-39.9) (Primary)    2. Type 2 diabetes mellitus without complication, with long-term current use of insulin  -     Toujeo Max SoloStar 300 UNIT/ML solution pen-injector injection; Inject 40 Units under the skin into the appropriate area as directed Daily for 180 days.  Dispense: 12 mL; Refill: 1  -     Farxiga 10 MG tablet; Take 10 mg by mouth Daily for 180 days.  Dispense: 90 tablet; Refill: 1    Other orders  -     POC Glucose        Plan: No changes were made at her appointment today.  Scheduled a 3-month follow-up and we will recheck her A1c at that time.    The patient will  monitor her blood glucose levels twice daily.  If she develops problematic hyperglycemia or hypoglycemia or adverse drug reactions, she will contact the office for further instructions.        Follow Up     Return in about 3 months (around 1/17/2024) for CGM Start, Medication Mgmt.    Patient was given instructions and counseling regarding her condition or for health maintenance advice. Please see specific information pulled into the AVS if appropriate.     Elena Nuñez, BRISSA  10/17/2023      Dictated Utilizing Dragon Dictation.  Please note that portions of this note were completed with a voice recognition program.  Part of this note may be an electronic transcription/translation of spoken language to printed text using the Dragon Dictation System.

## 2023-10-16 ENCOUNTER — TELEPHONE (OUTPATIENT)
Dept: FAMILY MEDICINE CLINIC | Facility: CLINIC | Age: 78
End: 2023-10-16
Payer: MEDICARE

## 2023-10-16 DIAGNOSIS — E78.5 HYPERLIPIDEMIA, UNSPECIFIED HYPERLIPIDEMIA TYPE: ICD-10-CM

## 2023-10-16 DIAGNOSIS — I10 PRIMARY HYPERTENSION: ICD-10-CM

## 2023-10-16 RX ORDER — BISOPROLOL FUMARATE 5 MG/1
2.5 TABLET, FILM COATED ORAL DAILY
Qty: 90 TABLET | Refills: 1 | Status: SHIPPED | OUTPATIENT
Start: 2023-10-16

## 2023-10-16 RX ORDER — SIMVASTATIN 40 MG
40 TABLET ORAL NIGHTLY
Qty: 90 TABLET | Refills: 1 | Status: SHIPPED | OUTPATIENT
Start: 2023-10-16

## 2023-10-16 NOTE — TELEPHONE ENCOUNTER
----- Message from Belem Acosta sent at 10/16/2023 11:33 AM EDT -----  Regarding: lab results  Contact: [phone]  My 2 med refills were sent to [facility]....they should have gone to [facility].  I only use [facility] for short term scrips....[facility] for all my maintenance prescriptions.  Please change that for me.  Thanks...  Allison

## 2023-10-17 ENCOUNTER — OFFICE VISIT (OUTPATIENT)
Dept: DIABETES SERVICES | Facility: HOSPITAL | Age: 78
End: 2023-10-17
Payer: MEDICARE

## 2023-10-17 ENCOUNTER — OFFICE VISIT (OUTPATIENT)
Dept: NEUROSURGERY | Facility: CLINIC | Age: 78
End: 2023-10-17
Payer: MEDICARE

## 2023-10-17 VITALS
BODY MASS INDEX: 37.76 KG/M2 | DIASTOLIC BLOOD PRESSURE: 73 MMHG | HEIGHT: 63 IN | WEIGHT: 213.1 LBS | SYSTOLIC BLOOD PRESSURE: 145 MMHG

## 2023-10-17 VITALS
HEART RATE: 75 BPM | OXYGEN SATURATION: 94 % | WEIGHT: 217 LBS | DIASTOLIC BLOOD PRESSURE: 70 MMHG | BODY MASS INDEX: 38.45 KG/M2 | HEIGHT: 63 IN | SYSTOLIC BLOOD PRESSURE: 152 MMHG

## 2023-10-17 DIAGNOSIS — E11.9 TYPE 2 DIABETES MELLITUS WITHOUT COMPLICATION, WITH LONG-TERM CURRENT USE OF INSULIN: ICD-10-CM

## 2023-10-17 DIAGNOSIS — E66.9 OBESITY (BMI 30-39.9): Primary | ICD-10-CM

## 2023-10-17 DIAGNOSIS — Z98.890 S/P LUMBAR LAMINECTOMY: Primary | ICD-10-CM

## 2023-10-17 DIAGNOSIS — M48.062 SPINAL STENOSIS OF LUMBAR REGION WITH NEUROGENIC CLAUDICATION: ICD-10-CM

## 2023-10-17 DIAGNOSIS — Z79.4 TYPE 2 DIABETES MELLITUS WITHOUT COMPLICATION, WITH LONG-TERM CURRENT USE OF INSULIN: ICD-10-CM

## 2023-10-17 LAB — GLUCOSE BLDC GLUCOMTR-MCNC: 165 MG/DL (ref 70–99)

## 2023-10-17 PROCEDURE — 3078F DIAST BP <80 MM HG: CPT | Performed by: NEUROLOGICAL SURGERY

## 2023-10-17 PROCEDURE — 82948 REAGENT STRIP/BLOOD GLUCOSE: CPT | Performed by: NURSE PRACTITIONER

## 2023-10-17 PROCEDURE — 3077F SYST BP >= 140 MM HG: CPT | Performed by: NEUROLOGICAL SURGERY

## 2023-10-17 PROCEDURE — G0463 HOSPITAL OUTPT CLINIC VISIT: HCPCS | Performed by: NURSE PRACTITIONER

## 2023-10-17 PROCEDURE — 99024 POSTOP FOLLOW-UP VISIT: CPT | Performed by: NEUROLOGICAL SURGERY

## 2023-10-17 RX ORDER — INSULIN GLARGINE 300 U/ML
40 INJECTION, SOLUTION SUBCUTANEOUS DAILY
Qty: 12 ML | Refills: 1 | Status: SHIPPED | OUTPATIENT
Start: 2023-10-17 | End: 2024-04-14

## 2023-10-17 RX ORDER — DAPAGLIFLOZIN 10 MG/1
1 TABLET, FILM COATED ORAL DAILY
Qty: 90 TABLET | Refills: 1 | Status: SHIPPED | OUTPATIENT
Start: 2023-10-17 | End: 2024-04-14

## 2023-10-17 NOTE — PROGRESS NOTES
Belem Acosta is a 78 y.o. female that presents with Post-op       She is not having any pain into the legs. She feels she is improving with her walking (and it doesn't hurt). She is now about 4 weeks out from surgery.         Review of Systems   Musculoskeletal:  Negative for back pain.        Vitals:    10/17/23 0946   BP: 145/73        Physical Exam  Constitutional:       Appearance: She is obese.   Skin:     Comments: [facility]   Neurological:      Mental Status: She is alert.   Psychiatric:         Mood and Affect: Mood normal.             Assessment and Plan {CC Problem List  Visit Diagnosis  ROS  Review (Popup)  Health Maintenance  Quality  BestPractice  Medications  SmartSets  SnapShot Encounters  Media :23}   Problem List Items Addressed This Visit    None  Visit Diagnoses       S/P lumbar laminectomy    -  Primary    Spinal stenosis of lumbar region with neurogenic claudication                Follow Up {Instructions Charge Capture  Follow-up Communications :23}   No follow-ups on file.

## 2023-10-18 ENCOUNTER — TELEPHONE (OUTPATIENT)
Dept: SLEEP MEDICINE | Facility: HOSPITAL | Age: 78
End: 2023-10-18
Payer: MEDICARE

## 2023-10-18 NOTE — TELEPHONE ENCOUNTER
Likely secondary to Albuterol use    Will switch to Xopenex and monitor on telemetry Spoke with Patient today. Patient is needing cpap supplies. Emailed [facility], to see what do we need to do so patient can receive cpap supplies. Last visit and standing pap therapy order is from Jan, 18, 2023.

## 2023-11-13 ENCOUNTER — TELEPHONE (OUTPATIENT)
Dept: FAMILY MEDICINE CLINIC | Facility: CLINIC | Age: 78
End: 2023-11-13
Payer: MEDICARE

## 2024-01-15 ENCOUNTER — TELEPHONE (OUTPATIENT)
Dept: FAMILY MEDICINE CLINIC | Facility: CLINIC | Age: 79
End: 2024-01-15
Payer: MEDICARE

## 2024-01-15 NOTE — TELEPHONE ENCOUNTER
----- Message from Belem Acosta sent at 1/15/2024 11:45 AM EST -----  Regarding: Medication refill  Contact: [phone]  I did NOT miss an appointment on January 11...Swati did not return my call about scheduling an appointment until 4:37 that afternoon!  That was the office mistake and I would like that removed from My Chart.  I will just wait for my refill until my scheduled appointment in April.    Thanks  Allison Acosta

## 2024-01-15 NOTE — TELEPHONE ENCOUNTER
"Pt has sent two [employer] [facility] messages. \"  I need a new prescription for Alprazolam 0.25MG as listed on my medication list.  Last refill was 3/18/21.  As you can see I do not take this on a regular basis but keep on hand for my panic disorder.  I fill this at [facility].  My next visit with Denia is not until April 12 \"      Alprazolam is not on med list. Pt is requesting refill, please advise. Pt cancelled 01/12 appt.   Please be sure to send message back to clinical pool as well.   "

## 2024-01-16 NOTE — TELEPHONE ENCOUNTER
Spoke w pt and she said that she is going [place] next month and will wait until her appt in April.

## 2024-01-24 NOTE — PROGRESS NOTES
Chief Complaint  Diabetes (Follow up, no devices )    Referred By: BRISSA Ruvalcaba    Subjective          Belem Acosta presents to [facility] for diabetes medication management    History of Present Illness    Visit type:  follow-up  Diabetes type:  Type 2  Current diabetes status/concerns/issues:  Reports she has been well since her last visit. States she ate worse over the holidays. Reports her blood sugars were up and down. States now the holidays are over her blood sugar is running 105-130mg/dl. She just restarted [employer] 2 wks ago.   Other health concerns:  she has back surgery in September but she feels she is back to her baseline. States she feels she may have alpha gal-reports it makes her nauseated if she eats it.   Current Diabetes symptoms:    Polyuria: No   Polydipsia: No   Polyphagia: No   Blurred vision: No   Excessive fatigue: No  Known Diabetes complications:  Neuro: None  Renal: None  Eyes: None She has an eye exam twice yr with  and Dr.Mark Santos.  Amputation/Wounds: None  GI: None  Cardiovascular: HTN, Hyperlipidemia  ED: N/A  Other: None  Hypoglycemia:  None reported at this time  Hypoglycemia Symptoms:  No hypoglycemia at this time  Current diabetes treatment:  Toujeo 40 units qd, Farxiga 10mg qd    Blood glucose device:  Meter  Blood glucose monitoring frequency:  1  Blood glucose range/average:   105-130mg/dl  Glucose Source: Patient Reported  Diet:  Limits high carb/sweet foods, Avoids sugary drinks, Number of meals each day - 3; Number of snacks each day - 1. She restarted Weight [employer] 2 wks ago.   Activity/Exercise:  states she just started walking again. She is walking up and down stairs since the weather has been bad outdoor.    Past Medical History:   Diagnosis Date    Abnormal bone density screening 03/2019    Normal    Ankle pain     Anxiety     Arthritis     Bunion     Cervical disc disorder     Corns and callus     CTS  (carpal tunnel syndrome)     Diabetes mellitus, type 2     Hammertoe     Hyperglycemia     Hyperlipidemia     Hypertension     Hypothyroidism     Impaired fasting glucose 08/19/2014    Low back pain     Lumbosacral disc disease     Panic disorder     Pap smear for cervical cancer screening 2019    NL PER PT SE[place] DR[place] PALOMINO [place]    PONV (postoperative nausea and vomiting)     Screening mammogram, encounter for 05/2020    NORMAL DR. PALOMINO [place]    Sensorineural hearing loss (SNHL) of both ears     Sleep apnea 08/26/2015    Sleep apnea     Thoracic disc disorder      Past Surgical History:   Procedure Laterality Date    CARPAL TUNNEL INJECTION  2002 2004    CARPAL TUNNEL RELEASE      CATARACT EXTRACTION WITH INTRAOCULAR LENS IMPLANT  2012    RIGHT EYE    CHOLECYSTECTOMY  1982    COLONOSCOPY  02/2017    POLYPS, TUBULAR ADENOMA    EPIDURAL BLOCK      LUMBAR LAMINECTOMY Left 9/20/2023    Procedure: MINIMALLY INVASIVE LUMBAR LAMINECTOMY, left approach, lumbar 3-lumbar 4;  Surgeon: Sb Jones MD;  Location: [facility];  Service: Neurosurgery;  Laterality: Left;    ROTATOR CUFF REPAIR  06/2013    Left    THYROIDECTOMY, PARTIAL  1997    Rt thyroid d/t growth-bengin    WRIST SURGERY  2008     Family History   Problem Relation Age of Onset    Heart disease Mother     Heart failure Mother     Hypertension Mother     Diabetes Mother     Arthritis Mother     Hyperlipidemia Mother     Emphysema Father     Heart disease Father     Heart failure Father     COPD Father     Hyperlipidemia Sister     Pancreatic cancer Brother     Hyperlipidemia Brother     Diabetes Brother     Heart disease Maternal Grandmother     Heart disease Maternal Grandfather     Stroke Other     Heart disease Other     Malig Hyperthermia Neg Hx      Social History     Socioeconomic History    Marital status:    Tobacco Use    Smoking status: Never    Smokeless tobacco: Never   Vaping Use    Vaping Use: Never used  "  Substance and Sexual Activity    Alcohol use: Yes     Alcohol/week: 3.0 standard drinks of alcohol     Types: 3 Glasses of wine per week     Comment: Rarely    Drug use: Never    Sexual activity: Not Currently     Partners: Male     Allergies   Allergen Reactions    Metformin Nausea And Vomiting and Unknown - Low Severity    Lisinopril Cough       Current Outpatient Medications:     BD Pen Needle Juana U/F 32G X 4 MM misc, USE 1 PEN NEEDLE DAILY ONCE IN THE MORNING, Disp: 90 each, Rfl: 3    bisoprolol (ZEBeta) 5 MG tablet, Take 0.5 tablets by mouth Daily., Disp: 90 tablet, Rfl: 1    Farxiga 10 MG tablet, Take 10 mg by mouth Daily for 180 days., Disp: 90 tablet, Rfl: 1    latanoprost (XALATAN) 0.005 % ophthalmic solution, Administer 1 drop to both eyes Every Night., Disp: , Rfl:     levothyroxine (SYNTHROID, LEVOTHROID) 112 MCG tablet, TAKE 1 TABLET EVERY MORNING, Disp: 90 tablet, Rfl: 3    PARoxetine (PAXIL) 20 MG tablet, TAKE 1 TABLET EVERY MORNING (Patient taking differently: Take 1 tablet by mouth Every Night.), Disp: 90 tablet, Rfl: 3    simvastatin (ZOCOR) 40 MG tablet, Take 1 tablet by mouth Every Night., Disp: 90 tablet, Rfl: 1    Toujeo Max SoloStar 300 UNIT/ML solution pen-injector injection, Inject 40 Units under the skin into the appropriate area as directed Daily for 180 days., Disp: 12 mL, Rfl: 1    ALPRAZolam (XANAX) 0.25 MG tablet, Take 1 tablet by mouth At Night As Needed for Anxiety. (Patient not taking: Reported on 1/26/2024), Disp: , Rfl:     Objective     Vitals:    01/26/24 0754   BP: 136/76   BP Location: Left arm   Patient Position: Sitting   Cuff Size: Adult   Pulse: 75   SpO2: 96%   Weight: 101 kg (222 lb 9.6 oz)   Height: 160 cm (63\")     Body mass index is 39.43 kg/m².    Physical Exam  Constitutional:       Appearance: Normal appearance. She is obese.      Comments: Obesity (BMI 30 - 39.9) Pt Current BMI = 39.43     HENT:      Head: Normocephalic and atraumatic.      Right Ear: External " ear normal.      Left Ear: External ear normal.      Nose: Nose normal.   Eyes:      Extraocular Movements: Extraocular movements intact.      Conjunctiva/sclera: Conjunctivae normal.   Pulmonary:      Effort: Pulmonary effort is normal.   Musculoskeletal:         General: Normal range of motion.      Cervical back: Normal range of motion.   Skin:     General: Skin is warm and dry.   Neurological:      General: No focal deficit present.      Mental Status: She is alert and oriented to person, place, and time. Mental status is at baseline.   Psychiatric:         Mood and Affect: Mood normal.         Behavior: Behavior normal.         Thought Content: Thought content normal.         Judgment: Judgment normal.             Result Review :   The following data was reviewed by: BRISSA Melvin on 01/26/2024:    Most Recent A1C          1/26/2024    08:02   HGBA1C Most Recent   Hemoglobin A1C 7.1        A1C Last 3 Results          7/11/2023    08:02 10/12/2023    10:26 1/26/2024    08:02   HGBA1C Last 3 Results   Hemoglobin A1C 7.7  7.00  7.1      A1c collected in the office today is 7.1%, indicating Uncontrolled Type II diabetes.  This result is up from the prior result of 7% collected on 10/12/23     Glucose   Date Value Ref Range Status   01/26/2024 107 (H) 70 - 99 mg/dL Final     Comment:     Serial Number: [id number]:  [id number]     Point of care glucose in the office today is within normal limits for nonfasting glucose    Creatinine   Date Value Ref Range Status   10/12/2023 0.52 (L) 0.57 - 1.00 mg/dL Final   09/20/2023 0.53 (L) 0.57 - 1.00 mg/dL Final   07/03/2023 0.50 mg/dL Final     Comment:     Serial Number: [id number]:  [id number]     eGFR   Date Value Ref Range Status   10/12/2023 96.4 >60.0 mL/min/1.73 Final   09/20/2023 96.0 >60.0 mL/min/1.73 Final     Labs collected on 10/12/23 show Normal values    Microalbumin, Urine   Date Value Ref Range Status   01/04/2023 1.3 mg/dL Final     No  "results found for: \"CREATININEUR\"  Microalbumin/Creatinine Ratio   Date Value Ref Range Status   04/01/2021 12.8 0.0 - 35.0 mg/g[Cre] Final   02/21/2020 13.2 0.0 - 35.0 mg/g[Cre] Final     Urine microalbuminuria collected on 1/4/23 is negative for microalbuminuria    Total Cholesterol   Date Value Ref Range Status   10/12/2023 187 0 - 200 mg/dL Final   01/04/2023 184 0 - 200 mg/dL Final     Triglycerides   Date Value Ref Range Status   10/12/2023 166 (H) 0 - 150 mg/dL Final   01/04/2023 159 (H) 0 - 150 mg/dL Final     HDL Cholesterol   Date Value Ref Range Status   10/12/2023 57 40 - 60 mg/dL Final   01/04/2023 57 40 - 60 mg/dL Final     LDL Cholesterol    Date Value Ref Range Status   10/12/2023 101 (H) 0 - 100 mg/dL Final   01/04/2023 100 0 - 100 mg/dL Final     Lipid panel collected on 10/12/23 shows Hyperlipidemia and Hypertriglyceridemia            Assessment: Patient is here today for 3-month follow-up on her diabetes.  She reports has been checking her glucose levels and they have been running 105 to 130 mg/dL.  She states she had a little worse over the holidays and her glucose was a little higher at that time but they have now returned to normal.  According to our scale she has gained 9 pound since her last visit.  States she restarted weight watchers 2 weeks ago.  States she has not been active outdoors due to the weather but she has been walking up and down her stairs at home to remain active.  Her A1c in the office today is 7.1% which is slightly increased from her last A1c of 7% in October.  In addition, she reports she believes she has alpha gal allergy is when she eats red meat it makes her nauseous.  She would like testing to confirm.      Diagnoses and all orders for this visit:    1. Type 2 diabetes mellitus without complication, with long-term current use of insulin (Primary)  -     POC Glycosylated Hemoglobin (Hb A1C)    2. Tick bite of left foot, initial encounter  -     Alpha-Gal IgE Panel; " Future    3. Contact urticaria  -     Alpha-Gal IgE Panel; Future    4. Obesity (BMI 30-39.9)    Other orders  -     POC Glucose        Plan: Ordered an alpha gal IgE panel to assess for alpha gal allergy.  No other changes were made to her visit today.  Scheduled a 3-month follow-up and we will recheck her A1c at that time.    The patient will monitor her blood glucose levels once daily.  If she develops problematic hyperglycemia or hypoglycemia or adverse drug reactions, she will contact the office for further instructions.        Follow Up     Return in about 3 months (around 4/26/2024) for Medication Mgmt.    Patient was given instructions and counseling regarding her condition or for health maintenance advice. Please see specific information pulled into the AVS if appropriate.     Elena Nuñez, BRISSA  01/26/2024      Dictated Utilizing Dragon Dictation.  Please note that portions of this note were completed with a voice recognition program.  Part of this note may be an electronic transcription/translation of spoken language to printed text using the Dragon Dictation System.

## 2024-01-26 ENCOUNTER — OFFICE VISIT (OUTPATIENT)
Dept: DIABETES SERVICES | Facility: HOSPITAL | Age: 79
End: 2024-01-26
Payer: MEDICARE

## 2024-01-26 VITALS
DIASTOLIC BLOOD PRESSURE: 76 MMHG | OXYGEN SATURATION: 96 % | HEIGHT: 63 IN | HEART RATE: 75 BPM | SYSTOLIC BLOOD PRESSURE: 136 MMHG | BODY MASS INDEX: 39.44 KG/M2 | WEIGHT: 222.6 LBS

## 2024-01-26 DIAGNOSIS — E66.9 OBESITY (BMI 30-39.9): ICD-10-CM
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
## 2024-01-26 DIAGNOSIS — S90.862A TICK BITE OF LEFT FOOT, INITIAL ENCOUNTER: ICD-10-CM

## 2024-01-26 DIAGNOSIS — Z79.4 TYPE 2 DIABETES MELLITUS WITHOUT COMPLICATION, WITH LONG-TERM CURRENT USE OF INSULIN: Primary | ICD-10-CM

## 2024-01-26 DIAGNOSIS — L50.6 CONTACT URTICARIA: ICD-10-CM

## 2024-01-26 DIAGNOSIS — E11.9 TYPE 2 DIABETES MELLITUS WITHOUT COMPLICATION, WITH LONG-TERM CURRENT USE OF INSULIN: Primary | ICD-10-CM

## 2024-01-26 LAB
EXPIRATION DATE: ABNORMAL
GLUCOSE BLDC GLUCOMTR-MCNC: 107 MG/DL (ref 70–99)
HBA1C MFR BLD: 7.1 % (ref 4.5–5.7)
Lab: ABNORMAL

## 2024-01-26 PROCEDURE — 99214 OFFICE O/P EST MOD 30 MIN: CPT | Performed by: NURSE PRACTITIONER

## 2024-01-26 PROCEDURE — 3075F SYST BP GE 130 - 139MM HG: CPT | Performed by: NURSE PRACTITIONER

## 2024-01-26 PROCEDURE — 82948 REAGENT STRIP/BLOOD GLUCOSE: CPT | Performed by: NURSE PRACTITIONER

## 2024-01-26 PROCEDURE — G0463 HOSPITAL OUTPT CLINIC VISIT: HCPCS | Performed by: NURSE PRACTITIONER

## 2024-01-26 PROCEDURE — 1160F RVW MEDS BY RX/DR IN RCRD: CPT | Performed by: NURSE PRACTITIONER

## 2024-01-26 PROCEDURE — 3078F DIAST BP <80 MM HG: CPT | Performed by: NURSE PRACTITIONER

## 2024-01-26 PROCEDURE — 1159F MED LIST DOCD IN RCRD: CPT | Performed by: NURSE PRACTITIONER

## 2024-01-26 RX ORDER — ALPRAZOLAM 0.25 MG/1
0.25 TABLET ORAL NIGHTLY PRN
COMMUNITY
Start: 2021-03-18

## 2024-01-29 ENCOUNTER — PATIENT MESSAGE (OUTPATIENT)
Dept: DIABETES SERVICES | Facility: HOSPITAL | Age: 79
End: 2024-01-29
Payer: MEDICARE

## 2024-02-04 DIAGNOSIS — F41.9 ANXIETY: ICD-10-CM

## 2024-02-04 DIAGNOSIS — E03.9 HYPOTHYROIDISM, UNSPECIFIED TYPE: ICD-10-CM

## 2024-02-05 ENCOUNTER — OFFICE VISIT (OUTPATIENT)
Dept: SLEEP MEDICINE | Facility: HOSPITAL | Age: 79
End: 2024-02-05
Payer: MEDICARE

## 2024-02-05 VITALS
HEART RATE: 71 BPM | DIASTOLIC BLOOD PRESSURE: 60 MMHG | SYSTOLIC BLOOD PRESSURE: 133 MMHG | HEIGHT: 63 IN | OXYGEN SATURATION: 95 % | WEIGHT: 217.3 LBS | BODY MASS INDEX: 38.5 KG/M2

## 2024-02-05 DIAGNOSIS — G47.33 OSA ON CPAP: Primary | ICD-10-CM

## 2024-02-05 DIAGNOSIS — E66.9 CLASS 2 OBESITY: ICD-10-CM

## 2024-02-05 PROCEDURE — 99213 OFFICE O/P EST LOW 20 MIN: CPT | Performed by: INTERNAL MEDICINE

## 2024-02-05 PROCEDURE — 3075F SYST BP GE 130 - 139MM HG: CPT | Performed by: INTERNAL MEDICINE

## 2024-02-05 PROCEDURE — G0463 HOSPITAL OUTPT CLINIC VISIT: HCPCS

## 2024-02-05 PROCEDURE — 1159F MED LIST DOCD IN RCRD: CPT | Performed by: INTERNAL MEDICINE

## 2024-02-05 PROCEDURE — 1160F RVW MEDS BY RX/DR IN RCRD: CPT | Performed by: INTERNAL MEDICINE

## 2024-02-05 PROCEDURE — 3078F DIAST BP <80 MM HG: CPT | Performed by: INTERNAL MEDICINE

## 2024-02-05 NOTE — PROGRESS NOTES
"  [address] [address]  Phone: [phone]  Fax: [phone]      SLEEP CLINIC FOLLOW UP PROGRESS NOTE.    Belem Acosta  [id number]   1945  78 y.o.  female      PCP: Oneyda Taylor APRN      Date of visit: 2/5/2024    Chief Complaint   Patient presents with    Sleep Apnea    Obesity       HPI:  This is a 78 y.o. years old patient is here for the management of obstructive sleep apnea.  Sleep apnea is very severe in severity with a AHI of 110/hr. Patient is using positive airway pressure therapy with auto CPAP and the symptoms of sleep apnea have improved significantly on the therapy. Normally patient goes to bed at 10 PM and wakes up at 730 AM .  The patient wakes up 1 time(s) during the night and has no problem going back to sleep.  Feels refreshed after waking up.  She cannot sleep without the CPAP    Medications and allergies are reviewed by me and documented in the encounter.     SOCIAL (habits pertaining to sleep medicine)  History tobacco use:No   History of alcohol use: 0 per week  Caffeine use: 2     REVIEW OF SYSTEMS:   Pertaining positive symptoms are:  [place] Sleepiness Scale :Total score: 6   Nasal congestion      PHYSICAL EXAMINATION:  CONSTITUTIONAL:  Vitals:    02/05/24 0900   BP: 133/60   Pulse: 71   SpO2: 95%   Weight: 98.6 kg (217 lb 4.8 oz)   Height: 160 cm (62.99\")    Body mass index is 38.5 kg/m².   NOSE: nasal passages are clear, No deformities noted   RESP SYSTEM: Not in any respiratory distress, no chest deformities noted,   CARDIOVASULAR: No edema noted  NEURO: Oriented x 3, gait normal,  Mood and affect appeared appropriate      Data reviewed:  The Smart card downloaded on 2/5/2024 has been reviewed independently by me for compliance and discussed the data with the patient.   Compliance; 100%  More than 4 hr use, 100%  Average use of the device 6 hours and 55 minutes per night  Residual AHI: 6 /hr   Mask type: Fullface " mask  Device: DreamStation 2  DME: [employer]      ASSESSMENT AND PLAN:  Obstructive sleep apnea ( G 47.33).  The symptoms of sleep apnea have improved with the device and the treatment.  Patient's compliance with the device is excellent for treatment of sleep apnea.  I have independently reviewed the smart card down load and discussed with the patient the download data and encouarged the patient to continue to use the device.The residual AHI is acceptable. The device is benefiting the patient and the device is medically necessary.  Without proper control of sleep apnea and good compliance there is a increased risk for hypertension, diabetes mellitus and nonrestorative sleep with hypersomnia which can increase risk for motor vehicle accidents.  Untreated sleep apnea is also a risk factor for development of atrial fibrillation, pulmonary hypertension, insulin resistance and stroke. The patient is also instructed to get the supplies from the [employer] company and and change them on a regular basis.  A prescription for supplies has been sent to the [employer] company.  I have also discussed the good sleep hygiene habits and adequate amount of sleep needed for good health.  Obesity  2 with BMI is Body mass index is 38.5 kg/m².. I have discuss the relationship between the weight and sleep apnea. The benefit of weight loss in reducing severity of sleep apnea was discussed. Discussed diet and exercise with the patient to achieve ideal BMI.   Return in about 1 year (around 2/5/2025) for with smart card down load. . Patient's questions were answered.    2/5/2024  Janak Leggett MD  Sleep Medicine.  Medical Director,   [facility] and [place] [facility].

## 2024-02-06 RX ORDER — PAROXETINE HYDROCHLORIDE 20 MG/1
TABLET, FILM COATED ORAL
Qty: 90 TABLET | Refills: 1 | Status: SHIPPED | OUTPATIENT
Start: 2024-02-06

## 2024-02-06 RX ORDER — LEVOTHYROXINE SODIUM 112 UG/1
TABLET ORAL
Qty: 90 TABLET | Refills: 1 | Status: SHIPPED | OUTPATIENT
Start: 2024-02-06

## 2024-04-12 ENCOUNTER — OFFICE VISIT (OUTPATIENT)
Dept: FAMILY MEDICINE CLINIC | Facility: CLINIC | Age: 79
End: 2024-04-12
Payer: MEDICARE

## 2024-04-12 VITALS
DIASTOLIC BLOOD PRESSURE: 66 MMHG | SYSTOLIC BLOOD PRESSURE: 139 MMHG | HEART RATE: 79 BPM | BODY MASS INDEX: 39.27 KG/M2 | WEIGHT: 221.6 LBS | OXYGEN SATURATION: 92 % | HEIGHT: 63 IN

## 2024-04-12 DIAGNOSIS — E11.65 TYPE 2 DIABETES MELLITUS WITH HYPERGLYCEMIA, WITH LONG-TERM CURRENT USE OF INSULIN: ICD-10-CM

## 2024-04-12 DIAGNOSIS — Z79.4 TYPE 2 DIABETES MELLITUS WITH HYPERGLYCEMIA, WITH LONG-TERM CURRENT USE OF INSULIN: ICD-10-CM

## 2024-04-12 DIAGNOSIS — F41.9 ANXIETY: ICD-10-CM

## 2024-04-12 DIAGNOSIS — E78.5 HYPERLIPIDEMIA, UNSPECIFIED HYPERLIPIDEMIA TYPE: ICD-10-CM

## 2024-04-12 DIAGNOSIS — Z23 NEED FOR PNEUMOCOCCAL VACCINATION: ICD-10-CM

## 2024-04-12 DIAGNOSIS — I10 PRIMARY HYPERTENSION: Primary | ICD-10-CM

## 2024-04-12 DIAGNOSIS — E03.9 HYPOTHYROIDISM, UNSPECIFIED TYPE: ICD-10-CM

## 2024-04-12 RX ORDER — SIMVASTATIN 40 MG
40 TABLET ORAL NIGHTLY
Qty: 90 TABLET | Refills: 1 | Status: SHIPPED | OUTPATIENT
Start: 2024-04-12

## 2024-04-12 RX ORDER — BISOPROLOL FUMARATE 5 MG/1
2.5 TABLET, FILM COATED ORAL DAILY
Qty: 90 TABLET | Refills: 1 | Status: SHIPPED | OUTPATIENT
Start: 2024-04-12

## 2024-04-12 RX ORDER — LEVOTHYROXINE SODIUM 112 UG/1
112 TABLET ORAL EVERY MORNING
Qty: 90 TABLET | Refills: 1 | Status: SHIPPED | OUTPATIENT
Start: 2024-04-12

## 2024-04-12 NOTE — PROGRESS NOTES
Chief Complaint  Diabetes, Hypertension, Hyperlipidemia, and Hypothyroidism    SUBJECTIVE  Belem Acosta presents to [facility]    History of Present Illness  Belem Acosta is also being seen today for 6-month follow-up for chronic condition management.      Hypertension-patient is currently on Zebeta 5 mg.  Patient's blood pressure 139/66 in office today.  Patient reports her blood pressure at home usually runs 130s over 60s.  Patient denies any adverse effects of medication.  Requesting refills at this time.     Diabetes type 2-patient is currently taking Farxiga 10 mg and Toujeo.  Patient is followed by diabetes management, BRISSA Carey.  Patient reports doing well medication.  Denies any adverse effects.     Hypothyroidism-patient is currently on levothyroxine 112 mcg.  Reports doing well medication.  Denies any adverse effects.  She is due updated TSH and T4.     Anxiety-patient is currently taking Paxil 20 mg.  Patient reports controls her anxiety well.  Denies any adverse effects.     Hyperlipidemia-patient is currently on Zocor 40 mg.  Patient reports doing well medication.  Denies any adverse effects.  Updated lipid panel today.     Patient is established with sleep medicine doctor.  Patient reports she uses CPAP nightly.     Patient is due labs. Orders placed at today's visit. Discussed with patient that these are fasting labs.      Discussed with patient vaccines that she is eligible for to include pneumonia, RSV. She ia agreeable to pneumonia in office today, she can get RSV at pharmacy.      No other questions or concerns today.         Past Medical History:   Diagnosis Date    Abnormal bone density screening 03/2019    Normal    Ankle pain     Anxiety     Arthritis     Bunion     Cervical disc disorder     Corns and callus     CTS (carpal tunnel syndrome)     Diabetes mellitus, type 2     Hammertoe     Hyperglycemia     Hyperlipidemia     Hypertension      Hypothyroidism     Impaired fasting glucose 08/19/2014    Low back pain     Lumbosacral disc disease     Panic disorder     Pap smear for cervical cancer screening 2019    NL PER PT SE[place] DR. PALOMINO [place]    PONV (postoperative nausea and vomiting)     Screening mammogram, encounter for 05/2020    NORMAL DR. PALOMINO [place]    Sensorineural hearing loss (SNHL) of both ears     Sleep apnea 08/26/2015    Sleep apnea     Thoracic disc disorder       Family History   Problem Relation Age of Onset    Heart disease Mother     Heart failure Mother     Hypertension Mother     Diabetes Mother     Arthritis Mother     Hyperlipidemia Mother     Emphysema Father     Heart disease Father     Heart failure Father     COPD Father     Hyperlipidemia Sister     Pancreatic cancer Brother     Hyperlipidemia Brother     Diabetes Brother     Heart disease Maternal Grandmother     Heart disease Maternal Grandfather     Stroke Other     Heart disease Other     Malig Hyperthermia Neg Hx       Past Surgical History:   Procedure Laterality Date    CARPAL TUNNEL INJECTION  2002 2004    CARPAL TUNNEL RELEASE      CATARACT EXTRACTION WITH INTRAOCULAR LENS IMPLANT  2012    RIGHT EYE    CHOLECYSTECTOMY  1982    COLONOSCOPY  02/2017    POLYPS, TUBULAR ADENOMA    EPIDURAL BLOCK      LUMBAR LAMINECTOMY Left 9/20/2023    Procedure: MINIMALLY INVASIVE LUMBAR LAMINECTOMY, left approach, lumbar 3-lumbar 4;  Surgeon: Sb Jones MD;  Location: [facility];  Service: Neurosurgery;  Laterality: Left;    ROTATOR CUFF REPAIR  06/2013    Left    THYROIDECTOMY, PARTIAL  1997    Rt thyroid d/t growth-bengin    WRIST SURGERY  2008        Current Outpatient Medications:     BD Pen Needle Juana U/F 32G X 4 MM misc, USE 1 PEN NEEDLE DAILY ONCE IN THE MORNING, Disp: 90 each, Rfl: 3    bisoprolol (ZEBeta) 5 MG tablet, Take 0.5 tablets by mouth Daily., Disp: 90 tablet, Rfl: 1    Farxiga 10 MG tablet, Take 10 mg by mouth Daily for 180 days.,  "Disp: 90 tablet, Rfl: 1    latanoprost (XALATAN) 0.005 % ophthalmic solution, Administer 1 drop to both eyes Every Night., Disp: , Rfl:     levothyroxine (SYNTHROID, LEVOTHROID) 112 MCG tablet, Take 1 tablet by mouth Every Morning., Disp: 90 tablet, Rfl: 1    PARoxetine (PAXIL) 20 MG tablet, TAKE 1 TABLET EVERY MORNING, Disp: 90 tablet, Rfl: 1    simvastatin (ZOCOR) 40 MG tablet, Take 1 tablet by mouth Every Night., Disp: 90 tablet, Rfl: 1    Toujeo Max SoloStar 300 UNIT/ML solution pen-injector injection, Inject 40 Units under the skin into the appropriate area as directed Daily for 180 days., Disp: 12 mL, Rfl: 1    OBJECTIVE  Vital Signs:   /66   Pulse 79   Ht 160 cm (63\")   Wt 101 kg (221 lb 9.6 oz)   SpO2 92%   BMI 39.25 kg/m²    Estimated body mass index is 39.25 kg/m² as calculated from the following:    Height as of this encounter: 160 cm (63\").    Weight as of this encounter: 101 kg (221 lb 9.6 oz).     Wt Readings from Last 3 Encounters:   04/12/24 101 kg (221 lb 9.6 oz)   02/05/24 98.6 kg (217 lb 4.8 oz)   01/26/24 101 kg (222 lb 9.6 oz)     BP Readings from Last 3 Encounters:   04/12/24 139/66   02/05/24 133/60   01/26/24 136/76       Physical Exam  Vitals reviewed.   Constitutional:       General: She is not in acute distress.     Appearance: She is not ill-appearing.   HENT:      Head: Normocephalic and atraumatic.   Eyes:      Conjunctiva/sclera: Conjunctivae normal.   Cardiovascular:      Rate and Rhythm: Normal rate and regular rhythm.      Heart sounds: Normal heart sounds.   Pulmonary:      Effort: Pulmonary effort is normal.      Breath sounds: Normal breath sounds.   Musculoskeletal:      Cervical back: Normal range of motion.   Skin:     General: Skin is warm and dry.   Neurological:      Mental Status: She is alert and oriented to person, place, and time.   Psychiatric:         Mood and Affect: Mood normal.         Behavior: Behavior normal.         Thought Content: Thought content " normal.         Judgment: Judgment normal.          Result Review    Common labs          9/20/2023    08:20 10/12/2023    10:26 1/26/2024    08:02   Common Labs   Glucose 131  107     BUN 12  13     Creatinine 0.53  0.52     Sodium 142  143     Potassium 3.7  4.1     Chloride 103  105     Calcium 9.6  10.3     Albumin  4.5     Total Bilirubin  0.4     Alkaline Phosphatase  105     AST (SGOT)  17     ALT (SGPT)  21     WBC  8.43     Hemoglobin  15.1     Hematocrit  45.4     Platelets  417     Total Cholesterol  187     Triglycerides  166     HDL Cholesterol  57     LDL Cholesterol   101     Hemoglobin A1C  7.00  7.1        No Images in the past 120 days found..      The above data has been reviewed by BRISSA Ruvalcaba 04/12/2024 10:02 EDT.          Patient Care Team:  Oneyda Taylor APRN as PCP - General (Nurse Practitioner)  Elena Nuñez APRN as Nurse Practitioner (Nurse Practitioner)           ASSESSMENT & PLAN    Diagnoses and all orders for this visit:    1. Primary hypertension (Primary)  Comments:  Stable on Zebeta 5 mg, continue  Orders:  -     Comprehensive Metabolic Panel; Future  -     CBC & Differential; Future  -     bisoprolol (ZEBeta) 5 MG tablet; Take 0.5 tablets by mouth Daily.  Dispense: 90 tablet; Refill: 1    2. Hyperlipidemia, unspecified hyperlipidemia type  Comments:  Continue Zocor 40 mg, updated lipid panel ordered  Orders:  -     Comprehensive Metabolic Panel; Future  -     Lipid Panel; Future  -     simvastatin (ZOCOR) 40 MG tablet; Take 1 tablet by mouth Every Night.  Dispense: 90 tablet; Refill: 1    3. Type 2 diabetes mellitus with hyperglycemia, with long-term current use of insulin  Comments:  Continue Farxiga and Toujeo, continue to see diabetes management for treatment  Orders:  -     Comprehensive Metabolic Panel; Future  -     Hemoglobin A1c; Future  -     Microalbumin / Creatinine Urine Ratio - Urine, Clean Catch; Future    4. Hypothyroidism, unspecified  type  Comments:  Continue levothyroxine 112 mcg, thyroid panel ordered  Orders:  -     TSH+Free T4; Future  -     levothyroxine (SYNTHROID, LEVOTHROID) 112 MCG tablet; Take 1 tablet by mouth Every Morning.  Dispense: 90 tablet; Refill: 1    5. Anxiety  Comments:  Stable on Paxil 20 mg, continue    6. Need for pneumococcal vaccination  Comments:  given in office today  Orders:  -     Pneumococcal Conjugate Vaccine 20-Valent (PCV20)         Tobacco Use: Low Risk  (4/12/2024)    Patient History     Smoking Tobacco Use: Never     Smokeless Tobacco Use: Never     Passive Exposure: Not on file       Follow Up     Return in about 6 months (around 10/12/2024) for Next scheduled follow up.      Patient was given instructions and counseling regarding her condition or for health maintenance advice. Please see specific information pulled into the AVS if appropriate.   I have reviewed information obtained and documented by others and I have confirmed the accuracy of this documented note.    BRISSA Ruvalcaba

## 2024-04-12 NOTE — LETTER
[facility]  Vaccine Consent Form    Patient Name:  Belem Acosta  Patient :  1945        Screening Checklist  The following questions should be completed prior to vaccination. If you answer “yes” to any question, it does not necessarily mean you should not be vaccinated. It just means we may need to clarify or ask more questions. If a question is unclear, please ask your healthcare provider to explain it.    Yes No   Any fever or moderate to severe illness today (mild illness and/or antibiotic treatment are not contraindications)?     Do you have a history of a serious reaction to any previous vaccinations, such as anaphylaxis, encephalopathy within 7 days, Guillain-Bloomingburg syndrome within 6 weeks, seizure?     Have you received any live vaccine(s) (e.g MMR, EKTA) or any other vaccines in the last month (to ensure duplicate doses aren't given)?     Do you have an anaphylactic allergy to latex (DTaP, DTaP-IPV, Hep A, Hep B, MenB, RV, Td, Tdap), baker’s yeast (Hep B, HPV), polysorbates (RSV, nirsevimab, PCV 20, Rotavirrus, Tdap, Shingrix), or gelatin (EKTA, MMR)?     Do you have an anaphylactic allergy to neomycin (Rabies, EKTA, MMR, IPV, Hep A), polymyxin B (IPV), or streptomycin (IPV)?      Any cancer, leukemia, AIDS, or other immune system disorder? (EKTA, MMR, RV)     Do you have a parent, brother, or sister with an immune system problem (if immune competence of vaccine recipient clinically verified, can proceed)? (MMR, EKTA)     Any recent steroid treatments for >2 weeks, chemotherapy, or radiation treatment? (EKTA, MMR)     Have you received antibody-containing blood transfusions or IVIG in the past 11 months (recommended interval is dependent on product)? (MMR, EKTA)     Have you taken antiviral drugs (acyclovir, famciclovir, valacyclovir for EKTA) in the last 24 or 48 hours, respectively?      Are you pregnant or planning to become pregnant within 1 month? (EKTA, MMR, HPV, IPV, MenB, Abrexvy; For Hep B-  "refer to Engerix-B; For RSV - Abrysvo is indicated for 32-36 weeks of pregnancy from September to January)     For infants, have you ever been told your child has had intussusception or a medical emergency involving obstruction of the intestine (Rotavirus)? If not for an infant, can skip this question.         *Ordering Physicians/APC should be consulted if \"yes\" is checked by the patient or guardian above.  I have received, read, and understand the Vaccine Information Statement (VIS) for each vaccine ordered.  I have considered my or my child's health status as well as the health status of my close contacts.  I have taken the opportunity to discuss my vaccine questions with my or my child's health care provider.   I have requested that the ordered vaccine(s) be given to me or my child.  I understand the benefits and risks of the vaccines.  I understand that I should remain in the clinic for 15 minutes after receiving the vaccine(s).  _________________________________________________________  Signature of Patient or Parent/Legal Guardian ____________________  Date     "

## 2024-04-16 ENCOUNTER — LAB (OUTPATIENT)
Dept: LAB | Facility: HOSPITAL | Age: 79
End: 2024-04-16
Payer: MEDICARE
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
## 2024-04-16 DIAGNOSIS — E11.65 TYPE 2 DIABETES MELLITUS WITH HYPERGLYCEMIA, WITH LONG-TERM CURRENT USE OF INSULIN: ICD-10-CM

## 2024-04-16 DIAGNOSIS — L50.6 CONTACT URTICARIA: ICD-10-CM

## 2024-04-16 DIAGNOSIS — E78.5 HYPERLIPIDEMIA, UNSPECIFIED HYPERLIPIDEMIA TYPE: ICD-10-CM

## 2024-04-16 DIAGNOSIS — I10 PRIMARY HYPERTENSION: ICD-10-CM

## 2024-04-16 DIAGNOSIS — E03.9 HYPOTHYROIDISM, UNSPECIFIED TYPE: ICD-10-CM

## 2024-04-16 DIAGNOSIS — S90.862A TICK BITE OF LEFT FOOT, INITIAL ENCOUNTER: ICD-10-CM

## 2024-04-16 DIAGNOSIS — Z79.4 TYPE 2 DIABETES MELLITUS WITH HYPERGLYCEMIA, WITH LONG-TERM CURRENT USE OF INSULIN: ICD-10-CM

## 2024-04-16 LAB
ALBUMIN SERPL-MCNC: 4.3 G/DL (ref 3.5–5.2)
ALBUMIN UR-MCNC: <1.2 MG/DL
ALBUMIN/GLOB SERPL: 1.6 G/DL
ALP SERPL-CCNC: 101 U/L (ref 39–117)
ALT SERPL W P-5'-P-CCNC: 19 U/L (ref 1–33)
ANION GAP SERPL CALCULATED.3IONS-SCNC: 10 MMOL/L (ref 5–15)
AST SERPL-CCNC: 15 U/L (ref 1–32)
BASOPHILS # BLD AUTO: 0.05 10*3/MM3 (ref 0–0.2)
BASOPHILS NFR BLD AUTO: 0.6 % (ref 0–1.5)
BILIRUB SERPL-MCNC: 0.3 MG/DL (ref 0–1.2)
BUN SERPL-MCNC: 12 MG/DL (ref 8–23)
BUN/CREAT SERPL: 16.7 (ref 7–25)
CALCIUM SPEC-SCNC: 9.5 MG/DL (ref 8.6–10.5)
CHLORIDE SERPL-SCNC: 105 MMOL/L (ref 98–107)
CHOLEST SERPL-MCNC: 167 MG/DL (ref 0–200)
CO2 SERPL-SCNC: 27 MMOL/L (ref 22–29)
CREAT SERPL-MCNC: 0.72 MG/DL (ref 0.57–1)
CREAT UR-MCNC: 79.6 MG/DL
DEPRECATED RDW RBC AUTO: 46.3 FL (ref 37–54)
EGFRCR SERPLBLD CKD-EPI 2021: 86.8 ML/MIN/1.73
EOSINOPHIL # BLD AUTO: 0.16 10*3/MM3 (ref 0–0.4)
EOSINOPHIL NFR BLD AUTO: 2 % (ref 0.3–6.2)
ERYTHROCYTE [DISTWIDTH] IN BLOOD BY AUTOMATED COUNT: 14.4 % (ref 12.3–15.4)
GLOBULIN UR ELPH-MCNC: 2.7 GM/DL
GLUCOSE SERPL-MCNC: 136 MG/DL (ref 65–99)
HBA1C MFR BLD: 7.5 % (ref 4.8–5.6)
HCT VFR BLD AUTO: 44.9 % (ref 34–46.6)
HDLC SERPL-MCNC: 64 MG/DL (ref 40–60)
HGB BLD-MCNC: 14.5 G/DL (ref 12–15.9)
IMM GRANULOCYTES # BLD AUTO: 0.02 10*3/MM3 (ref 0–0.05)
IMM GRANULOCYTES NFR BLD AUTO: 0.2 % (ref 0–0.5)
LDLC SERPL CALC-MCNC: 80 MG/DL (ref 0–100)
LDLC/HDLC SERPL: 1.19 {RATIO}
LYMPHOCYTES # BLD AUTO: 3.18 10*3/MM3 (ref 0.7–3.1)
LYMPHOCYTES NFR BLD AUTO: 39.3 % (ref 19.6–45.3)
MCH RBC QN AUTO: 28.2 PG (ref 26.6–33)
MCHC RBC AUTO-ENTMCNC: 32.3 G/DL (ref 31.5–35.7)
MCV RBC AUTO: 87.4 FL (ref 79–97)
MICROALBUMIN/CREAT UR: NORMAL MG/G{CREAT}
MONOCYTES # BLD AUTO: 0.52 10*3/MM3 (ref 0.1–0.9)
MONOCYTES NFR BLD AUTO: 6.4 % (ref 5–12)
NEUTROPHILS NFR BLD AUTO: 4.17 10*3/MM3 (ref 1.7–7)
NEUTROPHILS NFR BLD AUTO: 51.5 % (ref 42.7–76)
NRBC BLD AUTO-RTO: 0 /100 WBC (ref 0–0.2)
PLATELET # BLD AUTO: 309 10*3/MM3 (ref 140–450)
PMV BLD AUTO: 9.2 FL (ref 6–12)
POTASSIUM SERPL-SCNC: 4.5 MMOL/L (ref 3.5–5.2)
PROT SERPL-MCNC: 7 G/DL (ref 6–8.5)
RBC # BLD AUTO: 5.14 10*6/MM3 (ref 3.77–5.28)
SODIUM SERPL-SCNC: 142 MMOL/L (ref 136–145)
T4 FREE SERPL-MCNC: 1.42 NG/DL (ref 0.93–1.7)
TRIGL SERPL-MCNC: 133 MG/DL (ref 0–150)
TSH SERPL DL<=0.05 MIU/L-ACNC: 1.56 UIU/ML (ref 0.27–4.2)
VLDLC SERPL-MCNC: 23 MG/DL (ref 5–40)
WBC NRBC COR # BLD AUTO: 8.1 10*3/MM3 (ref 3.4–10.8)

## 2024-04-16 PROCEDURE — 36415 COLL VENOUS BLD VENIPUNCTURE: CPT

## 2024-04-16 PROCEDURE — 86003 ALLG SPEC IGE CRUDE XTRC EA: CPT

## 2024-04-16 PROCEDURE — 84443 ASSAY THYROID STIM HORMONE: CPT

## 2024-04-16 PROCEDURE — 80061 LIPID PANEL: CPT

## 2024-04-16 PROCEDURE — 80053 COMPREHEN METABOLIC PANEL: CPT

## 2024-04-16 PROCEDURE — 85025 COMPLETE CBC W/AUTO DIFF WBC: CPT

## 2024-04-16 PROCEDURE — 86008 ALLG SPEC IGE RECOMB EA: CPT

## 2024-04-16 PROCEDURE — 82785 ASSAY OF IGE: CPT

## 2024-04-16 PROCEDURE — 83036 HEMOGLOBIN GLYCOSYLATED A1C: CPT

## 2024-04-16 PROCEDURE — 82570 ASSAY OF URINE CREATININE: CPT

## 2024-04-16 PROCEDURE — 84439 ASSAY OF FREE THYROXINE: CPT

## 2024-04-16 PROCEDURE — 82043 UR ALBUMIN QUANTITATIVE: CPT

## 2024-04-24 LAB
ALPHA-GAL IGE QN: 0.65 KU/L
BEEF IGE QN: <0.1 KU/L
CONV CLASS DESCRIPTION: ABNORMAL
IGE SERPL-ACNC: 22 IU/ML (ref 6–495)
LAMB IGE QN: <0.1 KU/L
PORK IGE QN: <0.1 KU/L

## 2024-04-26 ENCOUNTER — OFFICE VISIT (OUTPATIENT)
Dept: DIABETES SERVICES | Facility: HOSPITAL | Age: 79
End: 2024-04-26
Payer: MEDICARE

## 2024-04-26 VITALS
OXYGEN SATURATION: 99 % | TEMPERATURE: 98.6 F | HEIGHT: 63 IN | BODY MASS INDEX: 39.48 KG/M2 | WEIGHT: 222.8 LBS | SYSTOLIC BLOOD PRESSURE: 150 MMHG | DIASTOLIC BLOOD PRESSURE: 66 MMHG | HEART RATE: 66 BPM

## 2024-04-26 DIAGNOSIS — E11.9 TYPE 2 DIABETES MELLITUS WITHOUT COMPLICATION, WITH LONG-TERM CURRENT USE OF INSULIN: ICD-10-CM

## 2024-04-26 DIAGNOSIS — E66.9 OBESITY (BMI 30-39.9): Primary | ICD-10-CM

## 2024-04-26 DIAGNOSIS — Z79.4 TYPE 2 DIABETES MELLITUS WITHOUT COMPLICATION, WITH LONG-TERM CURRENT USE OF INSULIN: ICD-10-CM

## 2024-04-26 LAB — GLUCOSE BLDC GLUCOMTR-MCNC: 125 MG/DL (ref 70–99)

## 2024-04-26 PROCEDURE — 3077F SYST BP >= 140 MM HG: CPT | Performed by: NURSE PRACTITIONER

## 2024-04-26 PROCEDURE — 3078F DIAST BP <80 MM HG: CPT | Performed by: NURSE PRACTITIONER

## 2024-04-26 PROCEDURE — G0463 HOSPITAL OUTPT CLINIC VISIT: HCPCS | Performed by: NURSE PRACTITIONER

## 2024-04-26 PROCEDURE — 1160F RVW MEDS BY RX/DR IN RCRD: CPT | Performed by: NURSE PRACTITIONER

## 2024-04-26 PROCEDURE — 99213 OFFICE O/P EST LOW 20 MIN: CPT | Performed by: NURSE PRACTITIONER

## 2024-04-26 PROCEDURE — 82948 REAGENT STRIP/BLOOD GLUCOSE: CPT | Performed by: NURSE PRACTITIONER

## 2024-04-26 PROCEDURE — 1159F MED LIST DOCD IN RCRD: CPT | Performed by: NURSE PRACTITIONER

## 2024-04-26 RX ORDER — DAPAGLIFLOZIN 10 MG/1
1 TABLET, FILM COATED ORAL DAILY
Qty: 90 TABLET | Refills: 1 | Status: SHIPPED | OUTPATIENT
Start: 2024-04-26 | End: 2024-10-23

## 2024-04-26 RX ORDER — INSULIN GLARGINE 300 U/ML
44 INJECTION, SOLUTION SUBCUTANEOUS DAILY
Qty: 15 ML | Refills: 1 | Status: SHIPPED | OUTPATIENT
Start: 2024-04-26 | End: 2024-11-17

## 2024-04-26 NOTE — PROGRESS NOTES
Chief Complaint  Diabetes (Med Mgt)    Referred By: BRISSA Ruvalcaba    Subjective          Belem Acosta presents to [facility] for diabetes medication management    History of Present Illness    Visit type:  follow-up  Diabetes type:  Type 2  Current diabetes status/concerns/issues:  States her fbs is running 110-140mg/dl. States her blood sugars have been a little higher lately because she has been on multiple vacations and eating out a lot.   Other health concerns: States her boyfriend has MSA and his organs are starting to shut down. States normally people live 7 years after diagnosis and it has been 7 years. She had her alpha gal lab done and she was positive for alpha gal. States she has avoided red meat and gelatin for about a year. States she was bit by a lone star tick last summer. States she started getting nauseated after eating red meat soon after the tick bite. hospitals she bought herself an adjustible bed which has made a world of difference.   Current Diabetes symptoms:    Polyuria: No   Polydipsia: No   Polyphagia: No   Blurred vision: No   Excessive fatigue: No  Known Diabetes complications:  Neuro: None  Renal: None  Eyes: None She has an eye exam twice yr with  and Dr.Mark Santos.  Amputation/Wounds: None  GI: None  Cardiovascular: HTN, Hyperlipidemia  ED: N/A  Other: None  Hypoglycemia:  None reported at this time  Hypoglycemia Symptoms:  No hypoglycemia at this time  Current diabetes treatment:  Toujeo 44 units qd, Farxiga 10mg qd    Blood glucose device:  Meter  Blood glucose monitoring frequency:  1  Blood glucose range/average:   110-140mg/dl  Glucose Source: Patient Reported  Diet:  Limits high carb/sweet foods, Avoids sugary drinks, Number of meals each day - 3; Number of snacks each day - 1. She restarted [employer] 2 wks ago.   Activity/Exercise:  states she just started walking again. She is walking up and down stairs since the weather  has been bad outdoor    Past Medical History:   Diagnosis Date    Abnormal bone density screening 03/2019    Normal    Ankle pain     Anxiety     Arthritis     Bunion     Cervical disc disorder     Corns and callus     CTS (carpal tunnel syndrome)     Diabetes mellitus, type 2     Hammertoe     Hyperglycemia     Hyperlipidemia     Hypertension     Hypothyroidism     Impaired fasting glucose 08/19/2014    Low back pain     Lumbosacral disc disease     Panic disorder     Pap smear for cervical cancer screening 2019    [place] PER PT SE[place] DR[place] PALOMINO [place]    PONV (postoperative nausea and vomiting)     Screening mammogram, encounter for 05/2020    NORMAL DR. PALOMINO [place]    Sensorineural hearing loss (SNHL) of both ears     Sleep apnea 08/26/2015    Sleep apnea     Thoracic disc disorder      Past Surgical History:   Procedure Laterality Date    CARPAL TUNNEL INJECTION  2002 2004    CARPAL TUNNEL RELEASE      CATARACT EXTRACTION WITH INTRAOCULAR LENS IMPLANT  2012    RIGHT EYE    CHOLECYSTECTOMY  1982    COLONOSCOPY  02/2017    POLYPS, TUBULAR ADENOMA    EPIDURAL BLOCK      LUMBAR LAMINECTOMY Left 9/20/2023    Procedure: MINIMALLY INVASIVE LUMBAR LAMINECTOMY, left approach, lumbar 3-lumbar 4;  Surgeon: Sb Jones MD;  Location: [facility] OR;  Service: Neurosurgery;  Laterality: Left;    ROTATOR CUFF REPAIR  06/2013    Left    THYROIDECTOMY, PARTIAL  1997    Rt thyroid d/t growth-bengin    WRIST SURGERY  2008     Family History   Problem Relation Age of Onset    Heart disease Mother     Heart failure Mother     Hypertension Mother     Diabetes Mother     Arthritis Mother     Hyperlipidemia Mother     Emphysema Father     Heart disease Father     Heart failure Father     COPD Father     Hyperlipidemia Sister     Pancreatic cancer Brother     Hyperlipidemia Brother     Diabetes Brother     Heart disease Maternal Grandmother     Heart disease Maternal Grandfather     Stroke Other     Heart disease  "Other     Malig Hyperthermia Neg Hx      Social History     Socioeconomic History    Marital status:    Tobacco Use    Smoking status: Never    Smokeless tobacco: Never   Vaping Use    Vaping status: Never Used   Substance and Sexual Activity    Alcohol use: Yes     Alcohol/week: 3.0 standard drinks of alcohol     Types: 3 Glasses of wine per week     Comment: Rarely    Drug use: Never    Sexual activity: Not Currently     Partners: Male     Allergies   Allergen Reactions    Metformin Nausea And Vomiting and Unknown - Low Severity    Lisinopril Cough       Current Outpatient Medications:     BD Pen Needle Juana U/F 32G X 4 MM misc, USE 1 PEN NEEDLE DAILY ONCE IN THE MORNING, Disp: 90 each, Rfl: 3    bisoprolol (ZEBeta) 5 MG tablet, Take 0.5 tablets by mouth Daily., Disp: 90 tablet, Rfl: 1    Farxiga 10 MG tablet, Take 10 mg by mouth Daily for 180 days., Disp: 90 tablet, Rfl: 1    latanoprost (XALATAN) 0.005 % ophthalmic solution, Administer 1 drop to both eyes Every Night., Disp: , Rfl:     levothyroxine (SYNTHROID, LEVOTHROID) 112 MCG tablet, Take 1 tablet by mouth Every Morning., Disp: 90 tablet, Rfl: 1    PARoxetine (PAXIL) 20 MG tablet, TAKE 1 TABLET EVERY MORNING, Disp: 90 tablet, Rfl: 1    simvastatin (ZOCOR) 40 MG tablet, Take 1 tablet by mouth Every Night., Disp: 90 tablet, Rfl: 1    Toujeo Max SoloStar 300 UNIT/ML solution pen-injector injection, Inject 44 Units under the skin into the appropriate area as directed Daily for 205 days., Disp: 15 mL, Rfl: 1    Objective     Vitals:    04/26/24 0821   BP: 150/66   BP Location: Right arm   Patient Position: Sitting   Cuff Size: Adult   Pulse: 66   Temp: 98.6 °F (37 °C)   SpO2: 99%   Weight: 101 kg (222 lb 12.8 oz)   Height: 160 cm (63\")   PainSc: 0-No pain     Body mass index is 39.47 kg/m².    Physical Exam  Constitutional:       Appearance: Normal appearance. She is normal weight. She is obese.      Comments: Obesity (BMI 30 - 39.9) Pt Current BMI = " 39.47     HENT:      Head: Normocephalic and atraumatic.      Right Ear: External ear normal.      Left Ear: External ear normal.      Nose: Nose normal.   Eyes:      Extraocular Movements: Extraocular movements intact.      Conjunctiva/sclera: Conjunctivae normal.   Pulmonary:      Effort: Pulmonary effort is normal.   Musculoskeletal:         General: Normal range of motion.      Cervical back: Normal range of motion.   Skin:     General: Skin is warm and dry.   Neurological:      General: No focal deficit present.      Mental Status: She is alert and oriented to person, place, and time. Mental status is at baseline.   Psychiatric:         Mood and Affect: Mood normal.         Behavior: Behavior normal.         Thought Content: Thought content normal.         Judgment: Judgment normal.             Result Review :   The following data was reviewed by: BRISSA Melvin on 04/26/2024:    Most Recent A1C          4/16/2024    09:21   HGBA1C Most Recent   Hemoglobin A1C 7.50        A1C Last 3 Results          10/12/2023    10:26 1/26/2024    08:02 4/16/2024    09:21   HGBA1C Last 3 Results   Hemoglobin A1C 7.00  7.1  7.50      A1c collected in the office today is 7.5%, indicating Uncontrolled Type II diabetes.  This result is up from the prior result of 7.1% collected on 1/26/24     Glucose   Date Value Ref Range Status   04/26/2024 125 (H) 70 - 99 mg/dL Final     Comment:     Serial Number: [id number]:  [id number]     Point of care glucose in the office today is within normal limits for nonfasting glucose    Creatinine   Date Value Ref Range Status   04/16/2024 0.72 0.57 - 1.00 mg/dL Final   10/12/2023 0.52 (L) 0.57 - 1.00 mg/dL Final   07/03/2023 0.50 mg/dL Final     Comment:     Serial Number: [id number]:  [id number]     eGFR   Date Value Ref Range Status   04/16/2024 86.8 >60.0 mL/min/1.73 Final   10/12/2023 96.4 >60.0 mL/min/1.73 Final     Labs collected on 4/16/24 show Stage II mild (GFR =  60-89mL/min)    Microalbumin, Urine   Date Value Ref Range Status   04/16/2024 <1.2 mg/dL Final   01/04/2023 1.3 mg/dL Final     Creatinine, Urine   Date Value Ref Range Status   04/16/2024 79.6 mg/dL Final     Microalbumin/Creatinine Ratio   Date Value Ref Range Status   04/16/2024   Final     Comment:     Unable to calculate   04/01/2021 12.8 0.0 - 35.0 mg/g[Cre] Final   02/21/2020 13.2 0.0 - 35.0 mg/g[Cre] Final     Urine microalbuminuria collected on 4/16/24 is negative for microalbuminuria    Total Cholesterol   Date Value Ref Range Status   04/16/2024 167 0 - 200 mg/dL Final   10/12/2023 187 0 - 200 mg/dL Final     Triglycerides   Date Value Ref Range Status   04/16/2024 133 0 - 150 mg/dL Final   10/12/2023 166 (H) 0 - 150 mg/dL Final     HDL Cholesterol   Date Value Ref Range Status   04/16/2024 64 (H) 40 - 60 mg/dL Final   10/12/2023 57 40 - 60 mg/dL Final     LDL Cholesterol    Date Value Ref Range Status   04/16/2024 80 0 - 100 mg/dL Final   10/12/2023 101 (H) 0 - 100 mg/dL Final     Lipid panel collected on 4/16/24 shows normal lipid panel            Assessment: Patient is here today for 3-month follow-up on her diabetes.States her fbs is running 110-140mg/dl. States her blood sugars have been a little higher lately because she has been on multiple vacations and eating out a lot.  States she has some stressors in her life as her boyfriend has MSA and is at his last stages.  Her A1c on 4/16/2024 was 7.5% which is up from her previous A1c of 7.1% in January.      Diagnoses and all orders for this visit:    1. Obesity (BMI 30-39.9) (Primary)    2. Type 2 diabetes mellitus without complication, with long-term current use of insulin  -     Toujeo Max SoloStar 300 UNIT/ML solution pen-injector injection; Inject 44 Units under the skin into the appropriate area as directed Daily for 205 days.  Dispense: 15 mL; Refill: 1  -     Farxiga 10 MG tablet; Take 10 mg by mouth Daily for 180 days.  Dispense: 90 tablet;  Refill: 1    Other orders  -     POC Glucose        Plan: No changes were made to her plan of care today.  Scheduled a 3-month follow-up and we will recheck her A1c at that time.  Encouraged patient to work on a low-carb low sugar diet.    The patient will monitor her blood glucose levels once daily.  If she develops problematic hyperglycemia or hypoglycemia or adverse drug reactions, she will contact the office for further instructions.        Follow Up     Return in about 3 months (around 7/26/2024) for Medication Mgmt.    Patient was given instructions and counseling regarding her condition or for health maintenance advice. Please see specific information pulled into the AVS if appropriate.     Elena Nuñez, BRISSA  04/26/2024      Dictated Utilizing Dragon Dictation.  Please note that portions of this note were completed with a voice recognition program.  Part of this note may be an electronic transcription/translation of spoken language to printed text using the Dragon Dictation System.

## 2024-07-29 ENCOUNTER — APPOINTMENT (OUTPATIENT)
Dept: CT IMAGING | Facility: HOSPITAL | Age: 79
End: 2024-07-29
Payer: MEDICARE

## 2024-07-29 ENCOUNTER — HOSPITAL ENCOUNTER (EMERGENCY)
Facility: HOSPITAL | Age: 79
Discharge: HOME | End: 2024-07-29
Attending: EMERGENCY MEDICINE
Payer: MEDICARE

## 2024-07-29 VITALS
BODY MASS INDEX: 39.45 KG/M2 | SYSTOLIC BLOOD PRESSURE: 142 MMHG | DIASTOLIC BLOOD PRESSURE: 107 MMHG | WEIGHT: 222.66 LBS | OXYGEN SATURATION: 95 % | TEMPERATURE: 98.3 F | RESPIRATION RATE: 18 BRPM | HEART RATE: 77 BPM | HEIGHT: 63 IN

## 2024-07-29 DIAGNOSIS — S00.83XA CONTUSION OF FACE, INITIAL ENCOUNTER: Primary | ICD-10-CM

## 2024-07-29 PROCEDURE — 96372 THER/PROPH/DIAG INJ SC/IM: CPT

## 2024-07-29 PROCEDURE — 99284 EMERGENCY DEPT VISIT MOD MDM: CPT

## 2024-07-29 PROCEDURE — 25010000002 KETOROLAC TROMETHAMINE PER 15 MG

## 2024-07-29 PROCEDURE — 70450 CT HEAD/BRAIN W/O DYE: CPT

## 2024-07-29 RX ORDER — KETOROLAC TROMETHAMINE 30 MG/ML
30 INJECTION, SOLUTION INTRAMUSCULAR; INTRAVENOUS ONCE
Status: COMPLETED | OUTPATIENT
Start: 2024-07-29 | End: 2024-07-29

## 2024-07-29 RX ADMIN — KETOROLAC TROMETHAMINE 30 MG: 30 INJECTION, SOLUTION INTRAMUSCULAR; INTRAVENOUS at 15:10

## 2024-07-29 NOTE — ED PROVIDER NOTES
Time: 2:08 PM EDT  Date of encounter:  7/29/2024  Independent Historian/Clinical History and Information was obtained by:   Patient    History is limited by: N/A    Chief Complaint   Patient presents with    Fall     Fell states she landed on elbow and knee on right side, injury to right eye from glasses.          History of Present Illness:  Patient is a 78 y.o. year old female who presents to the emergency department for evaluation after a fall.  Patient states that the foot caught on her floor, and she fell onto her right side.  She impacted her head on the ground.  She denies LOC.  Complains of mild headache.  Abrasion noted to eyebrow as well as subconjunctival hemorrhage.  States she was wearing her glasses that is probably Margarito injuries from.  Also has bruise on right elbow and right knee.  Declines imaging of the knee and elbow.  BRISSA Odonnell    Patient Care Team  Primary Care Provider: Oneyda Taylor APRN    Past Medical History:     Allergies   Allergen Reactions    Metformin Nausea And Vomiting and Unknown - Low Severity    Lisinopril Cough     Past Medical History:   Diagnosis Date    Abnormal bone density screening 03/2019    Normal    Ankle pain     Anxiety     Arthritis     Bunion     Cervical disc disorder     Corns and callus     CTS (carpal tunnel syndrome)     Diabetes mellitus, type 2     Hammertoe     Hyperglycemia     Hyperlipidemia     Hypertension     Hypothyroidism     Impaired fasting glucose 08/19/2014    Low back pain     Lumbosacral disc disease     Panic disorder     Pap smear for cervical cancer screening 2019    [place] PER PT SEES DR[facility] [place]    PONV (postoperative nausea and vomiting)     Screening mammogram, encounter for 05/2020    NORMAL DR. PALOMINO [place]    Sensorineural hearing loss (SNHL) of both ears     Sleep apnea 08/26/2015    Sleep apnea     Thoracic disc disorder      Past Surgical History:   Procedure Laterality Date    CARPAL TUNNEL INJECTION   2002 2004    CARPAL TUNNEL RELEASE      CATARACT EXTRACTION WITH INTRAOCULAR LENS IMPLANT  2012    RIGHT EYE    CHOLECYSTECTOMY  1982    COLONOSCOPY  02/2017    POLYPS, TUBULAR ADENOMA    EPIDURAL BLOCK      LUMBAR LAMINECTOMY Left 9/20/2023    Procedure: MINIMALLY INVASIVE LUMBAR LAMINECTOMY, left approach, lumbar 3-lumbar 4;  Surgeon: Sb Jones MD;  [facility];  Service: Neurosurgery;  Laterality: Left;    ROTATOR CUFF REPAIR  06/2013    Left    THYROIDECTOMY, PARTIAL  1997    Rt thyroid d/t growth-bengin    WRIST SURGERY  2008     Family History   Problem Relation Age of Onset    Heart disease Mother     Heart failure Mother     Hypertension Mother     Diabetes Mother     Arthritis Mother     Hyperlipidemia Mother     Emphysema Father     Heart disease Father     Heart failure Father     COPD Father     Hyperlipidemia Sister     Pancreatic cancer Brother     Hyperlipidemia Brother     Diabetes Brother     Heart disease Maternal Grandmother     Heart disease Maternal Grandfather     Stroke Other     Heart disease Other     Malig Hyperthermia Neg Hx        Home Medications:  Prior to Admission medications    Medication Sig Start Date End Date Taking? Authorizing Provider   amoxicillin (AMOXIL) 875 MG tablet Take 1 tablet by mouth 2 (Two) Times a Day. 5/4/24   Tristin Coleman PA-C   BD Pen Needle Juana U/F 32G X 4 MM misc USE 1 PEN NEEDLE DAILY ONCE IN THE MORNING 7/11/23   Oneyda Taylor APRN   bisoprolol (ZEBeta) 5 MG tablet Take 0.5 tablets by mouth Daily. 4/12/24   Oneyda Taylor APRN   Farxiga 10 MG tablet Take 10 mg by mouth Daily for 180 days. 4/26/24 10/23/24  Elena Nuñez APRN   ipratropium (ATROVENT) 0.06 % nasal spray  5/1/24   Nghia Connolly MD   latanoprost (XALATAN) 0.005 % ophthalmic solution Administer 1 drop to both eyes Every Night. 12/17/22   Nghia Connolly MD   levothyroxine (SYNTHROID, LEVOTHROID) 112 MCG tablet Take 1 tablet by mouth Every  Morning. 4/12/24   Oneyda Taylor APRN   PARoxetine (PAXIL) 20 MG tablet TAKE 1 TABLET EVERY MORNING 2/6/24   Oneyda Taylor APRN   simvastatin (ZOCOR) 40 MG tablet Take 1 tablet by mouth Every Night. 4/12/24   Oneyda Taylor APRN   Sahil Ryan SoloStar 300 UNIT/ML solution pen-injector injection Inject 44 Units under the skin into the appropriate area as directed Daily for 205 days. 4/26/24 11/17/24  Elena Nuñez APRN        Social History:   Social History     Tobacco Use    Smoking status: Never    Smokeless tobacco: Never   Vaping Use    Vaping status: Never Used   Substance Use Topics    Alcohol use: Yes     Alcohol/week: 3.0 standard drinks of alcohol     Types: 3 Glasses of wine per week     Comment: Rarely    Drug use: Never         Review of Systems:  Review of Systems   Skin:  Positive for color change and wound.   Neurological:  Positive for headaches.        Physical Exam:  There were no vitals taken for this visit.        Physical Exam  HENT:      Head: Normocephalic.      Mouth/Throat:      Mouth: Mucous membranes are moist.   Eyes:      Conjunctiva/sclera:      Right eye: Hemorrhage present.      Pupils: Pupils are equal, round, and reactive to light.   Pulmonary:      Effort: Pulmonary effort is normal.   Abdominal:      General: There is no distension.   Musculoskeletal:      Cervical back: Neck supple.   Skin:     General: Skin is warm and dry.   Neurological:      General: No focal deficit present.      Mental Status: She is alert and oriented to person, place, and time.   Psychiatric:         Mood and Affect: Mood normal.         Behavior: Behavior normal.        ***              Procedures:  Procedures      Medical Decision Making:      Comorbidities that affect care:    {Comorbidities that affect care:13655}    External Notes reviewed:    {External Note review (Optional):96700}      The following orders were placed and all results were independently analyzed by me:  No orders of  the defined types were placed in this encounter.      Medications Given in the Emergency Department:  Medications - No data to display     ED Course:    The patient was initially evaluated in the triage area where orders were placed. The patient was later dispositioned by BRISSA Meeks.      The patient was advised to stay for completion of workup which includes but is not limited to communication of labs and radiological results, reassessment and plan. The patient was advised that leaving prior to disposition by a provider could result in critical findings that are not communicated to the patient.          Labs:    Lab Results (last 24 hours)       ** No results found for the last 24 hours. **             Imaging:    No Radiology Exams Resulted Within Past 24 Hours      Differential Diagnosis and Discussion:      {Differentials:45422}    {Independent Review of (Optional):66047}    MDM       {Critical Care:92576}    {SEPSIS RECOGNITION:23625}    Patient Care Considerations:    {Considerations (Optional):26892}      Consultants/Shared Management Plan:    {Shared Management Plan (Optional):38053}    Social Determinants of Health:    {Social Determinants of Health (Optional):62388}      Disposition and Care Coordination:    {Admission consideration:14874}    {Discharge (Optional):82944}    Final diagnoses:   None        ED Disposition       None            This medical record created using voice recognition software.

## 2024-07-29 NOTE — ED PROVIDER NOTES
Time: 3:00 PM EDT  Date of encounter:  7/29/2024  Independent Historian/Clinical History and Information was obtained by:   Patient    History is limited by: N/A    Chief Complaint   Patient presents with    Fall     Fell states she landed on elbow and knee on right side, injury to right eye from glasses.          History of Present Illness:  Patient is a 78 y.o. year old female who presents to the emergency department for evaluation of injury sustained in a fall.  Patient states she was just walking down the hallway when her shoes caused her to trip and she had her hands.  She was unable to catch herself.  Patient states she hit her right side and I.  She states she got a little cut from her glasses, other than that denies injury.  Denies LOC or blood thinner use.    Patient Care Team  Primary Care Provider: Oneyda Taylor APRN    Past Medical History:     Allergies   Allergen Reactions    Metformin Nausea And Vomiting and Unknown - Low Severity    Lisinopril Cough     Past Medical History:   Diagnosis Date    Abnormal bone density screening 03/2019    Normal    Ankle pain     Anxiety     Arthritis     Bunion     Cervical disc disorder     Corns and callus     CTS (carpal tunnel syndrome)     Diabetes mellitus, type 2     [place]ammertoe     Hyperglycemia     Hyperlipidemia     Hypertension     Hypothyroidism     Impaired fasting glucose 08/19/2014    Low back pain     Lumbosacral disc disease     Panic disorder     Pap smear for cervical cancer screening 2019    [place] PER PT SEES DR[place] PALOMINO [place]    PONV (postoperative nausea and vomiting)     Screening mammogram, encounter for 05/2020    NORMAL DR. PALOMINO [place]    Sensorineural hearing loss (SNHL) of both ears     Sleep apnea 08/26/2015    Sleep apnea     Thoracic disc disorder      Past Surgical History:   Procedure Laterality Date    CARPAL TUNNEL INJECTION  2002 2004    CARPAL TUNNEL RELEASE      CATARACT EXTRACTION WITH INTRAOCULAR LENS  IMPLANT  2012    RIGHT EYE    CHOLECYSTECTOMY  1982    COLONOSCOPY  02/2017    POLYPS, TUBULAR ADENOMA    EPIDURAL BLOCK      LUMBAR LAMINECTOMY Left 9/20/2023    Procedure: MINIMALLY INVASIVE LUMBAR LAMINECTOMY, left approach, lumbar 3-lumbar 4;  Surgeon: Sb Jones MD;  Location: [facility] MAIN OR;  Service: Neurosurgery;  Laterality: Left;    ROTATOR CUFF REPAIR  06/2013    Left    THYROIDECTOMY, PARTIAL  1997    Rt thyroid d/t growth-bengin    WRIST SURGERY  2008     Family History   Problem Relation Age of Onset    Heart disease Mother     Heart failure Mother     Hypertension Mother     Diabetes Mother     Arthritis Mother     Hyperlipidemia Mother     Emphysema Father     Heart disease Father     Heart failure Father     COPD Father     Hyperlipidemia Sister     Pancreatic cancer Brother     Hyperlipidemia Brother     Diabetes Brother     Heart disease Maternal Grandmother     Heart disease Maternal Grandfather     Stroke Other     Heart disease Other     Malig Hyperthermia Neg Hx        Home Medications:  Prior to Admission medications    Medication Sig Start Date End Date Taking? Authorizing Provider   BD Pen Needle Juana U/F 32G X 4 MM misc USE 1 PEN NEEDLE DAILY ONCE IN THE MORNING 7/11/23   Oneyda Taylor APRN   bisoprolol (ZEBeta) 5 MG tablet Take 0.5 tablets by mouth Daily. 4/12/24   Oneyda Taylor APRN   Farxiga 10 MG tablet Take 10 mg by mouth Daily for 180 days. 4/26/24 10/23/24  Elena Nuñez APRN   ipratropium (ATROVENT) 0.06 % nasal spray  5/1/24   ProviderNghia MD   latanoprost (XALATAN) 0.005 % ophthalmic solution Administer 1 drop to both eyes Every Night. 12/17/22   Nghia Connolly MD   levothyroxine (SYNTHROID, LEVOTHROID) 112 MCG tablet Take 1 tablet by mouth Every Morning. 4/12/24   Oneyda Taylor APRN   PARoxetine (PAXIL) 20 MG tablet TAKE 1 TABLET EVERY MORNING 2/6/24   Oneyda Taylor APRN   simvastatin (ZOCOR) 40 MG tablet Take 1 tablet by mouth Every Night.  "4/12/24   Oneyda Taylor APRN   Sahil Ryan SoloStar 300 UNIT/ML solution pen-injector injection Inject 44 Units under the skin into the appropriate area as directed Daily for 205 days. 4/26/24 11/17/24  Elena Nuñez APRN   amoxicillin (AMOXIL) 875 MG tablet Take 1 tablet by mouth 2 (Two) Times a Day. 5/4/24 7/29/24  Tristin Coleman PA-C        Social History:   Social History     Tobacco Use    Smoking status: Never    Smokeless tobacco: Never   Vaping Use    Vaping status: Never Used   Substance Use Topics    Alcohol use: Yes     Alcohol/week: 3.0 standard drinks of alcohol     Types: 3 Glasses of wine per week     Comment: Rarely    Drug use: Never         Review of Systems:  Review of Systems   Constitutional: Negative.    Eyes: Negative.    Respiratory: Negative.     Cardiovascular: Negative.    Gastrointestinal: Negative.    Endocrine: Negative.    Genitourinary: Negative.    Musculoskeletal: Negative.    Skin:  Positive for color change and wound.   Allergic/Immunologic: Negative.    Neurological:  Positive for headaches.   Hematological: Negative.    Psychiatric/Behavioral: Negative.          Physical Exam:  BP (!) 142/107 (BP Location: Right arm, Patient Position: Sitting)   Pulse 77   Temp 98.3 °F (36.8 °C) (Oral)   Resp 18   Ht 160 cm (63\")   Wt 101 kg (222 lb 10.6 oz)   SpO2 95%   BMI 39.44 kg/m²         Physical Exam  Vitals and nursing note reviewed.   Constitutional:       General: She is not in acute distress.     Appearance: Normal appearance. She is not toxic-appearing.   HENT:      Head: Normocephalic and atraumatic. Abrasion and contusion present.      Jaw: There is normal jaw occlusion.        Comments: Mild erythema and abrasion noted to the area as marked above.     Right Ear: Tympanic membrane normal.      Left Ear: Tympanic membrane normal.      Nose: Nose normal.      Mouth/Throat:      Mouth: Mucous membranes are moist.   Eyes:      General: Lids are normal.      " Extraocular Movements: Extraocular movements intact.      Conjunctiva/sclera: Conjunctivae normal.      Right eye: Right conjunctiva is injected.      Pupils: Pupils are equal, round, and reactive to light.   Cardiovascular:      Rate and Rhythm: Normal rate and regular rhythm.      Pulses: Normal pulses.      Heart sounds: Normal heart sounds.   Pulmonary:      Effort: Pulmonary effort is normal. No respiratory distress.      Breath sounds: Normal breath sounds. No wheezing or rhonchi.   Abdominal:      General: Abdomen is flat.      Palpations: Abdomen is soft.      Tenderness: There is no abdominal tenderness. There is no guarding or rebound.   Musculoskeletal:         General: Normal range of motion.      Cervical back: Normal range of motion and neck supple.      Right lower leg: No edema.      Left lower leg: No edema.   Skin:     General: Skin is warm and dry.   Neurological:      Mental Status: She is alert and oriented to person, place, and time. Mental status is at baseline.   Psychiatric:         Mood and Affect: Mood normal.                Procedures:  Procedures      Medical Decision Making:      Comorbidities that affect care:    Diabetes, hypertension, anxiety, panic disorder, thyroid disease, arthritis    External Notes reviewed:    Previous Clinic Note: Patient was last seen on 5/4/2024 at urgent care for evaluation of sinusitis.      The following orders were placed and all results were independently analyzed by me:  Orders Placed This Encounter   Procedures    CT Head Without Contrast       Medications Given in the Emergency Department:  Medications   ketorolac (TORADOL) injection 30 mg (30 mg Intramuscular Given 7/29/24 1510)        ED Course:    The patient was initially evaluated in the triage area where orders were placed. The patient was later dispositioned by BRISSA Durbin.      The patient was advised to stay for completion of workup which includes but is not limited to communication  of labs and radiological results, reassessment and plan. The patient was advised that leaving prior to disposition by a provider could result in critical findings that are not communicated to the patient.          Labs:    Lab Results (last 24 hours)       ** No results found for the last 24 hours. **             Imaging:    CT Head Without Contrast    Result Date: 7/29/2024  CT HEAD WO CONTRAST Date of Exam: 7/29/2024 2:21 PM EDT Indication: Fall, head injury. Comparison: None available. Technique: Axial CT images were obtained of the head without contrast administration.  Reconstructed coronal and sagittal images were also obtained. Automated exposure control and iterative construction methods were used. Findings: No intra or extra-axial fluid collections, masses, or areas of hemorrhage are identified. No midline shift or mass effect is identified. Normal gray and white matter differentiation. Ventricles and sulci are age-appropriate. Basal cisterns are patent. Orbits, paranasal sinuses, and mastoid air cells are normal. No aggressive appearing lytic or sclerotic bone lesions.     Impression: 1. No acute intracranial pathology. Electronically Signed: Gasper Burger MD  7/29/2024 2:37 PM EDT  Workstation ID: [id number]       Differential Diagnosis and Discussion:      Headache: Differential diagnosis includes but is not limited to migraine, cluster headache, hypertension, tumor, subarachnoid bleeding, pseudotumor cerebri, temporal arteritis, infections, tension headache, and TMJ syndrome.    CT scan radiology impression was interpreted by me.    MDM  Number of Diagnoses or Management Options  Contusion of face, initial encounter  Diagnosis management comments: The patient presents with an acute closed head injury. [place] Criteria for CT scan was followed. CT of the head is required for patients with minor head injury and any one of the following (including a GCS of 15):     1.                     Headache   2.                      Vomiting   3.                     Older than 60 years   4.                     Drug or Alcohol intoxication   5.                     Persistent anterograde amnesia   6.                     Visible trauma above the clavicle   7.                     Seizure.      The CT scan of the head shows no acute intracranial abnormalities. This includes subarachnoid hemorrhage, subdural hematoma, epidural hematoma, or calvarial fractures. The patient is neurologically intact, has a normal mental status and is ambulatory in the ED. The patient has had no seizure like activity in the ED. The vital signs have been stable. The patient's condition is stable and appropriate for discharge. The patient made aware of the symptoms of post-concussive syndrome. The patient was counseled to return to the ED for motor or sensory deficit, altered mental status, uncontrollable headache, visual changes, seizures, or for any re-examination of new symptoms. The patient will pursue further outpatient evaluation with the primary care physician or other designated or consulting position as indicated in the discharge instructions as a follow up.        Patient Care Considerations:    NARCOTICS: I considered prescribing opiate pain medication as an outpatient, however patient did not have a  and request to only take OTC NSAIDs or acetaminophen.  She reports administered medication has improved her symptoms.      Consultants/Shared Management Plan:    None    Social Determinants of Health:    Patient is independent, reliable, and has access to care.       Disposition and Care Coordination:    Discharged: The patient is suitable and stable for discharge with no need for consideration of admission.    I have explained the patient´s condition, diagnoses and treatment plan based on the information available to me at this time. I have answered questions and addressed any concerns. The patient has a good  understanding of the patient´s  diagnosis, condition, and treatment plan as can be expected at this point. The vital signs have been stable. The patient´s condition is stable and appropriate for discharge from the emergency department.      The patient will pursue further outpatient evaluation with the primary care physician or other designated or consulting physician as outlined in the discharge instructions. They are agreeable to this plan of care and follow-up instructions have been explained in detail. The patient has received these instructions in written format and has expressed an understanding of the discharge instructions. The patient is aware that any significant change in condition or worsening of symptoms should prompt an immediate return to this or the closest emergency department or call to 911.    Final diagnoses:   Contusion of face, initial encounter        ED Disposition       ED Disposition   Discharge    Condition   Stable    Comment   --               This medical record created using voice recognition software.             Megan Ley, APRN  07/29/24 0874

## 2024-07-29 NOTE — DISCHARGE INSTRUCTIONS
Home.  Take Tylenol or ibuprofen for pain relief.  Get plenty of rest tonight.  Increase your fluid intake.  You may apply ice to your eye to decrease the swelling.  The redness inside your eye should go away in the next few days.    Keep your appointment as scheduled with ophthalmology for Wednesday.  You may also call your primary care provider to schedule an appointment for follow-up as needed.    If symptoms worsen, change presentation, or you develop new symptoms such as increased dizziness, confusion, visual changes, or headache that is not relieved by medication please return to the ED for further evaluation or seek medical attention at the nearest emergency department.

## 2024-08-02 DIAGNOSIS — F41.9 ANXIETY: ICD-10-CM

## 2024-08-06 RX ORDER — PAROXETINE HYDROCHLORIDE 20 MG/1
TABLET, FILM COATED ORAL
Qty: 90 TABLET | Refills: 3 | Status: SHIPPED | OUTPATIENT
Start: 2024-08-06

## 2024-09-12 DIAGNOSIS — Z79.4 TYPE 2 DIABETES MELLITUS WITHOUT COMPLICATION, WITH LONG-TERM CURRENT USE OF INSULIN: ICD-10-CM

## 2024-09-12 DIAGNOSIS — E11.9 TYPE 2 DIABETES MELLITUS WITHOUT COMPLICATION, WITH LONG-TERM CURRENT USE OF INSULIN: ICD-10-CM

## 2024-09-13 RX ORDER — PEN NEEDLE, DIABETIC 32GX 5/32"
NEEDLE, DISPOSABLE MISCELLANEOUS
Qty: 90 EACH | Refills: 3 | Status: SHIPPED | OUTPATIENT
Start: 2024-09-13

## 2024-09-18 ENCOUNTER — OFFICE VISIT (OUTPATIENT)
Dept: DIABETES SERVICES | Facility: HOSPITAL | Age: 79
End: 2024-09-18
Payer: MEDICARE

## 2024-09-18 VITALS
OXYGEN SATURATION: 94 % | HEIGHT: 63 IN | WEIGHT: 232.2 LBS | BODY MASS INDEX: 41.14 KG/M2 | HEART RATE: 71 BPM | DIASTOLIC BLOOD PRESSURE: 68 MMHG | SYSTOLIC BLOOD PRESSURE: 152 MMHG

## 2024-09-18 DIAGNOSIS — E66.01 SEVERE OBESITY (BMI >= 40): ICD-10-CM

## 2024-09-18 DIAGNOSIS — E11.65 UNCONTROLLED TYPE 2 DIABETES MELLITUS WITH HYPERGLYCEMIA: Primary | ICD-10-CM

## 2024-09-18 DIAGNOSIS — Z79.4 TYPE 2 DIABETES MELLITUS WITHOUT COMPLICATION, WITH LONG-TERM CURRENT USE OF INSULIN: ICD-10-CM

## 2024-09-18 DIAGNOSIS — E11.65 TYPE 2 DIABETES MELLITUS WITH HYPERGLYCEMIA, WITH LONG-TERM CURRENT USE OF INSULIN: ICD-10-CM

## 2024-09-18 DIAGNOSIS — E11.9 TYPE 2 DIABETES MELLITUS WITHOUT COMPLICATION, WITH LONG-TERM CURRENT USE OF INSULIN: ICD-10-CM

## 2024-09-18 DIAGNOSIS — Z79.4 TYPE 2 DIABETES MELLITUS WITH HYPERGLYCEMIA, WITH LONG-TERM CURRENT USE OF INSULIN: ICD-10-CM

## 2024-09-18 LAB
EXPIRATION DATE: ABNORMAL
GLUCOSE BLDC GLUCOMTR-MCNC: 133 MG/DL (ref 70–99)
HBA1C MFR BLD: 7.6 % (ref 4.5–5.7)
Lab: ABNORMAL

## 2024-09-18 PROCEDURE — G0463 HOSPITAL OUTPT CLINIC VISIT: HCPCS | Performed by: NURSE PRACTITIONER

## 2024-09-18 PROCEDURE — 1160F RVW MEDS BY RX/DR IN RCRD: CPT | Performed by: NURSE PRACTITIONER

## 2024-09-18 PROCEDURE — 82948 REAGENT STRIP/BLOOD GLUCOSE: CPT | Performed by: NURSE PRACTITIONER

## 2024-09-18 PROCEDURE — 3078F DIAST BP <80 MM HG: CPT | Performed by: NURSE PRACTITIONER

## 2024-09-18 PROCEDURE — 1159F MED LIST DOCD IN RCRD: CPT | Performed by: NURSE PRACTITIONER

## 2024-09-18 PROCEDURE — 99214 OFFICE O/P EST MOD 30 MIN: CPT | Performed by: NURSE PRACTITIONER

## 2024-09-18 PROCEDURE — 83036 HEMOGLOBIN GLYCOSYLATED A1C: CPT | Performed by: NURSE PRACTITIONER

## 2024-09-18 PROCEDURE — 3077F SYST BP >= 140 MM HG: CPT | Performed by: NURSE PRACTITIONER

## 2024-09-18 RX ORDER — DAPAGLIFLOZIN 10 MG/1
1 TABLET, FILM COATED ORAL DAILY
Qty: 90 TABLET | Refills: 1 | Status: SHIPPED | OUTPATIENT
Start: 2024-09-18 | End: 2025-03-17

## 2024-09-18 RX ORDER — INSULIN GLARGINE 300 U/ML
47 INJECTION, SOLUTION SUBCUTANEOUS DAILY
Qty: 14.1 ML | Refills: 1 | Status: SHIPPED | OUTPATIENT
Start: 2024-09-18 | End: 2025-03-17

## 2024-09-25 DIAGNOSIS — E11.9 TYPE 2 DIABETES MELLITUS WITHOUT COMPLICATION, WITH LONG-TERM CURRENT USE OF INSULIN: ICD-10-CM

## 2024-09-25 DIAGNOSIS — Z79.4 TYPE 2 DIABETES MELLITUS WITHOUT COMPLICATION, WITH LONG-TERM CURRENT USE OF INSULIN: ICD-10-CM

## 2024-09-25 RX ORDER — INSULIN GLARGINE 300 U/ML
46 INJECTION, SOLUTION SUBCUTANEOUS DAILY
Qty: 15 ML | Refills: 1 | Status: SHIPPED | OUTPATIENT
Start: 2024-09-25 | End: 2025-03-24

## 2024-10-15 ENCOUNTER — OFFICE VISIT (OUTPATIENT)
Dept: FAMILY MEDICINE CLINIC | Facility: CLINIC | Age: 79
End: 2024-10-15
Payer: MEDICARE

## 2024-10-15 VITALS
HEIGHT: 63 IN | OXYGEN SATURATION: 94 % | BODY MASS INDEX: 39.87 KG/M2 | HEART RATE: 78 BPM | WEIGHT: 225 LBS | SYSTOLIC BLOOD PRESSURE: 130 MMHG | DIASTOLIC BLOOD PRESSURE: 65 MMHG

## 2024-10-15 DIAGNOSIS — I10 PRIMARY HYPERTENSION: ICD-10-CM

## 2024-10-15 DIAGNOSIS — Z00.00 MEDICARE ANNUAL WELLNESS VISIT, SUBSEQUENT: Primary | ICD-10-CM

## 2024-10-15 DIAGNOSIS — E78.5 HYPERLIPIDEMIA, UNSPECIFIED HYPERLIPIDEMIA TYPE: ICD-10-CM

## 2024-10-15 DIAGNOSIS — E03.9 HYPOTHYROIDISM, UNSPECIFIED TYPE: ICD-10-CM

## 2024-10-15 PROCEDURE — 1126F AMNT PAIN NOTED NONE PRSNT: CPT

## 2024-10-15 PROCEDURE — 3075F SYST BP GE 130 - 139MM HG: CPT

## 2024-10-15 PROCEDURE — 3078F DIAST BP <80 MM HG: CPT

## 2024-10-15 PROCEDURE — 1170F FXNL STATUS ASSESSED: CPT

## 2024-10-15 PROCEDURE — G0439 PPPS, SUBSEQ VISIT: HCPCS

## 2024-10-15 RX ORDER — LEVOTHYROXINE SODIUM 112 UG/1
112 TABLET ORAL EVERY MORNING
Qty: 90 TABLET | Refills: 1 | Status: SHIPPED | OUTPATIENT
Start: 2024-10-15

## 2024-10-15 RX ORDER — SIMVASTATIN 40 MG
40 TABLET ORAL NIGHTLY
Qty: 90 TABLET | Refills: 1 | Status: SHIPPED | OUTPATIENT
Start: 2024-10-15

## 2024-10-15 RX ORDER — BISOPROLOL FUMARATE 5 MG/1
2.5 TABLET, FILM COATED ORAL DAILY
Qty: 90 TABLET | Refills: 1 | Status: SHIPPED | OUTPATIENT
Start: 2024-10-15

## 2024-10-15 NOTE — PROGRESS NOTES
Subjective   The ABCs of the Annual Wellness Visit  Medicare Wellness Visit      Belem Acosta is a 79 y.o. patient who presents for a Medicare Wellness Visit.    The following portions of the patient's history were reviewed and   updated as appropriate: She  has a past medical history of Abnormal bone density screening (03/2019), Ankle pain, Anxiety, Arthritis, Bunion, Cervical disc disorder, Corns and callus, CTS (carpal tunnel syndrome), Diabetes mellitus, type 2, Hammertoe, Hyperglycemia, Hyperlipidemia, Hypertension, Hypothyroidism, Impaired fasting glucose (08/19/2014), Low back pain, Lumbosacral disc disease, Panic disorder, Pap smear for cervical cancer screening (2019), PONV (postoperative nausea and vomiting), Screening mammogram, encounter for (05/2020), Sensorineural hearing loss (SNHL) of both ears, Sleep apnea (08/26/2015), Sleep apnea, and Thoracic disc disorder.  She does not have any pertinent problems on file.  She  has a past surgical history that includes CARPAL TUNNEL INJECTION (2002 2004); Cataract extraction w/  intraocular lens implant (2012); Cholecystectomy (1982); Colonoscopy (02/2017); Rotator cuff repair (06/2013); Thyroidectomy, partial (1997); Wrist surgery (2008); Carpal tunnel release; Epidural block injection; and Lumbar laminectomy (Left, 9/20/2023).  Her family history includes Arthritis in her mother; COPD in her father; Diabetes in her brother and mother; Emphysema in her father; Heart disease in her father, maternal grandfather, maternal grandmother, mother, and another family member; Heart failure in her father and mother; Hyperlipidemia in her brother, mother, and sister; Hypertension in her mother; Pancreatic cancer in her brother; Stroke in an other family member.  She  reports that she has never smoked. She has never been exposed to tobacco smoke. She has never used smokeless tobacco. She reports current alcohol use of about 3.0 standard drinks of alcohol per week.  She reports that she does not use drugs.  Current Outpatient Medications   Medication Sig Dispense Refill    BD Pen Needle Juana 2nd Gen 32G X 4 MM misc USE 1 PEN NEEDLE DAILY ONCE IN THE MORNING 90 each 3    bisoprolol (ZEBeta) 5 MG tablet Take 0.5 tablets by mouth Daily. 90 tablet 1    Farxiga 10 MG tablet Take 10 mg by mouth Daily for 180 days. 90 tablet 1    ipratropium (ATROVENT) 0.06 % nasal spray       latanoprost (XALATAN) 0.005 % ophthalmic solution Administer 1 drop to both eyes Every Night.      levothyroxine (SYNTHROID, LEVOTHROID) 112 MCG tablet Take 1 tablet by mouth Every Morning. 90 tablet 1    PARoxetine (PAXIL) 20 MG tablet TAKE 1 TABLET EVERY MORNING 90 tablet 3    simvastatin (ZOCOR) 40 MG tablet Take 1 tablet by mouth Every Night. 90 tablet 1    Toujeo Max SoloStar 300 UNIT/ML solution pen-injector injection Inject 46 Units under the skin into the appropriate area as directed Daily for 180 days. 15 mL 1     No current facility-administered medications for this visit.     Current Outpatient Medications on File Prior to Visit   Medication Sig    BD Pen Needle Juana 2nd Gen 32G X 4 MM misc USE 1 PEN NEEDLE DAILY ONCE IN THE MORNING    Farxiga 10 MG tablet Take 10 mg by mouth Daily for 180 days.    ipratropium (ATROVENT) 0.06 % nasal spray     latanoprost (XALATAN) 0.005 % ophthalmic solution Administer 1 drop to both eyes Every Night.    PARoxetine (PAXIL) 20 MG tablet TAKE 1 TABLET EVERY MORNING    Toujeo Max SoloStar 300 UNIT/ML solution pen-injector injection Inject 46 Units under the skin into the appropriate area as directed Daily for 180 days.    [DISCONTINUED] bisoprolol (ZEBeta) 5 MG tablet Take 0.5 tablets by mouth Daily.    [DISCONTINUED] levothyroxine (SYNTHROID, LEVOTHROID) 112 MCG tablet Take 1 tablet by mouth Every Morning.    [DISCONTINUED] simvastatin (ZOCOR) 40 MG tablet Take 1 tablet by mouth Every Night.    [DISCONTINUED] SITagliptin (Januvia) 25 MG tablet Take 1 tablet by mouth  Daily for 180 days. (Patient not taking: Reported on 10/15/2024)     No current facility-administered medications on file prior to visit.     She is allergic to metformin and lisinopril..    Compared to one year ago, the patient's physical   health is better.  Compared to one year ago, the patient's mental   health is the same.    Recent Hospitalizations:  She was not admitted to the hospital during the last year.     Current Medical Providers:  Patient Care Team:  Oneyda Taylor APRN as PCP - General (Nurse Practitioner)  Elena Nuñez APRN as Nurse Practitioner (Nurse Practitioner)  Janak Leggett MD as Consulting Physician (Sleep Medicine)    Outpatient Medications Prior to Visit   Medication Sig Dispense Refill    BD Pen Needle Juana 2nd Gen 32G X 4 MM misc USE 1 PEN NEEDLE DAILY ONCE IN THE MORNING 90 each 3    Farxiga 10 MG tablet Take 10 mg by mouth Daily for 180 days. 90 tablet 1    ipratropium (ATROVENT) 0.06 % nasal spray       latanoprost (XALATAN) 0.005 % ophthalmic solution Administer 1 drop to both eyes Every Night.      PARoxetine (PAXIL) 20 MG tablet TAKE 1 TABLET EVERY MORNING 90 tablet 3    Toujeo Max SoloStar 300 UNIT/ML solution pen-injector injection Inject 46 Units under the skin into the appropriate area as directed Daily for 180 days. 15 mL 1    bisoprolol (ZEBeta) 5 MG tablet Take 0.5 tablets by mouth Daily. 90 tablet 1    levothyroxine (SYNTHROID, LEVOTHROID) 112 MCG tablet Take 1 tablet by mouth Every Morning. 90 tablet 1    simvastatin (ZOCOR) 40 MG tablet Take 1 tablet by mouth Every Night. 90 tablet 1    SITagliptin (Januvia) 25 MG tablet Take 1 tablet by mouth Daily for 180 days. (Patient not taking: Reported on 10/15/2024) 90 tablet 1     No facility-administered medications prior to visit.     No opioid medication identified on active medication list. I have reviewed chart for other potential  high risk medication/s and harmful drug interactions in the  "elderly.      Aspirin is not on active medication list.  Aspirin use is not indicated based on review of current medical condition/s. Risk of harm outweighs potential benefits.  .    Patient Active Problem List   Diagnosis    Arthritis    Diabetes mellitus, type II    Bunion    Hammertoe    Hyperlipidemia    Hypertension    Hypothyroidism    Sensorineural hearing loss (SNHL) of left ear    MANGO on CPAP    Class 2 obesity    Anxiety     Advance Care Planning Advance Directive is not on file.  ACP discussion was held with the patient during this visit. Patient has an advance directive (not in EMR), copy requested.            Objective   Vitals:    10/15/24 1034 10/15/24 1056   BP: 154/62 130/65  Comment: home reading   BP Location: Left arm    Patient Position: Sitting    Cuff Size: Large Adult    Pulse: 78    SpO2: 94%    Weight: 102 kg (225 lb)    Height: 160 cm (63\")        Estimated body mass index is 39.86 kg/m² as calculated from the following:    Height as of this encounter: 160 cm (63\").    Weight as of this encounter: 102 kg (225 lb).            Does the patient have evidence of cognitive impairment? No  Lab Results   Component Value Date    HGBA1C 7.6 (A) 09/18/2024                                                                                                Health  Risk Assessment    Smoking Status:  Social History     Tobacco Use   Smoking Status Never    Passive exposure: Never   Smokeless Tobacco Never     Alcohol Consumption:  Social History     Substance and Sexual Activity   Alcohol Use Yes    Alcohol/week: 3.0 standard drinks of alcohol    Types: 3 Glasses of wine per week    Comment: Rarely       Fall Risk Screen  STEADI Fall Risk Assessment was completed, and patient is at LOW risk for falls.Assessment completed on:10/15/2024    Depression Screening:      10/15/2024    10:00 AM   PHQ-2/PHQ-9 Depression Screening   Little interest or pleasure in doing things Not at all   Feeling down, depressed, or " hopeless Not at all     Health Habits and Functional and Cognitive Screening:      10/15/2024    10:00 AM   Functional & Cognitive Status   Do you have difficulty preparing food and eating? No   Do you have difficulty bathing yourself, getting dressed or grooming yourself? No   Do you have difficulty using the toilet? No   Do you have difficulty moving around from place to place? No   Do you have trouble with steps or getting out of a bed or a chair? No   Current Diet Well Balanced Diet   Dental Exam Up to date   Eye Exam Up to date   Exercise (times per week) 3 times per week   Current Exercises Include House Cleaning   Do you need help using the phone?  No   Are you deaf or do you have serious difficulty hearing?  No   Do you need help to go to places out of walking distance? No   Do you need help shopping? No   Do you need help preparing meals?  No   Do you need help with housework?  No   Do you need help with laundry? No   Do you need help taking your medications? No   Do you need help managing money? No   Do you ever drive or ride in a car without wearing a seat belt? No   Have you felt unusual stress, anger or loneliness in the last month? No   Who do you live with? Spouse   If you need help, do you have trouble finding someone available to you? No   Have you been bothered in the last four weeks by sexual problems? No   Do you have difficulty concentrating, remembering or making decisions? No           Age-appropriate Screening Schedule:  Refer to the list below for future screening recommendations based on patient's age, sex and/or medical conditions. Orders for these recommended tests are listed in the plan section. The patient has been provided with a written plan.    Health Maintenance List  Health Maintenance   Topic Date Due    ANNUAL WELLNESS VISIT  10/12/2024    DXA SCAN  10/15/2024 (Originally 6/14/2023)    ZOSTER VACCINE (1 of 2) 11/01/2024 (Originally 8/4/1997)    INFLUENZA VACCINE  03/31/2025  (Originally 8/1/2024)    RSV Vaccine - Adults (1 - 1-dose 75+ series) 04/12/2025 (Originally 8/4/2022)    COVID-19 Vaccine (5 - 2023-24 season) 10/15/2025 (Originally 9/1/2024)    HEMOGLOBIN A1C  03/18/2025    BMI FOLLOWUP  04/12/2025    LIPID PANEL  04/16/2025    URINE MICROALBUMIN  04/16/2025    DIABETIC EYE EXAM  08/15/2025    TDAP/TD VACCINES (3 - Td or Tdap) 01/01/2026    HEPATITIS C SCREENING  Completed    Pneumococcal Vaccine 65+  Completed    COLORECTAL CANCER SCREENING  Discontinued                                                                                                                                                CMS Preventative Services Quick Reference  Risk Factors Identified During Encounter  None Identified    The above risks/problems have been discussed with the patient.  Pertinent information has been shared with the patient in the After Visit Summary.  An After Visit Summary and PPPS were made available to the patient.    Follow Up:   Next Medicare Wellness visit to be scheduled in 1 year.         Additional E&M Note during same encounter follows:  Patient has additional, significant, and separately identifiable condition(s)/problem(s) that require work above and beyond the Medicare Wellness Visit     Chief Complaint  Medicare Wellness-subsequent    Subjective   Belem Acosta is also being seen today for 6-month follow-up for chronic condition management.      Hypertension-patient is currently on Zebeta 2.5 mg.  Patient's blood pressure elevated but at home readings stable. Patient reports her blood pressure at home usually runs 130s over 60s.  Patient denies any adverse effects of medication.     Diabetes type 2-patient is currently taking Farxiga 10 mg and Toujeo.  Patient is followed by diabetes management, BRISSA Carey.  Patient reports doing well medication.  Denies any adverse effects. Last A1c was at 7.6.      Hypothyroidism-patient is currently on levothyroxine 112 mcg.   "Reports doing well medication.  Denies any adverse effects.  She is due updated TSH and T4.     Anxiety-patient is currently taking Paxil 20 mg.  Patient reports controls her anxiety well.  Denies any adverse effects.     Hyperlipidemia-patient is currently on Zocor 40 mg.  Patient reports doing well medication.  Denies any adverse effects.  Updated lipid panel today.     Patient is established with sleep medicine doctor Dr. Leggett.  Patient reports she uses CPAP nightly.     Patient is due labs. Orders placed at today's visit. Discussed with patient that these are fasting labs.      Discussed with patient vaccines that she is eligible for to include pneumonia, RSV. She ia agreeable to pneumonia in office today, she can get RSV at pharmacy.      No other questions or concerns today.          Review of Systems   All other systems reviewed and are negative.             Objective   Vital Signs:  /65 Comment: home reading  Pulse 78   Ht 160 cm (63\")   Wt 102 kg (225 lb)   SpO2 94%   BMI 39.86 kg/m²   Physical Exam  Vitals reviewed.   Constitutional:       General: She is not in acute distress.     Appearance: She is not ill-appearing.   HENT:      Head: Normocephalic and atraumatic.   Eyes:      Conjunctiva/sclera: Conjunctivae normal.   Cardiovascular:      Rate and Rhythm: Normal rate and regular rhythm.      Heart sounds: Normal heart sounds.   Pulmonary:      Effort: Pulmonary effort is normal.      Breath sounds: Normal breath sounds.   Skin:     General: Skin is warm and dry.   Neurological:      Mental Status: She is alert and oriented to person, place, and time.   Psychiatric:         Mood and Affect: Mood normal.         Behavior: Behavior normal.         Thought Content: Thought content normal.         Judgment: Judgment normal.         The following data was reviewed by: BRISSA Ruvalcaba on 10/15/2024:        Assessment and Plan Additional age appropriate preventative wellness advice topics " were discussed during today's preventative wellness exam(some topics already addressed during AWV portion of the note above):    Physical Activity: Advised cardiovascular activity 150 minutes per week as tolerated. (example brisk walk for 30 minutes, 5 days a week).     Nutrition: Discussed nutrition plan with patient. Information shared in after visit summary. Goal is for a well balanced diet to enhance overall health.     Healthy Weight: Discussed current and goal BMI with patient. Steps to attain this goal discussed. Information shared in after visit summary.     Injury Prevention Discussion:  Information shared in after visit summary.                    Medicare annual wellness visit, subsequent    Primary hypertension    Hypothyroidism, unspecified type    Hyperlipidemia, unspecified hyperlipidemia type       Orders Placed This Encounter   Procedures    Comprehensive Metabolic Panel     Standing Status:   Future     Standing Expiration Date:   10/15/2025     Order Specific Question:   Release to patient     Answer:   Routine Release [[id number]]    Lipid Panel     Standing Status:   Future     Standing Expiration Date:   10/15/2025     Order Specific Question:   Release to patient     Answer:   Routine Release [[id number]]    TSH+Free T4     Standing Status:   Future     Standing Expiration Date:   10/15/2025     Order Specific Question:   Release to patient     Answer:   Routine Release [[id number]]    CBC & Differential     Standing Status:   Future     Standing Expiration Date:   10/15/2025     Order Specific Question:   Manual Differential     Answer:   No     Order Specific Question:   Release to patient     Answer:   Routine Release [[id number]]     New Medications Ordered This Visit   Medications    bisoprolol (ZEBeta) 5 MG tablet     Sig: Take 0.5 tablets by mouth Daily.     Dispense:  90 tablet     Refill:  1    levothyroxine (SYNTHROID, LEVOTHROID) 112 MCG tablet     Sig: Take 1 tablet by mouth Every  Morning.     Dispense:  90 tablet     Refill:  1    simvastatin (ZOCOR) 40 MG tablet     Sig: Take 1 tablet by mouth Every Night.     Dispense:  90 tablet     Refill:  1          Follow Up   No follow-ups on file.  Patient was given instructions and counseling regarding her condition or for health maintenance advice. Please see specific information pulled into the AVS if appropriate.

## 2024-11-04 ENCOUNTER — LAB (OUTPATIENT)
Dept: LAB | Facility: HOSPITAL | Age: 79
End: 2024-11-04
Payer: MEDICARE

## 2024-11-04 DIAGNOSIS — Z00.00 MEDICARE ANNUAL WELLNESS VISIT, SUBSEQUENT: ICD-10-CM

## 2024-11-04 LAB
ALBUMIN SERPL-MCNC: 4.2 G/DL (ref 3.5–5.2)
ALBUMIN/GLOB SERPL: 1.4 G/DL
ALP SERPL-CCNC: 106 U/L (ref 39–117)
ALT SERPL W P-5'-P-CCNC: 16 U/L (ref 1–33)
ANION GAP SERPL CALCULATED.3IONS-SCNC: 11.4 MMOL/L (ref 5–15)
AST SERPL-CCNC: 16 U/L (ref 1–32)
BASOPHILS # BLD AUTO: 0.04 10*3/MM3 (ref 0–0.2)
BASOPHILS NFR BLD AUTO: 0.5 % (ref 0–1.5)
BILIRUB SERPL-MCNC: 0.4 MG/DL (ref 0–1.2)
BUN SERPL-MCNC: 15 MG/DL (ref 8–23)
BUN/CREAT SERPL: 23.1 (ref 7–25)
CALCIUM SPEC-SCNC: 9.6 MG/DL (ref 8.6–10.5)
CHLORIDE SERPL-SCNC: 103 MMOL/L (ref 98–107)
CHOLEST SERPL-MCNC: 164 MG/DL (ref 0–200)
CO2 SERPL-SCNC: 26.6 MMOL/L (ref 22–29)
CREAT SERPL-MCNC: 0.65 MG/DL (ref 0.57–1)
DEPRECATED RDW RBC AUTO: 44 FL (ref 37–54)
EGFRCR SERPLBLD CKD-EPI 2021: 90.8 ML/MIN/1.73
EOSINOPHIL # BLD AUTO: 0.07 10*3/MM3 (ref 0–0.4)
EOSINOPHIL NFR BLD AUTO: 0.9 % (ref 0.3–6.2)
ERYTHROCYTE [DISTWIDTH] IN BLOOD BY AUTOMATED COUNT: 13.5 % (ref 12.3–15.4)
GLOBULIN UR ELPH-MCNC: 3.1 GM/DL
GLUCOSE SERPL-MCNC: 130 MG/DL (ref 65–99)
HCT VFR BLD AUTO: 45.8 % (ref 34–46.6)
HDLC SERPL-MCNC: 55 MG/DL (ref 40–60)
HGB BLD-MCNC: 15.2 G/DL (ref 12–15.9)
IMM GRANULOCYTES # BLD AUTO: 0.03 10*3/MM3 (ref 0–0.05)
IMM GRANULOCYTES NFR BLD AUTO: 0.4 % (ref 0–0.5)
LDLC SERPL CALC-MCNC: 86 MG/DL (ref 0–100)
LDLC/HDLC SERPL: 1.49 {RATIO}
LYMPHOCYTES # BLD AUTO: 2.91 10*3/MM3 (ref 0.7–3.1)
LYMPHOCYTES NFR BLD AUTO: 35.9 % (ref 19.6–45.3)
MCH RBC QN AUTO: 29.6 PG (ref 26.6–33)
MCHC RBC AUTO-ENTMCNC: 33.2 G/DL (ref 31.5–35.7)
MCV RBC AUTO: 89.3 FL (ref 79–97)
MONOCYTES # BLD AUTO: 0.62 10*3/MM3 (ref 0.1–0.9)
MONOCYTES NFR BLD AUTO: 7.6 % (ref 5–12)
NEUTROPHILS NFR BLD AUTO: 4.44 10*3/MM3 (ref 1.7–7)
NEUTROPHILS NFR BLD AUTO: 54.7 % (ref 42.7–76)
NRBC BLD AUTO-RTO: 0 /100 WBC (ref 0–0.2)
PLATELET # BLD AUTO: 336 10*3/MM3 (ref 140–450)
PMV BLD AUTO: 9 FL (ref 6–12)
POTASSIUM SERPL-SCNC: 4.7 MMOL/L (ref 3.5–5.2)
PROT SERPL-MCNC: 7.3 G/DL (ref 6–8.5)
RBC # BLD AUTO: 5.13 10*6/MM3 (ref 3.77–5.28)
SODIUM SERPL-SCNC: 141 MMOL/L (ref 136–145)
T4 FREE SERPL-MCNC: 1.38 NG/DL (ref 0.92–1.68)
TRIGL SERPL-MCNC: 134 MG/DL (ref 0–150)
TSH SERPL DL<=0.05 MIU/L-ACNC: 0.92 UIU/ML (ref 0.27–4.2)
VLDLC SERPL-MCNC: 23 MG/DL (ref 5–40)
WBC NRBC COR # BLD AUTO: 8.11 10*3/MM3 (ref 3.4–10.8)

## 2024-11-04 PROCEDURE — 85025 COMPLETE CBC W/AUTO DIFF WBC: CPT

## 2024-11-04 PROCEDURE — 80061 LIPID PANEL: CPT

## 2024-11-04 PROCEDURE — 80053 COMPREHEN METABOLIC PANEL: CPT

## 2024-11-04 PROCEDURE — 84443 ASSAY THYROID STIM HORMONE: CPT

## 2024-11-04 PROCEDURE — 84439 ASSAY OF FREE THYROXINE: CPT

## 2024-11-04 PROCEDURE — 36415 COLL VENOUS BLD VENIPUNCTURE: CPT

## 2025-02-05 ENCOUNTER — OFFICE VISIT (OUTPATIENT)
Dept: SLEEP MEDICINE | Facility: HOSPITAL | Age: 80
End: 2025-02-05
Payer: MEDICARE

## 2025-02-05 VITALS
OXYGEN SATURATION: 94 % | BODY MASS INDEX: 39.97 KG/M2 | DIASTOLIC BLOOD PRESSURE: 57 MMHG | HEART RATE: 76 BPM | WEIGHT: 225.6 LBS | SYSTOLIC BLOOD PRESSURE: 144 MMHG | HEIGHT: 63 IN

## 2025-02-05 DIAGNOSIS — G47.33 OSA ON CPAP: Primary | ICD-10-CM

## 2025-02-05 DIAGNOSIS — E66.812 CLASS 2 OBESITY: ICD-10-CM

## 2025-02-05 DIAGNOSIS — I10 PRIMARY HYPERTENSION: ICD-10-CM

## 2025-02-05 PROCEDURE — 99214 OFFICE O/P EST MOD 30 MIN: CPT | Performed by: INTERNAL MEDICINE

## 2025-02-05 PROCEDURE — 1159F MED LIST DOCD IN RCRD: CPT | Performed by: INTERNAL MEDICINE

## 2025-02-05 PROCEDURE — G0463 HOSPITAL OUTPT CLINIC VISIT: HCPCS

## 2025-02-05 PROCEDURE — 3078F DIAST BP <80 MM HG: CPT | Performed by: INTERNAL MEDICINE

## 2025-02-05 PROCEDURE — 3077F SYST BP >= 140 MM HG: CPT | Performed by: INTERNAL MEDICINE

## 2025-02-05 PROCEDURE — 1160F RVW MEDS BY RX/DR IN RCRD: CPT | Performed by: INTERNAL MEDICINE

## 2025-02-05 NOTE — PROGRESS NOTES
"  [facility]  Sleep medicine  [address] [place] [address]  [place]   [place] [address]  Phone: [phone]  Fax: [phone]      SLEEP CLINIC FOLLOW UP PROGRESS NOTE.    Belem Acosta  [id number]   1945  79 y.o.  female      PCP: Oneyda Taylor APRN      Date of visit: 2/5/2025    Chief Complaint   Patient presents with    Sleep Apnea    Obesity       HPI:  This is a 79 y.o. years old patient is here for the management of obstructive sleep apnea.  Sleep apnea is very severe in severity with a AHI of 110/hr. Patient is using positive airway pressure therapy with auto CPAP and the symptoms of sleep apnea have improved significantly on the therapy. Normally patient goes to bed at 10 PM and wakes up at 730 AM .  The patient wakes up 1 time(s) during the night and has no problem going back to sleep.  Feels refreshed after waking up.  She cannot sleep without the CPAP    Medications and allergies are reviewed by me and documented in the encounter.     SOCIAL (habits pertaining to sleep medicine)  History tobacco use:No   History of alcohol use: 0 per week  Caffeine use: 2     REVIEW OF SYSTEMS:   Pertaining positive symptoms are:  Bill[place] Sleepiness Scale :Total score: 7   Nasal congestion      PHYSICAL EXAMINATION:  CONSTITUTIONAL:  Vitals:    02/05/25 1300   BP: 144/57   Pulse: 76   SpO2: 94%   Weight: 102 kg (225 lb 9.6 oz)   Height: 160 cm (62.99\")    Body mass index is 39.97 kg/m².   NOSE: nasal passages are clear, No deformities noted   RESP SYSTEM: Not in any respiratory distress, no chest deformities noted,   CARDIOVASULAR: No edema noted  NEURO: Oriented x 3, gait normal,  Mood and affect appeared appropriate      Data reviewed:  The Smart card downloaded on 2/5/2025 has been reviewed independently by me for compliance and discussed the data with the patient.   Compliance; 100%  More than 4 hr use, 99%  Average use of the device 8 hours and 22 minutes per night  Residual AHI: 3.3 /hr "   Mask type: Fullface mask  Device: DreamStation 2  DME: [employer]        ASSESSMENT AND PLAN:  Obstructive sleep apnea ( G 47.33).  The symptoms of sleep apnea have improved with the device and the treatment.  Patient's compliance with the device is excellent for treatment of sleep apnea.  I have independently reviewed the smart card down load and discussed with the patient the download data and encouarged the patient to continue to use the device.The residual AHI is acceptable. The device is benefiting the patient and the device is medically necessary.  Without proper control of sleep apnea and good compliance there is a increased risk for hypertension, diabetes mellitus and nonrestorative sleep with hypersomnia which can increase risk for motor vehicle accidents.  Untreated sleep apnea is also a risk factor for development of atrial fibrillation, pulmonary hypertension, insulin resistance and stroke. The patient is also instructed to get the supplies from the DME company and and change them on a regular basis.  A prescription for supplies has been sent to the DME company.  I have also discussed the good sleep hygiene habits and adequate amount of sleep needed for good health.  Obesity  2 with BMI is Body mass index is 39.97 kg/m².. I have discuss the relationship between the weight and sleep apnea. The benefit of weight loss in reducing severity of sleep apnea was discussed. Discussed diet and exercise with the patient to achieve ideal BMI.   Return in about 1 year (around 2/5/2026) for with smart card down load. . Patient's questions were answered.    2/5/2025  Janak Leggett MD  Sleep Medicine.  Medical Director,   [facility], [facility] sleep centers.

## 2025-02-12 NOTE — PROGRESS NOTES
Chief Complaint  Diabetes (Follow up, med mgt, A1c eval)    Referred By: BRISSA Ruvalcaba    Patient or patient representative verbalized consent for the use of Ambient Listening during the visit with  BRISSA Melvin for chart documentation. 2/13/2025  08:38 EST    Subjective          Belem Acosta presents to [facility] for diabetes medication management    History of Present Illness    History of Present Illness  The patient is a 79-year-old female who presents for a follow-up on her diabetes.    She was previously prescribed Januvia 25 mg but discontinued its use due to adverse effects. She has been managing her diabetes with Toujeo, alternating between 46 and 48 units, and Farxiga 10 mg, which she tolerates well. She reports no new health issues, including frequent thirst or urination. Her appetite remains robust, and she has been engaging in social activities such as family visits. She reports no hypoglycemic episodes and monitors her blood glucose levels twice daily, with readings typically ranging from 113 to 130s in the morning. The highest recorded level was 158, but it is generally in the 120s or 130s. She reports no foot-related issues and receives monthly pedicures at [facility]. She is requesting a new meter.    Supplemental Information  She had an eye exam with Dr. Youngblood on 08/01/2024, who said her vision is great and everything is very stable. She reports no fatigue or other issues aside from old age and arthritis.    ALLERGIES  The patient is allergic to METFORMIN, JANUVIA, and LISINOPRIL.    MEDICATIONS  Current: Toujeo, Farxiga  Discontinued: Januvia         Visit type:  follow-up  Diabetes type:  Type 2  Current diabetes status/concerns/issues: Last visit her Toujeo was increased to 47 units once daily and Januvia 25 mg once daily was prescribed.  She reports she could not tolerate the Januvia due to GI distress.  Other health concerns: No new  health concerns  Current Diabetes symptoms:    Polyuria: No   Polydipsia: No   Polyphagia: No   Blurred vision: No   Excessive fatigue: No  Known Diabetes complications:  Neuro: None  Renal: None  Eyes: She has an eye exam twice yr with  8/1/24  Amputation/Wounds: None  GI: None  Cardiovascular: HTN, Hyperlipidemia  ED: N/A  Other: None  Hypoglycemia:  None reported at this time  Hypoglycemia Symptoms:  No hypoglycemia at this time  Current diabetes treatment:  Toujeo 46 units qd,  farxiga 10mg qd  Blood glucose device:  Meter  Blood glucose monitoring frequency:  2  Blood glucose range/average:   110-130mg/dl fasting and up to 158mg/dl nonfasting  Glucose Source: Patient Reported  Diet:  Limits high carb/sweet foods, Avoids sugary drinks, Number of meals each day - 3; Number of snacks each day - 1.   Activity/Exercise:  states she just started walking again. She is walking up and down stairs since the weather has been bad outdoor    Past Medical History:   Diagnosis Date    Abnormal bone density screening 03/2019    Normal    Ankle pain     Anxiety     Arthritis     Bunion     Cervical disc disorder     Corns and callus     CTS (carpal tunnel syndrome)     Diabetes mellitus, type 2     Hammertoe     Hyperglycemia     Hyperlipidemia     Hypertension     Hypothyroidism     Impaired fasting glucose 08/19/2014    Low back pain     Lumbosacral disc disease     Panic disorder     Pap smear for cervical cancer screening 2019    NL PER PT SEES DR. GEORGETTE [place]    PONV (postoperative nausea and vomiting)     Screening mammogram, encounter for 05/2020    NORMAL DR. PALOMINO [place]    Sensorineural hearing loss (SNHL) of both ears     Sleep apnea 08/26/2015    Sleep apnea     Thoracic disc disorder      Past Surgical History:   Procedure Laterality Date    CARPAL TUNNEL INJECTION  2002 2004    CARPAL TUNNEL RELEASE      CATARACT EXTRACTION WITH INTRAOCULAR LENS IMPLANT  2012    RIGHT EYE     CHOLECYSTECTOMY  1982    COLONOSCOPY  02/2017    POLYPS, TUBULAR ADENOMA    EPIDURAL BLOCK      LUMBAR LAMINECTOMY Left 9/20/2023    Procedure: MINIMALLY INVASIVE LUMBAR LAMINECTOMY, left approach, lumbar 3-lumbar 4;  Surgeon: Sb Jones MD;  Location: [facility] MAIN OR;  Service: Neurosurgery;  Laterality: Left;    ROTATOR CUFF REPAIR  06/2013    Left    THYROIDECTOMY, PARTIAL  1997    Rt thyroid d/t growth-bengin    WRIST SURGERY  2008     Family History   Problem Relation Age of Onset    Heart disease Mother     Heart failure Mother     Hypertension Mother     Diabetes Mother     Arthritis Mother     Hyperlipidemia Mother     Emphysema Father     Heart disease Father     Heart failure Father     COPD Father     Hyperlipidemia Sister     Pancreatic cancer Brother     Hyperlipidemia Brother     Diabetes Brother     Heart disease Maternal Grandmother     Heart disease Maternal Grandfather     Stroke Other     Heart disease Other     Malig Hyperthermia Neg Hx      Social History     Socioeconomic History    Marital status:    Tobacco Use    Smoking status: Never     Passive exposure: Never    Smokeless tobacco: Never   Vaping Use    Vaping status: Never Used   Substance and Sexual Activity    Alcohol use: Yes     Alcohol/week: 3.0 standard drinks of alcohol     Types: 3 Glasses of wine per week     Comment: Rarely    Drug use: Never    Sexual activity: Not Currently     Partners: Male     Allergies   Allergen Reactions    Metformin Nausea And Vomiting and Unknown - Low Severity    Januvia [Sitagliptin] GI Intolerance     Nausea and vomiting    Lisinopril Cough       Current Outpatient Medications:     BD Pen Needle Juana 2nd Gen 32G X 4 MM misc, USE 1 PEN NEEDLE DAILY ONCE IN THE MORNING, Disp: 90 each, Rfl: 3    bisoprolol (ZEBeta) 5 MG tablet, Take 0.5 tablets by mouth Daily., Disp: 90 tablet, Rfl: 1    Farxiga 10 MG tablet, Take 10 mg by mouth Daily for 180 days., Disp: 90 tablet, Rfl: 1    ipratropium  "(ATROVENT) 0.06 % nasal spray, , Disp: , Rfl:     latanoprost (XALATAN) 0.005 % ophthalmic solution, Administer 1 drop to both eyes Every Night., Disp: , Rfl:     levothyroxine (SYNTHROID, LEVOTHROID) 112 MCG tablet, Take 1 tablet by mouth Every Morning., Disp: 90 tablet, Rfl: 1    PARoxetine (PAXIL) 20 MG tablet, TAKE 1 TABLET EVERY MORNING, Disp: 90 tablet, Rfl: 3    simvastatin (ZOCOR) 40 MG tablet, Take 1 tablet by mouth Every Night., Disp: 90 tablet, Rfl: 1    Toujeo Max SoloStar 300 UNIT/ML solution pen-injector injection, Inject 50 Units under the skin into the appropriate area as directed Daily for 90 days., Disp: 15 mL, Rfl: 1    Blood Glucose Monitoring Suppl device, Use as directed for blood glucose monitoring, Disp: 1 each, Rfl: 0    glucose blood test strip, Test blood glucose 2 times each day, Disp: 200 each, Rfl: 5    Lancets misc, Test blood glucose 2 times each day, Disp: 200 each, Rfl: 5    Objective     Vitals:    02/13/25 0817   BP: 139/67   Pulse: 78   SpO2: 98%   Weight: 102 kg (225 lb 14.4 oz)   Height: 160 cm (62.99\")   PainSc: 0-No pain     Body mass index is 40.03 kg/m².    Physical Exam  Constitutional:       Appearance: Normal appearance. She is obese.      Comments: Severe Obesity (BMI >= 40) Pt Current BMI = 40.03     HENT:      Head: Normocephalic and atraumatic.      Right Ear: External ear normal.      Left Ear: External ear normal.      Nose: Nose normal.   Eyes:      Extraocular Movements: Extraocular movements intact.      Conjunctiva/sclera: Conjunctivae normal.   Pulmonary:      Effort: Pulmonary effort is normal.   Musculoskeletal:         General: Normal range of motion.      Cervical back: Normal range of motion.   Skin:     General: Skin is warm and dry.   Neurological:      General: No focal deficit present.      Mental Status: She is alert and oriented to person, place, and time. Mental status is at baseline.   Psychiatric:         Mood and Affect: Mood normal.         " Behavior: Behavior normal.         Thought Content: Thought content normal.         Judgment: Judgment normal.         Diabetic Foot Exam Performed and Monofilament Test Performed  Diabetic foot exam:   Left: Filament test present   Pulses Dorsalis Pedis:  present  Posterior Tibial:  present  Vibratory sensation normal   Proprioception normal   Sharp/dull discrimination normal       Right: Filament test present   Pulses Dorsalis Pedis:  present  Posterior Tibial:  present   Vibratory sensation normal   Proprioception normal   Sharp/dull discrimination normal     Result Review :   The following data was reviewed by: BRISSA Melvin on 02/13/2025:    Most Recent A1C          2/13/2025    08:29   HGBA1C Most Recent   Hemoglobin A1C 7.5        A1C Last 3 Results          4/16/2024    09:21 9/18/2024    08:24 2/13/2025    08:29   HGBA1C Last 3 Results   Hemoglobin A1C 7.50  7.6  7.5      A1c collected in the office today is 7.5%, indicating Uncontrolled Type II diabetes.  This result is down from the prior result of 7.6% collected on 9/18/24     Glucose   Date Value Ref Range Status   02/13/2025 145 (H) 70 - 99 mg/dL Final     Comment:     Serial Number: [id number]:  [id number]     Point of care glucose in the office today is within normal limits for nonfasting glucose    Creatinine   Date Value Ref Range Status   11/04/2024 0.65 0.57 - 1.00 mg/dL Final   04/16/2024 0.72 0.57 - 1.00 mg/dL Final     eGFR   Date Value Ref Range Status   11/04/2024 90.8 >60.0 mL/min/1.73 Final   04/16/2024 86.8 >60.0 mL/min/1.73 Final     Labs collected on 11/4/24 show Normal values    Microalbumin, Urine   Date Value Ref Range Status   04/16/2024 <1.2 mg/dL Final   01/04/2023 1.3 mg/dL Final     Creatinine, Urine   Date Value Ref Range Status   04/16/2024 79.6 mg/dL Final     Microalbumin/Creatinine Ratio   Date Value Ref Range Status   04/16/2024   Final     Comment:     Unable to calculate   04/01/2021 12.8 0.0 - 35.0  mg/g[Cre] Final     Urine microalbuminuria collected on 4/16/24 is negative for microalbuminuria    Total Cholesterol   Date Value Ref Range Status   11/04/2024 164 0 - 200 mg/dL Final   04/16/2024 167 0 - 200 mg/dL Final     Triglycerides   Date Value Ref Range Status   11/04/2024 134 0 - 150 mg/dL Final   04/16/2024 133 0 - 150 mg/dL Final     HDL Cholesterol   Date Value Ref Range Status   11/04/2024 55 40 - 60 mg/dL Final   04/16/2024 64 (H) 40 - 60 mg/dL Final     LDL Cholesterol    Date Value Ref Range Status   11/04/2024 86 0 - 100 mg/dL Final   04/16/2024 80 0 - 100 mg/dL Final     Lipid panel collected on 11/4/24 shows normal lipid panel              Diagnoses and all orders for this visit:    1. Type 2 diabetes mellitus with hyperglycemia, with long-term current use of insulin (Primary)  -     POC Glycosylated Hemoglobin (Hb A1C)  -     Toujeo Max SoloStar 300 UNIT/ML solution pen-injector injection; Inject 50 Units under the skin into the appropriate area as directed Daily for 90 days.  Dispense: 15 mL; Refill: 1  -     Farxiga 10 MG tablet; Take 10 mg by mouth Daily for 180 days.  Dispense: 90 tablet; Refill: 1  -     Blood Glucose Monitoring Suppl device; Use as directed for blood glucose monitoring  Dispense: 1 each; Refill: 0  -     Lancets misc; Test blood glucose 2 times each day  Dispense: 200 each; Refill: 5  -     glucose blood test strip; Test blood glucose 2 times each day  Dispense: 200 each; Refill: 5    2. Type 2 diabetes mellitus without complication, with long-term current use of insulin    3. Severe obesity (BMI >= 40)    4. Uncontrolled type 2 diabetes mellitus with hyperglycemia    Other orders  -     POC Glucose          Assessment & Plan  1. Diabetes Mellitus.  Her A1c level has shown a slight increase from 7.5 to 7.6. She is currently on Toujeo 47 units and Farxiga 10 mg, which she tolerates well. She reports no issues with low blood sugars and checks her blood sugar levels a couple  of times a day, with morning readings typically in the 120s to 130s. Kidney function is normal. No proteinuria. The dosage of Toujeo will be increased to 50 units daily to help achieve an A1c level below 7. Prescriptions for Toujeo and Farxiga have been renewed. A new meter, strips, and lancets have been ordered through [facility]..    Follow-up  The patient will follow up in 3 months.      The patient will monitor her blood glucose levels 2 times daily.  If she develops problematic hyperglycemia or hypoglycemia or adverse drug reactions, she will contact the office for further instructions.        Follow Up     Return in about 3 months (around 5/13/2025) for Medication Mgmt.    Patient was given instructions and counseling regarding her condition or for health maintenance advice. Please see specific information pulled into the AVS if appropriate.     Elena Nuñez, APRN  02/13/2025      Dictated Utilizing Dragon Dictation.  Please note that portions of this note were completed with a voice recognition program.  Part of this note may be an electronic transcription/translation of spoken language to printed text using the Dragon Dictation System.

## 2025-02-13 ENCOUNTER — OFFICE VISIT (OUTPATIENT)
Dept: DIABETES SERVICES | Facility: HOSPITAL | Age: 80
End: 2025-02-13
Payer: MEDICARE

## 2025-02-13 VITALS
WEIGHT: 225.9 LBS | SYSTOLIC BLOOD PRESSURE: 139 MMHG | HEART RATE: 78 BPM | DIASTOLIC BLOOD PRESSURE: 67 MMHG | HEIGHT: 63 IN | BODY MASS INDEX: 40.03 KG/M2 | OXYGEN SATURATION: 98 %

## 2025-02-13 DIAGNOSIS — Z79.4 TYPE 2 DIABETES MELLITUS WITHOUT COMPLICATION, WITH LONG-TERM CURRENT USE OF INSULIN: ICD-10-CM

## 2025-02-13 DIAGNOSIS — E66.01 SEVERE OBESITY (BMI >= 40): ICD-10-CM

## 2025-02-13 DIAGNOSIS — E11.65 UNCONTROLLED TYPE 2 DIABETES MELLITUS WITH HYPERGLYCEMIA: ICD-10-CM

## 2025-02-13 DIAGNOSIS — E11.9 TYPE 2 DIABETES MELLITUS WITHOUT COMPLICATION, WITH LONG-TERM CURRENT USE OF INSULIN: ICD-10-CM

## 2025-02-13 DIAGNOSIS — Z79.4 TYPE 2 DIABETES MELLITUS WITH HYPERGLYCEMIA, WITH LONG-TERM CURRENT USE OF INSULIN: Primary | ICD-10-CM

## 2025-02-13 DIAGNOSIS — E11.65 TYPE 2 DIABETES MELLITUS WITH HYPERGLYCEMIA, WITH LONG-TERM CURRENT USE OF INSULIN: Primary | ICD-10-CM

## 2025-02-13 LAB
EXPIRATION DATE: ABNORMAL
GLUCOSE BLDC GLUCOMTR-MCNC: 145 MG/DL (ref 70–99)
HBA1C MFR BLD: 7.5 % (ref 4.5–5.7)
Lab: ABNORMAL

## 2025-02-13 PROCEDURE — 3075F SYST BP GE 130 - 139MM HG: CPT | Performed by: NURSE PRACTITIONER

## 2025-02-13 PROCEDURE — 82948 REAGENT STRIP/BLOOD GLUCOSE: CPT | Performed by: NURSE PRACTITIONER

## 2025-02-13 PROCEDURE — G0463 HOSPITAL OUTPT CLINIC VISIT: HCPCS | Performed by: NURSE PRACTITIONER

## 2025-02-13 PROCEDURE — 1160F RVW MEDS BY RX/DR IN RCRD: CPT | Performed by: NURSE PRACTITIONER

## 2025-02-13 PROCEDURE — 1159F MED LIST DOCD IN RCRD: CPT | Performed by: NURSE PRACTITIONER

## 2025-02-13 PROCEDURE — 3078F DIAST BP <80 MM HG: CPT | Performed by: NURSE PRACTITIONER

## 2025-02-13 PROCEDURE — 83036 HEMOGLOBIN GLYCOSYLATED A1C: CPT | Performed by: NURSE PRACTITIONER

## 2025-02-13 PROCEDURE — 99214 OFFICE O/P EST MOD 30 MIN: CPT | Performed by: NURSE PRACTITIONER

## 2025-02-13 RX ORDER — LANCETS 30 GAUGE
EACH MISCELLANEOUS
Qty: 200 EACH | Refills: 5 | Status: SHIPPED | OUTPATIENT
Start: 2025-02-13

## 2025-02-13 RX ORDER — LANCETS 30 GAUGE
EACH MISCELLANEOUS
Qty: 200 EACH | Refills: 5 | Status: SHIPPED | OUTPATIENT
Start: 2025-02-13 | End: 2025-02-13

## 2025-02-13 RX ORDER — INSULIN GLARGINE 300 U/ML
50 INJECTION, SOLUTION SUBCUTANEOUS DAILY
Qty: 15 ML | Refills: 1 | Status: SHIPPED | OUTPATIENT
Start: 2025-02-13 | End: 2025-05-14

## 2025-02-13 RX ORDER — DAPAGLIFLOZIN 10 MG/1
1 TABLET, FILM COATED ORAL DAILY
Qty: 90 TABLET | Refills: 1 | Status: SHIPPED | OUTPATIENT
Start: 2025-02-13 | End: 2025-08-12

## 2025-02-21 DIAGNOSIS — E11.65 TYPE 2 DIABETES MELLITUS WITH HYPERGLYCEMIA, WITH LONG-TERM CURRENT USE OF INSULIN: Primary | ICD-10-CM

## 2025-02-21 DIAGNOSIS — Z79.4 TYPE 2 DIABETES MELLITUS WITH HYPERGLYCEMIA, WITH LONG-TERM CURRENT USE OF INSULIN: Primary | ICD-10-CM

## 2025-02-21 RX ORDER — BLOOD-GLUCOSE METER
1 EACH MISCELLANEOUS 2 TIMES DAILY
Qty: 1 KIT | Refills: 0 | Status: SHIPPED | OUTPATIENT
Start: 2025-02-21 | End: 2025-02-24 | Stop reason: ALTCHOICE

## 2025-02-24 DIAGNOSIS — E11.65 TYPE 2 DIABETES MELLITUS WITH HYPERGLYCEMIA, WITH LONG-TERM CURRENT USE OF INSULIN: Primary | ICD-10-CM

## 2025-02-24 DIAGNOSIS — Z79.4 TYPE 2 DIABETES MELLITUS WITH HYPERGLYCEMIA, WITH LONG-TERM CURRENT USE OF INSULIN: Primary | ICD-10-CM

## 2025-02-24 RX ORDER — BLOOD-GLUCOSE METER
1 EACH MISCELLANEOUS 2 TIMES DAILY
Qty: 1 KIT | Refills: 0 | Status: SHIPPED | OUTPATIENT
Start: 2025-02-24

## 2025-04-10 ENCOUNTER — TELEPHONE (OUTPATIENT)
Dept: FAMILY MEDICINE CLINIC | Facility: CLINIC | Age: 80
End: 2025-04-10
Payer: MEDICARE

## 2025-04-10 NOTE — TELEPHONE ENCOUNTER
HUB TO SCHEDULE & RELAY:     Called patient to reschedule appt on 4/15/25 due to provider being out of the office. LVM.

## 2025-04-11 NOTE — TELEPHONE ENCOUNTER
COOLName: Belem Acosta    Relationship: Self    Best Callback Number:     [phone]       HUB PROVIDED THE RELAY MESSAGE FROM THE OFFICE   PATIENT SCHEDULED AS REQUESTED    ADDITIONAL INFORMATION:

## 2025-04-11 NOTE — TELEPHONE ENCOUNTER
HUB TO SCHEDULE & RELAY:     Called patient to reschedule appt on 4/15/25 due to provider being out of the office. LVM x2

## 2025-04-18 ENCOUNTER — OFFICE VISIT (OUTPATIENT)
Dept: FAMILY MEDICINE CLINIC | Facility: CLINIC | Age: 80
End: 2025-04-18
Payer: MEDICARE

## 2025-04-18 VITALS
BODY MASS INDEX: 40.15 KG/M2 | HEIGHT: 63 IN | SYSTOLIC BLOOD PRESSURE: 135 MMHG | HEART RATE: 82 BPM | WEIGHT: 226.6 LBS | DIASTOLIC BLOOD PRESSURE: 75 MMHG | OXYGEN SATURATION: 93 %

## 2025-04-18 DIAGNOSIS — E03.9 HYPOTHYROIDISM, UNSPECIFIED TYPE: ICD-10-CM

## 2025-04-18 DIAGNOSIS — F41.9 ANXIETY: ICD-10-CM

## 2025-04-18 DIAGNOSIS — Z79.4 TYPE 2 DIABETES MELLITUS WITH HYPERGLYCEMIA, WITH LONG-TERM CURRENT USE OF INSULIN: ICD-10-CM

## 2025-04-18 DIAGNOSIS — E11.65 TYPE 2 DIABETES MELLITUS WITH HYPERGLYCEMIA, WITH LONG-TERM CURRENT USE OF INSULIN: ICD-10-CM

## 2025-04-18 DIAGNOSIS — M25.551 CHRONIC RIGHT HIP PAIN: ICD-10-CM

## 2025-04-18 DIAGNOSIS — E78.5 HYPERLIPIDEMIA, UNSPECIFIED HYPERLIPIDEMIA TYPE: ICD-10-CM

## 2025-04-18 DIAGNOSIS — I10 PRIMARY HYPERTENSION: Primary | ICD-10-CM

## 2025-04-18 DIAGNOSIS — M25.561 CHRONIC PAIN OF RIGHT KNEE: ICD-10-CM

## 2025-04-18 DIAGNOSIS — G89.29 CHRONIC RIGHT HIP PAIN: ICD-10-CM

## 2025-04-18 DIAGNOSIS — Z91.09 ENVIRONMENTAL ALLERGIES: ICD-10-CM

## 2025-04-18 DIAGNOSIS — G89.29 CHRONIC PAIN OF RIGHT KNEE: ICD-10-CM

## 2025-04-18 RX ORDER — IPRATROPIUM BROMIDE 42 UG/1
2 SPRAY, METERED NASAL DAILY
Qty: 15 ML | Refills: 2 | Status: SHIPPED | OUTPATIENT
Start: 2025-04-18

## 2025-04-18 NOTE — PROGRESS NOTES
Chief Complaint  Hypertension, Hypothyroidism, Diabetes, and Hyperlipidemia    SUBJECTIVE  Belem Acosta presents to [facility]    History of Present Illness  Belem Acosta is  being seen today for 6-month follow-up for chronic condition management.      Hypertension-patient is currently on Zebeta 2.5 mg.  Patient's blood pressure in office 150/71. She always ahs high readings in office, but stable at home. Patient reports her blood pressure at home usually runs 130s over 70s.  Patient denies any adverse effects of medication.No CP, HA, blurry vision, SOA.      Diabetes type 2-patient is currently taking Farxiga 10 mg and Toujeo.  Patient is followed by diabetes management, BRISSA Carey.  Patient reports doing well medication.  Denies any adverse effects. Last A1c was at 7.5.      Hypothyroidism-patient is currently on levothyroxine 112 mcg.  Reports doing well medication.  Denies any adverse effects.  She is due updated TSH and T4.     Anxiety-patient is currently taking Paxil 20 mg.  Patient reports controls her anxiety well.  Denies any adverse effects.     Hyperlipidemia-patient is currently on Zocor 40 mg.  Patient reports doing well medication.  Denies any adverse effects.  Updated lipid panel today.     Patient is established with sleep medicine doctor Dr. Leggett.  Patient reports she uses CPAP nightly.    She also c/o right knee and hip pain. Fell last July and landed on right side. Pain has progressively getting worse. She now has some instability of the right knee. Pain is worse with walking and getting into her SUV. She takes an Advil if pain is severe that does help her. She would like to be evaluated due to the instability of her knee/knee giving out.      Patient is due labs. Orders placed at today's visit. Discussed with patient that these are fasting labs.      Discussed with patient vaccines that she is eligible for to include pneumonia, RSV. She ia  agreeable to pneumonia in office today, she can get RSV at pharmacy.      No other questions or concerns today.         Past Medical History:   Diagnosis Date    Abnormal bone density screening 03/2019    Normal    Ankle pain     Anxiety     Arthritis     Bunion     Cervical disc disorder     Corns and callus     CTS (carpal tunnel syndrome)     Diabetes mellitus, type 2     Hammertoe     Hyperglycemia     Hyperlipidemia     Hypertension     Hypothyroidism     Impaired fasting glucose 08/19/2014    Low back pain     Lumbosacral disc disease     Panic disorder     Pap smear for cervical cancer screening 2019    NL PER PT SE[place] DR. PALOMINO [place]    PONV (postoperative nausea and vomiting)     Screening mammogram, encounter for 05/2020    NORMAL DR. PALOMINO [place]    Sensorineural hearing loss (SNHL) of both ears     Sleep apnea 08/26/2015    Sleep apnea     Thoracic disc disorder       Family History   Problem Relation Age of Onset    Heart disease Mother     Heart failure Mother     Hypertension Mother     Diabetes Mother     Arthritis Mother     Hyperlipidemia Mother     Emphysema Father     Heart disease Father     Heart failure Father     COPD Father     Hyperlipidemia Sister     Pancreatic cancer Brother     Hyperlipidemia Brother     Diabetes Brother     Heart disease Maternal Grandmother     Heart disease Maternal Grandfather     Stroke Other     Heart disease Other     Malig Hyperthermia Neg Hx       Past Surgical History:   Procedure Laterality Date    CARPAL TUNNEL INJECTION  2002 2004    CARPAL TUNNEL RELEASE      CATARACT EXTRACTION WITH INTRAOCULAR LENS IMPLANT  2012    RIGHT EYE    CHOLECYSTECTOMY  1982    COLONOSCOPY  02/2017    POLYPS, TUBULAR ADENOMA    EPIDURAL BLOCK      LUMBAR LAMINECTOMY Left 9/20/2023    Procedure: MINIMALLY INVASIVE LUMBAR LAMINECTOMY, left approach, lumbar 3-lumbar 4;  Surgeon: Sb Jones MD;  Location: [facility] MAIN OR;  Service: Neurosurgery;   "Laterality: Left;    ROTATOR CUFF REPAIR  06/2013    Left    THYROIDECTOMY, PARTIAL  1997    Rt thyroid d/t growth-bengin    WRIST SURGERY  2008        Current Outpatient Medications:     BD Pen Needle Juana 2nd Gen 32G X 4 MM misc, USE 1 PEN NEEDLE DAILY ONCE IN THE MORNING, Disp: 90 each, Rfl: 3    bisoprolol (ZEBeta) 5 MG tablet, Take 0.5 tablets by mouth Daily., Disp: 90 tablet, Rfl: 1    Blood Glucose Monitoring Suppl (OneTouch Verio Flex System) w/Device kit, Use 1 each 2 (Two) Times a Day., Disp: 1 kit, Rfl: 0    Farxiga 10 MG tablet, Take 10 mg by mouth Daily for 180 days., Disp: 90 tablet, Rfl: 1    glucose blood test strip, Test blood glucose 2 times each day, Disp: 200 each, Rfl: 5    ipratropium (ATROVENT) 0.06 % nasal spray, Administer 2 sprays into the nostril(s) as directed by provider Daily., Disp: 15 mL, Rfl: 2    Lancets misc, Test blood glucose 2 times each day, Disp: 200 each, Rfl: 5    latanoprost (XALATAN) 0.005 % ophthalmic solution, Administer 1 drop to both eyes Every Night., Disp: , Rfl:     levothyroxine (SYNTHROID, LEVOTHROID) 112 MCG tablet, Take 1 tablet by mouth Every Morning., Disp: 90 tablet, Rfl: 1    PARoxetine (PAXIL) 20 MG tablet, TAKE 1 TABLET EVERY MORNING, Disp: 90 tablet, Rfl: 3    simvastatin (ZOCOR) 40 MG tablet, Take 1 tablet by mouth Every Night., Disp: 90 tablet, Rfl: 1    Toujeo Max SoloStar 300 UNIT/ML solution pen-injector injection, Inject 50 Units under the skin into the appropriate area as directed Daily for 90 days., Disp: 15 mL, Rfl: 1    OBJECTIVE  Vital Signs:   /75 Comment: home reading  Pulse 82   Ht 160 cm (63\")   Wt 103 kg (226 lb 9.6 oz)   SpO2 93%   BMI 40.14 kg/m²    Estimated body mass index is 40.14 kg/m² as calculated from the following:    Height as of this encounter: 160 cm (63\").    Weight as of this encounter: 103 kg (226 lb 9.6 oz).     Wt Readings from Last 3 Encounters:   04/18/25 103 kg (226 lb 9.6 oz)   02/13/25 102 kg (225 lb 14.4 " oz)   02/05/25 102 kg (225 lb 9.6 oz)     BP Readings from Last 3 Encounters:   04/18/25 135/75   02/13/25 139/67   02/05/25 144/57       Physical Exam  Vitals reviewed.   Constitutional:       General: She is not in acute distress.     Appearance: She is not ill-appearing.   HENT:      Head: Normocephalic and atraumatic.   Eyes:      Conjunctiva/sclera: Conjunctivae normal.   Cardiovascular:      Rate and Rhythm: Normal rate and regular rhythm.      Heart sounds: Normal heart sounds.   Pulmonary:      Effort: Pulmonary effort is normal.      Breath sounds: Normal breath sounds.   Musculoskeletal:      Cervical back: Normal range of motion.      Right hip: Tenderness present. No deformity.      Right knee: No swelling or deformity. Normal range of motion. Tenderness present over the medial joint line and lateral joint line.        Legs:    Neurological:      Mental Status: She is alert and oriented to person, place, and time.   Psychiatric:         Mood and Affect: Mood normal.         Behavior: Behavior normal.         Thought Content: Thought content normal.         Judgment: Judgment normal.          Result Review    Common labs          9/18/2024    08:24 11/4/2024    10:31 2/13/2025    08:29   Common Labs   Glucose  130     BUN  15     Creatinine  0.65     Sodium  141     Potassium  4.7     Chloride  103     Calcium  9.6     Albumin  4.2     Total Bilirubin  0.4     Alkaline Phosphatase  106     AST (SGOT)  16     ALT (SGPT)  16     WBC  8.11     Hemoglobin  15.2     Hematocrit  45.8     Platelets  336     Total Cholesterol  164     Triglycerides  134     HDL Cholesterol  55     LDL Cholesterol   86     Hemoglobin A1C 7.6   7.5        No Images in the past 120 days found..      The above data has been reviewed by BRISSA Ruvalcaba 04/18/2025 09:18 EDT.          Patient Care Team:  Oneyda Taylor APRN as PCP - General (Nurse Practitioner)  Elena Nuñez APRN as Nurse Practitioner (Nurse  Practitioner)  Janak Leggett MD as Consulting Physician (Sleep Medicine)    Class 3 Severe Obesity (BMI >=40). Obesity-related health conditions include the following: obstructive sleep apnea, hypertension, diabetes mellitus, and dyslipidemias. Obesity is unchanged. BMI is is above average; BMI management plan is completed. We discussed low calorie, low carb based diet program, portion control, increasing exercise, and Information on healthy weight added to patient's after visit summary.       ASSESSMENT & PLAN    Diagnoses and all orders for this visit:    1. Primary hypertension (Primary)  Comments:  stable at home, keep bp log, continue current medications, 6 month follow up  Orders:  -     Comprehensive Metabolic Panel; Future  -     Lipid Panel; Future    2. Hypothyroidism, unspecified type  Comments:  stable, continue levothyroxine 112 mcg.  Orders:  -     TSH+Free T4; Future    3. Hyperlipidemia, unspecified hyperlipidemia type  Comments:  stable, continue zocor 40 mg.  Orders:  -     Comprehensive Metabolic Panel; Future  -     Lipid Panel; Future    4. Anxiety  Comments:  stable on Paxil 20 mg, continue    5. Type 2 diabetes mellitus with hyperglycemia, with long-term current use of insulin  Comments:  improving, continue to see specialist for management  Orders:  -     Comprehensive Metabolic Panel; Future  -     Microalbumin / Creatinine Urine Ratio - Urine, Clean Catch; Future    6. Environmental allergies  Comments:  refilled atrovent nasal spray  Orders:  -     ipratropium (ATROVENT) 0.06 % nasal spray; Administer 2 sprays into the nostril(s) as directed by provider Daily.  Dispense: 15 mL; Refill: 2    7. Chronic pain of right knee  Comments:  ice/heat, knee brace, xrays ordered, may need ortho/MRI, Advil as needed  Orders:  -     XR Knee 1 or 2 View Right; Future    8. Chronic right hip pain  Comments:  ice/heat, xrays ordered, may need ortho, advil as needed  Orders:  -     XR Hip With or  Without Pelvis 2 - 3 View Right; Future         Tobacco Use: Low Risk  (4/18/2025)    Patient History     Smoking Tobacco Use: Never     Smokeless Tobacco Use: Never     Passive Exposure: Never       Follow Up     Return in about 6 months (around 10/18/2025) for Medicare Wellness.      Patient was given instructions and counseling regarding her condition or for health maintenance advice. Please see specific information pulled into the AVS if appropriate.   I have reviewed information obtained and documented by others and I have confirmed the accuracy of this documented note.    Oneyda Taylor, APRN

## 2025-04-22 ENCOUNTER — PATIENT MESSAGE (OUTPATIENT)
Dept: FAMILY MEDICINE CLINIC | Facility: CLINIC | Age: 80
End: 2025-04-22
Payer: MEDICARE

## 2025-04-22 DIAGNOSIS — Z91.09 ENVIRONMENTAL ALLERGIES: ICD-10-CM

## 2025-04-22 RX ORDER — IPRATROPIUM BROMIDE 42 UG/1
2 SPRAY, METERED NASAL DAILY
Qty: 15 ML | Refills: 2 | Status: SHIPPED | OUTPATIENT
Start: 2025-04-22

## 2025-04-30 ENCOUNTER — HOSPITAL ENCOUNTER (OUTPATIENT)
Dept: GENERAL RADIOLOGY | Facility: HOSPITAL | Age: 80
Discharge: HOME | End: 2025-04-30
Payer: MEDICARE

## 2025-04-30 ENCOUNTER — LAB (OUTPATIENT)
Dept: LAB | Facility: HOSPITAL | Age: 80
End: 2025-04-30
Payer: MEDICARE

## 2025-04-30 DIAGNOSIS — G89.29 CHRONIC PAIN OF RIGHT KNEE: ICD-10-CM

## 2025-04-30 DIAGNOSIS — G89.29 CHRONIC RIGHT HIP PAIN: ICD-10-CM

## 2025-04-30 DIAGNOSIS — E03.9 HYPOTHYROIDISM, UNSPECIFIED TYPE: ICD-10-CM

## 2025-04-30 DIAGNOSIS — M25.561 CHRONIC PAIN OF RIGHT KNEE: ICD-10-CM

## 2025-04-30 DIAGNOSIS — I10 PRIMARY HYPERTENSION: ICD-10-CM

## 2025-04-30 DIAGNOSIS — E78.5 HYPERLIPIDEMIA, UNSPECIFIED HYPERLIPIDEMIA TYPE: ICD-10-CM

## 2025-04-30 DIAGNOSIS — M25.551 CHRONIC RIGHT HIP PAIN: ICD-10-CM

## 2025-04-30 DIAGNOSIS — E11.65 TYPE 2 DIABETES MELLITUS WITH HYPERGLYCEMIA, WITH LONG-TERM CURRENT USE OF INSULIN: ICD-10-CM

## 2025-04-30 DIAGNOSIS — Z79.4 TYPE 2 DIABETES MELLITUS WITH HYPERGLYCEMIA, WITH LONG-TERM CURRENT USE OF INSULIN: ICD-10-CM

## 2025-04-30 LAB
ALBUMIN SERPL-MCNC: 4 G/DL (ref 3.5–5.2)
ALBUMIN UR-MCNC: 2 MG/DL
ALBUMIN/GLOB SERPL: 1.4 G/DL
ALP SERPL-CCNC: 96 U/L (ref 39–117)
ALT SERPL W P-5'-P-CCNC: 14 U/L (ref 1–33)
ANION GAP SERPL CALCULATED.3IONS-SCNC: 10 MMOL/L (ref 5–15)
AST SERPL-CCNC: 20 U/L (ref 1–32)
BILIRUB SERPL-MCNC: 0.3 MG/DL (ref 0–1.2)
BUN SERPL-MCNC: 11 MG/DL (ref 8–23)
BUN/CREAT SERPL: 16.7 (ref 7–25)
CALCIUM SPEC-SCNC: 9.7 MG/DL (ref 8.6–10.5)
CHLORIDE SERPL-SCNC: 106 MMOL/L (ref 98–107)
CHOLEST SERPL-MCNC: 167 MG/DL (ref 0–200)
CO2 SERPL-SCNC: 24 MMOL/L (ref 22–29)
CREAT SERPL-MCNC: 0.66 MG/DL (ref 0.57–1)
CREAT UR-MCNC: 113.5 MG/DL
EGFRCR SERPLBLD CKD-EPI 2021: 90.5 ML/MIN/1.73
GLOBULIN UR ELPH-MCNC: 2.9 GM/DL
GLUCOSE SERPL-MCNC: 131 MG/DL (ref 65–99)
HDLC SERPL-MCNC: 56 MG/DL (ref 40–60)
LDLC SERPL CALC-MCNC: 85 MG/DL (ref 0–100)
LDLC/HDLC SERPL: 1.45 {RATIO}
MICROALBUMIN/CREAT UR: 17.6 MG/G (ref 0–29)
POTASSIUM SERPL-SCNC: 4.1 MMOL/L (ref 3.5–5.2)
PROT SERPL-MCNC: 6.9 G/DL (ref 6–8.5)
SODIUM SERPL-SCNC: 140 MMOL/L (ref 136–145)
T4 FREE SERPL-MCNC: 1.25 NG/DL (ref 0.92–1.68)
TRIGL SERPL-MCNC: 148 MG/DL (ref 0–150)
TSH SERPL DL<=0.05 MIU/L-ACNC: 1.3 UIU/ML (ref 0.27–4.2)
VLDLC SERPL-MCNC: 26 MG/DL (ref 5–40)

## 2025-04-30 PROCEDURE — 80061 LIPID PANEL: CPT

## 2025-04-30 PROCEDURE — 82570 ASSAY OF URINE CREATININE: CPT

## 2025-04-30 PROCEDURE — 73560 X-RAY EXAM OF KNEE 1 OR 2: CPT

## 2025-04-30 PROCEDURE — 36415 COLL VENOUS BLD VENIPUNCTURE: CPT

## 2025-04-30 PROCEDURE — 84443 ASSAY THYROID STIM HORMONE: CPT

## 2025-04-30 PROCEDURE — 80053 COMPREHEN METABOLIC PANEL: CPT

## 2025-04-30 PROCEDURE — 82043 UR ALBUMIN QUANTITATIVE: CPT

## 2025-04-30 PROCEDURE — 84439 ASSAY OF FREE THYROXINE: CPT

## 2025-04-30 PROCEDURE — 73502 X-RAY EXAM HIP UNI 2-3 VIEWS: CPT

## 2025-05-12 ENCOUNTER — OFFICE VISIT (OUTPATIENT)
Dept: ORTHOPEDIC SURGERY | Facility: CLINIC | Age: 80
End: 2025-05-12
Payer: MEDICARE

## 2025-05-12 VITALS
HEART RATE: 93 BPM | HEIGHT: 63 IN | DIASTOLIC BLOOD PRESSURE: 77 MMHG | OXYGEN SATURATION: 92 % | BODY MASS INDEX: 40.04 KG/M2 | WEIGHT: 226 LBS | SYSTOLIC BLOOD PRESSURE: 198 MMHG

## 2025-05-12 DIAGNOSIS — M16.11 PRIMARY OSTEOARTHRITIS OF RIGHT HIP: Primary | ICD-10-CM

## 2025-05-12 DIAGNOSIS — E66.01 OBESITY, MORBID, BMI 40.0-49.9: ICD-10-CM

## 2025-05-12 DIAGNOSIS — M17.11 PRIMARY OSTEOARTHRITIS OF RIGHT KNEE: ICD-10-CM

## 2025-05-12 PROCEDURE — 99203 OFFICE O/P NEW LOW 30 MIN: CPT | Performed by: STUDENT IN AN ORGANIZED HEALTH CARE EDUCATION/TRAINING PROGRAM

## 2025-05-12 PROCEDURE — 3077F SYST BP >= 140 MM HG: CPT | Performed by: STUDENT IN AN ORGANIZED HEALTH CARE EDUCATION/TRAINING PROGRAM

## 2025-05-12 PROCEDURE — 3078F DIAST BP <80 MM HG: CPT | Performed by: STUDENT IN AN ORGANIZED HEALTH CARE EDUCATION/TRAINING PROGRAM

## 2025-05-12 PROCEDURE — 1160F RVW MEDS BY RX/DR IN RCRD: CPT | Performed by: STUDENT IN AN ORGANIZED HEALTH CARE EDUCATION/TRAINING PROGRAM

## 2025-05-12 PROCEDURE — 1159F MED LIST DOCD IN RCRD: CPT | Performed by: STUDENT IN AN ORGANIZED HEALTH CARE EDUCATION/TRAINING PROGRAM

## 2025-05-12 NOTE — PROGRESS NOTES
Chief Complaint  Pain and Initial Evaluation of the Right Hip and Pain and Initial Evaluation of the Right Knee    Subjective          Belem Acosta presents to [facility] for an evaluation  of her right hip and right knee.     History of Present Illness    The patient presents here today for an evaluation  of her right hip and right knee. Her right hip and knee have been bothering her for several years. She had a fall last July when he right knee buckled and gave out on her. She denies any prior surgery to her right hip or knee. She locates her pain to the lateral  aspect of her hip and medial  aspect of her knee. She has pain with prolonged walking.  She has had prior hip bursitis injections in the past with some relief. She has had prior surgery on her back.     Allergies   Allergen Reactions    Metformin Nausea And Vomiting and Unknown - Low Severity    Januvia [Sitagliptin] GI Intolerance     Nausea and vomiting    Lisinopril Cough        Social History     Socioeconomic History    Marital status:    Tobacco Use    Smoking status: Never     Passive exposure: Never    Smokeless tobacco: Never   Vaping Use    Vaping status: Never Used   Substance and Sexual Activity    Alcohol use: Yes     Alcohol/week: 3.0 standard drinks of alcohol     Types: 3 Glasses of wine per week     Comment: Rarely    Drug use: Never    Sexual activity: Not Currently     Partners: Male        I reviewed the patient's chief complaint, history of present illness, review of systems, past medical history, surgical history, family history, social history, medications, and allergy list.     REVIEW OF SYSTEMS    Constitutional: Denies fevers, chills, weight loss  Cardiovascular: Denies chest pain, shortness of breath  Skin: Denies rashes, acute skin changes  Neurologic: Denies headache, loss of consciousness  MSK: right hip and right knee pain       Objective   Vital Signs:   BP (!) 198/77   Pulse 93    "Ht 160 cm (63\")   Wt 103 kg (226 lb)   SpO2 92%   BMI 40.03 kg/m²     Body mass index is 40.03 kg/m².    Physical Exam    General: Alert. No acute distress.   Right lower extremity: equal leg lengths, hip flexion  to 90 degrees, internal rotation to neutral, external rotation to 20 degrees, full extension to the knee, flexion  to 120 degrees, stable to varus/valgus stress, stable Lachman's and posterior  drawer, 5/5 hip flexion  and abduction, negative  Rosario's, non tender to the medial joint line  and lateral joint line, distal neurovascularly intact, positive  pulses, positive EHL, FHL, GS, and TA. Sensation intact to all 5 nerves of the foot, antalgic gait      Procedures    Imaging Results (Most Recent)       None                     Assessment and Plan        XR Hip With or Without Pelvis 2 - 3 View Right  Result Date: 5/2/2025  Narrative: XR HIP W OR WO PELVIS 2-3 VIEW RIGHT Date of Exam: 4/30/2025 11:04 AM EDT Indication: right hip pain, fall x 9 months ago Comparison: None available. Findings: Evaluation is limited by overlapping soft tissues. The right femoral neck appears foreshortened compared to the left without a well visualized fracture line. Given patient's history of injury 9 months ago, this could be due to a chronic fracture. There is also asymmetric severe superior right hip joint space narrowing and osteophyte formation consistent with advanced osteoarthritis. Advanced arthropathy could also contribute to the appearance of the femoral neck. No other definite acute displaced fracture. Mild left hip osteoarthritis. Mild enthesophyte formation. Degenerative changes in the lower lumbar spine.     Impression: Impression: 1.Asymmetric foreshortened appearance of the right femoral neck without a well visualized fracture line. Given patient's history of injury 9 months ago, this could be due to a prior fracture. There is also asymmetric severe right hip osteoarthritis which  could contribute to " this appearance. 2.Orthopedic surgery referral is recommended. MRI more CT could be performed for additional assessment as indicated. Electronically Signed: Fredo Valentin  5/2/2025 5:24 PM EDT  Workstation ID: [id number]    XR Knee 1 or 2 View Right  Result Date: 5/2/2025  Narrative: XR KNEE 1 OR 2 VW RIGHT Date of Exam: 4/30/2025 11:04 AM EDT Indication: right knee pain Comparison: None available. FINDINGS:  No fracture is identified. Mineralization and alignment appear within normal limits. Small calcifications at the medial and lateral meniscus. No definite knee effusion. Mild osteophyte formation. There is suspected mild medial tibiofemoral and patellofemoral joint space narrowing.     Impression: 1.Mild knee arthropathy with suspected mild medial tibiofemoral and patellofemoral joint space narrowing. 2.Small calcifications at the medial and lateral meniscus which could be seen with calcium pyrophosphate deposition disease or osteoarthritis. Electronically Signed: Fredo Gomezlondon  5/2/2025 5:10 PM EDT  Workstation ID: [id number]       Diagnoses and all orders for this visit:    1. Primary osteoarthritis of right hip (Primary)    2. Primary osteoarthritis of right knee    3. Obesity, morbid, BMI 40.0-49.9        The patient presents here today for an evaluation  of her right hip and right knee.     Order placed today for a right hip intra articular steroid injection with radiology.     Home exercises given today and will continue over the counter medications for pain control.       Due to the patients high blood pressure reading today, I advised the patient to contact their PCP.      Educated on risk of elevated BMI.  Discussed options for weight loss/decreasing BMI prior to procedure including dietician consult, weight loss options and exercise program. and Call or return if worsening symptoms.    Scribed for Jose Flores MD by Daxa Garibay  05/12/2025   09:21 EDT         Follow Up     3 months with repeat x-rays to  her right hip.     Patient was given instructions and counseling regarding her condition or for health maintenance advice. Please see specific information pulled into the AVS if appropriate.         I have personally performed the services described in this document as scribed by the above individual and it is both accurate and complete. Jose Flores MD 05/12/25 10:36 EDT

## 2025-05-13 ENCOUNTER — OFFICE VISIT (OUTPATIENT)
Dept: DIABETES SERVICES | Facility: HOSPITAL | Age: 80
End: 2025-05-13
Payer: MEDICARE

## 2025-05-13 VITALS
DIASTOLIC BLOOD PRESSURE: 60 MMHG | SYSTOLIC BLOOD PRESSURE: 130 MMHG | WEIGHT: 226 LBS | OXYGEN SATURATION: 97 % | HEART RATE: 76 BPM | HEIGHT: 63 IN | BODY MASS INDEX: 40.04 KG/M2

## 2025-05-13 DIAGNOSIS — E11.65 TYPE 2 DIABETES MELLITUS WITH HYPERGLYCEMIA, WITH LONG-TERM CURRENT USE OF INSULIN: Primary | ICD-10-CM

## 2025-05-13 DIAGNOSIS — E11.9 TYPE 2 DIABETES MELLITUS WITHOUT COMPLICATION, WITH LONG-TERM CURRENT USE OF INSULIN: ICD-10-CM

## 2025-05-13 DIAGNOSIS — Z79.4 TYPE 2 DIABETES MELLITUS WITH HYPERGLYCEMIA, WITH LONG-TERM CURRENT USE OF INSULIN: Primary | ICD-10-CM

## 2025-05-13 DIAGNOSIS — Z79.4 TYPE 2 DIABETES MELLITUS WITHOUT COMPLICATION, WITH LONG-TERM CURRENT USE OF INSULIN: ICD-10-CM

## 2025-05-13 DIAGNOSIS — E66.01 SEVERE OBESITY (BMI >= 40): ICD-10-CM

## 2025-05-13 DIAGNOSIS — E11.65 UNCONTROLLED TYPE 2 DIABETES MELLITUS WITH HYPERGLYCEMIA: ICD-10-CM

## 2025-05-13 LAB
EXPIRATION DATE: ABNORMAL
GLUCOSE BLDC GLUCOMTR-MCNC: 114 MG/DL (ref 70–99)
HBA1C MFR BLD: 7.3 % (ref 4.5–5.7)
Lab: ABNORMAL

## 2025-05-13 PROCEDURE — 82948 REAGENT STRIP/BLOOD GLUCOSE: CPT | Performed by: NURSE PRACTITIONER

## 2025-05-13 PROCEDURE — 3075F SYST BP GE 130 - 139MM HG: CPT | Performed by: NURSE PRACTITIONER

## 2025-05-13 PROCEDURE — 1159F MED LIST DOCD IN RCRD: CPT | Performed by: NURSE PRACTITIONER

## 2025-05-13 PROCEDURE — 83036 HEMOGLOBIN GLYCOSYLATED A1C: CPT | Performed by: NURSE PRACTITIONER

## 2025-05-13 PROCEDURE — 99214 OFFICE O/P EST MOD 30 MIN: CPT | Performed by: NURSE PRACTITIONER

## 2025-05-13 PROCEDURE — G0463 HOSPITAL OUTPT CLINIC VISIT: HCPCS | Performed by: NURSE PRACTITIONER

## 2025-05-13 PROCEDURE — 3078F DIAST BP <80 MM HG: CPT | Performed by: NURSE PRACTITIONER

## 2025-05-13 PROCEDURE — 1160F RVW MEDS BY RX/DR IN RCRD: CPT | Performed by: NURSE PRACTITIONER

## 2025-05-13 NOTE — PROGRESS NOTES
Chief Complaint  Diabetes (Follow up, med mgt, A1c eval)    Referred By: BRISSA Ruvalcaba    Patient or patient representative verbalized consent for the use of Ambient Listening during the visit with  BRISSA Melvin for chart documentation. 5/13/2025  08:35 EDT    Subjective          Belem Acosta presents to [facility] for diabetes medication management    History of Present Illness    History of Present Illness  The patient presents today to follow up on her diabetes.    She reports satisfactory control of her blood glucose levels, with morning readings ranging from 107 to 136 over the past three days. She monitors her blood glucose once daily or more frequently if she experiences any unusual symptoms. She is not experiencing any polyuria, polydipsia, changes in appetite, blurred vision, or fatigue. She has been more physically active recently, engaging in yard work. Her current medication regimen includes Toujeo 50 units daily and Farxiga 10 mg daily.    She also reports having arthritis and visited an orthopedic doctor on 05/12/2025, where she received an x-ray guided steroid injection in her hip. She is aware that her blood sugar may increase due to the steroid injection but notes that it has been manageable in the past.         Visit type:  follow-up  Diabetes type:  Type 2  Current diabetes status/concerns/issues: Reports her fasting blood sugars running 107 to 136 mg/dL  Other health concerns: No new health concerns  Current Diabetes symptoms:    Polyuria: No   Polydipsia: No   Polyphagia: No   Blurred vision: No   Excessive fatigue: No  Known Diabetes complications:  Neuro: None  Renal: None  Eyes: She has an eye exam twice yr with  8/1/24  Amputation/Wounds: None  GI: None  Cardiovascular: HTN, Hyperlipidemia  ED: N/A  Other: None  Hypoglycemia:  None reported at this time  Hypoglycemia Symptoms:  No hypoglycemia at this time  Current diabetes  treatment:  Toujeo 50 units qd,  farxiga 10mg qd   Blood glucose device:  Meter  Blood glucose monitoring frequency:  1  Blood glucose range/average:   106-136mg/dl.   Glucose Source: Patient Reported  Diet:  Limits high carb/sweet foods, Avoids sugary drinks, Number of meals each day - 3; Number of snacks each day - 1.   Activity/Exercise:  states she just started walking again. She is walking up and down stairs since the weather has been bad outdoor    Past Medical History:   Diagnosis Date    Abnormal bone density screening 03/2019    Normal    Ankle pain     Anxiety     Arthritis     Bunion     Cervical disc disorder     Corns and callus     CTS (carpal tunnel syndrome)     Diabetes mellitus, type 2     Hammertoe     Hyperglycemia     Hyperlipidemia     Hypertension     Hypothyroidism     Impaired fasting glucose 08/19/2014    Low back pain     Lumbosacral disc disease     Panic disorder     Pap smear for cervical cancer screening 2019    [place]    PONV (postoperative nausea and vomiting)     Screening mammogram, encounter for 05/2020    NORMAL DR. PALOMINO IN [place]    Sensorineural hearing loss (SNHL) of both ears     Sleep apnea 08/26/2015    Sleep apnea     Thoracic disc disorder      Past Surgical History:   Procedure Laterality Date    CARPAL TUNNEL INJECTION  2002 2004    CARPAL TUNNEL RELEASE      CATARACT EXTRACTION WITH INTRAOCULAR LENS IMPLANT  2012    RIGHT EYE    CHOLECYSTECTOMY  1982    COLONOSCOPY  02/2017    POLYPS, TUBULAR ADENOMA    EPIDURAL BLOCK      LUMBAR LAMINECTOMY Left 9/20/2023    Procedure: MINIMALLY INVASIVE LUMBAR LAMINECTOMY, left approach, lumbar 3-lumbar 4;  Surgeon: Sb Jones MD;  Location: [facility];  Service: Neurosurgery;  Laterality: Left;    ROTATOR CUFF REPAIR  06/2013    Left    THYROIDECTOMY, PARTIAL  1997    Rt thyroid d/t growth-bengin    WRIST SURGERY  2008     Family History   Problem Relation Age of Onset    Heart  disease Mother     Heart failure Mother     Hypertension Mother     Diabetes Mother     Arthritis Mother     Hyperlipidemia Mother     Emphysema Father     Heart disease Father     Heart failure Father     COPD Father     Hyperlipidemia Sister     Pancreatic cancer Brother     Hyperlipidemia Brother     Diabetes Brother     Heart disease Maternal Grandmother     Heart disease Maternal Grandfather     Stroke Other     Heart disease Other     Malig Hyperthermia Neg Hx      Social History     Socioeconomic History    Marital status:    Tobacco Use    Smoking status: Never     Passive exposure: Never    Smokeless tobacco: Never   Vaping Use    Vaping status: Never Used   Substance and Sexual Activity    Alcohol use: Yes     Alcohol/week: 3.0 standard drinks of alcohol     Types: 3 Glasses of wine per week     Comment: Rarely    Drug use: Never    Sexual activity: Not Currently     Partners: Male     Allergies   Allergen Reactions    Metformin Nausea And Vomiting and Unknown - Low Severity    Januvia [Sitagliptin] GI Intolerance     Nausea and vomiting    Lisinopril Cough       Current Outpatient Medications:     BD Pen Needle [place] 2nd Gen 32G X 4 MM misc, USE 1 PEN NEEDLE DAILY ONCE IN THE MORNING, Disp: 90 each, Rfl: 3    bisoprolol (ZEBeta) 5 MG tablet, Take 0.5 tablets by mouth Daily., Disp: 90 tablet, Rfl: 1    Blood Glucose Monitoring Suppl (OneTouch Verio Flex System) w/Device kit, Use 1 each 2 (Two) Times a Day., Disp: 1 kit, Rfl: 0    Farxiga 10 MG tablet, Take 10 mg by mouth Daily for 180 days., Disp: 90 tablet, Rfl: 1    glucose blood test strip, Test blood glucose 2 times each day, Disp: 200 each, Rfl: 5    ipratropium (ATROVENT) 0.06 % nasal spray, Administer 2 sprays into the nostril(s) as directed by provider Daily., Disp: 15 mL, Rfl: 2    Lancets misc, Test blood glucose 2 times each day, Disp: 200 each, Rfl: 5    latanoprost (XALATAN) 0.005 % ophthalmic solution, Administer 1 drop to both eyes  "Every Night., Disp: , Rfl:     levothyroxine (SYNTHROID, LEVOTHROID) 112 MCG tablet, Take 1 tablet by mouth Every Morning., Disp: 90 tablet, Rfl: 1    PARoxetine (PAXIL) 20 MG tablet, TAKE 1 TABLET EVERY MORNING, Disp: 90 tablet, Rfl: 3    simvastatin (ZOCOR) 40 MG tablet, Take 1 tablet by mouth Every Night., Disp: 90 tablet, Rfl: 1    Toujeo Max SoloStar 300 UNIT/ML solution pen-injector injection, Inject 50 Units under the skin into the appropriate area as directed Daily for 90 days., Disp: 15 mL, Rfl: 1    Objective     Vitals:    05/13/25 0800   BP: 130/60   Pulse: 76   SpO2: 97%   Weight: 103 kg (226 lb)   Height: 160 cm (63\")   PainSc: 0-No pain     Body mass index is 40.03 kg/m².    Physical Exam  Constitutional:       Appearance: Normal appearance. She is obese.      Comments: Severe Obesity (BMI >= 40) Pt Current BMI = 40.03     HENT:      Head: Normocephalic and atraumatic.      Right Ear: External ear normal.      Left Ear: External ear normal.      Nose: Nose normal.   Eyes:      Extraocular Movements: Extraocular movements intact.      Conjunctiva/sclera: Conjunctivae normal.   Pulmonary:      Effort: Pulmonary effort is normal.   Musculoskeletal:         General: Normal range of motion.      Cervical back: Normal range of motion.   Skin:     General: Skin is warm and dry.   Neurological:      General: No focal deficit present.      Mental Status: She is alert and oriented to person, place, and time. Mental status is at baseline.   Psychiatric:         Mood and Affect: Mood normal.         Behavior: Behavior normal.         Thought Content: Thought content normal.         Judgment: Judgment normal.             Result Review :   The following data was reviewed by: BRISSA Melvin on 05/13/2025:    Most Recent A1C          5/13/2025    08:12   HGBA1C Most Recent   Hemoglobin A1C 7.3        A1C Last 3 Results          9/18/2024    08:24 2/13/2025    08:29 5/13/2025    08:12   HGBA1C Last 3 " Results   Hemoglobin A1C 7.6  7.5  7.3      A1c collected in the office today is 7.3%, indicating Uncontrolled Type II diabetes.  This result is down from the prior result of 7.5% collected on 2/13/25.     Glucose   Date Value Ref Range Status   05/13/2025 114 (H) 70 - 99 mg/dL Final     Comment:     Serial Number: [id number]:  [id number]     Point of care glucose in the office today is within normal limits for nonfasting glucose    Creatinine   Date Value Ref Range Status   04/30/2025 0.66 0.57 - 1.00 mg/dL Final   11/04/2024 0.65 0.57 - 1.00 mg/dL Final     eGFR   Date Value Ref Range Status   04/30/2025 90.5 >60.0 mL/min/1.73 Final   11/04/2024 90.8 >60.0 mL/min/1.73 Final     Labs collected on 4/30/25 show Normal values    Microalbumin, Urine   Date Value Ref Range Status   04/30/2025 2.0 mg/dL Final   04/16/2024 <1.2 mg/dL Final     Creatinine, Urine   Date Value Ref Range Status   04/30/2025 113.5 mg/dL Final   04/16/2024 79.6 mg/dL Final     Microalbumin/Creatinine Ratio   Date Value Ref Range Status   04/30/2025 17.6 0.0 - 29.0 mg/g Final   04/16/2024   Final     Comment:     Unable to calculate     Urine microalbuminuria collected on 4/30/25 is negative for microalbuminuria    Total Cholesterol   Date Value Ref Range Status   04/30/2025 167 0 - 200 mg/dL Final   11/04/2024 164 0 - 200 mg/dL Final     Triglycerides   Date Value Ref Range Status   04/30/2025 148 0 - 150 mg/dL Final   11/04/2024 134 0 - 150 mg/dL Final     HDL Cholesterol   Date Value Ref Range Status   04/30/2025 56 40 - 60 mg/dL Final   11/04/2024 55 40 - 60 mg/dL Final     LDL Cholesterol    Date Value Ref Range Status   04/30/2025 85 0 - 100 mg/dL Final   11/04/2024 86 0 - 100 mg/dL Final     Lipid panel collected on 4/30/25 shows normal lipid panel              Diagnoses and all orders for this visit:    1. Type 2 diabetes mellitus with hyperglycemia, with long-term current use of insulin (Primary)  -     POC Glycosylated Hemoglobin  (Hb A1C)    2. Uncontrolled type 2 diabetes mellitus with hyperglycemia    3. Severe obesity (BMI >= 40)    4. Type 2 diabetes mellitus without complication, with long-term current use of insulin    Other orders  -     POC Glucose          Assessment & Plan  1. Diabetes Mellitus.  - Her A1c level has shown a slight decrease from 7.5 to 7.3, indicating an improvement in glycemic control.  - Reports no symptoms of frequent thirst, urination, blurred vision, or fatigue. Morning blood sugar levels have been stable, ranging from 107 to 136.  - Kidney function remains normal with no proteinuria, and cholesterol panel is within normal limits.  - Currently on Toujeo 50 units daily and Farxiga 10 mg daily. Advised to monitor blood sugar levels closely, especially after receiving a steroid shot for hip arthritis, as it may cause a spike in blood sugar. If a significant increase is observed, Toujeo dosage may be increased by 3 units.    2. Hip Arthritis.  - Received an x-ray-guided steroid injection in the hip yesterday.  - Advised to inform the radiologist about diabetes to potentially adjust the steroid dosage to avoid significant spikes in blood sugar levels.  - Discussed the possibility of managing blood sugar levels post-injection.    Follow-up  - Follow-up scheduled in 3 months.      The patient will monitor her blood glucose levels 1 time daily.  If she develops problematic hyperglycemia or hypoglycemia or adverse drug reactions, she will contact the office for further instructions.        Follow Up     Return in about 3 months (around 8/13/2025) for Medication Mgmt.    Patient was given instructions and counseling regarding her condition or for health maintenance advice. Please see specific information pulled into the AVS if appropriate.     Elena Nuñez, APRN  05/13/2025      Dictated Utilizing Dragon Dictation.  Please note that portions of this note were completed with a voice recognition program.  Part of  this note may be an electronic transcription/translation of spoken language to printed text using the Dragon Dictation System.

## 2025-05-14 ENCOUNTER — PATIENT ROUNDING (BHMG ONLY) (OUTPATIENT)
Dept: ORTHOPEDIC SURGERY | Facility: CLINIC | Age: 80
End: 2025-05-14
Payer: MEDICARE

## 2025-05-14 DIAGNOSIS — E03.9 HYPOTHYROIDISM, UNSPECIFIED TYPE: ICD-10-CM

## 2025-05-14 NOTE — PROGRESS NOTES
A [employer] message has been sent to the patient for PATIENT ROUNDING with [facility].

## 2025-05-15 RX ORDER — LEVOTHYROXINE SODIUM 112 UG/1
112 TABLET ORAL EVERY MORNING
Qty: 90 TABLET | Refills: 1 | Status: SHIPPED | OUTPATIENT
Start: 2025-05-15

## 2025-05-27 ENCOUNTER — HOSPITAL ENCOUNTER (OUTPATIENT)
Dept: INTERVENTIONAL RADIOLOGY/VASCULAR | Facility: HOSPITAL | Age: 80
Discharge: HOME | End: 2025-05-27
Admitting: STUDENT IN AN ORGANIZED HEALTH CARE EDUCATION/TRAINING PROGRAM
Payer: MEDICARE

## 2025-05-27 DIAGNOSIS — M16.11 PRIMARY OSTEOARTHRITIS OF RIGHT HIP: ICD-10-CM

## 2025-05-27 PROCEDURE — 25010000002 LIDOCAINE 2% SOLUTION: Performed by: STUDENT IN AN ORGANIZED HEALTH CARE EDUCATION/TRAINING PROGRAM

## 2025-05-27 PROCEDURE — 77002 NEEDLE LOCALIZATION BY XRAY: CPT

## 2025-05-27 PROCEDURE — 25010000002 BUPIVACAINE (PF) 0.5 % SOLUTION: Performed by: STUDENT IN AN ORGANIZED HEALTH CARE EDUCATION/TRAINING PROGRAM

## 2025-05-27 PROCEDURE — 25010000002 METHYLPREDNISOLONE PER 80 MG: Performed by: STUDENT IN AN ORGANIZED HEALTH CARE EDUCATION/TRAINING PROGRAM

## 2025-05-27 PROCEDURE — 25510000001 IOPAMIDOL 61 % SOLUTION: Performed by: STUDENT IN AN ORGANIZED HEALTH CARE EDUCATION/TRAINING PROGRAM

## 2025-05-27 RX ORDER — BUPIVACAINE HYDROCHLORIDE 5 MG/ML
5 INJECTION, SOLUTION EPIDURAL; INTRACAUDAL; PERINEURAL ONCE
Status: COMPLETED | OUTPATIENT
Start: 2025-05-27 | End: 2025-05-27

## 2025-05-27 RX ORDER — LIDOCAINE HYDROCHLORIDE 20 MG/ML
20 INJECTION, SOLUTION INFILTRATION; PERINEURAL ONCE
Status: COMPLETED | OUTPATIENT
Start: 2025-05-27 | End: 2025-05-27

## 2025-05-27 RX ORDER — METHYLPREDNISOLONE ACETATE 80 MG/ML
80 INJECTION, SUSPENSION INTRA-ARTICULAR; INTRALESIONAL; INTRAMUSCULAR; SOFT TISSUE ONCE
Status: COMPLETED | OUTPATIENT
Start: 2025-05-27 | End: 2025-05-27

## 2025-05-27 RX ORDER — INDOMETHACIN 25 MG/1
10 CAPSULE ORAL ONCE
Status: COMPLETED | OUTPATIENT
Start: 2025-05-27 | End: 2025-05-27

## 2025-05-27 RX ORDER — IOPAMIDOL 612 MG/ML
15 INJECTION, SOLUTION INTRATHECAL
Status: COMPLETED | OUTPATIENT
Start: 2025-05-27 | End: 2025-05-27

## 2025-05-27 RX ADMIN — LIDOCAINE HYDROCHLORIDE 7 ML: 20 INJECTION, SOLUTION INFILTRATION; PERINEURAL at 09:05

## 2025-05-27 RX ADMIN — IOPAMIDOL 1 ML: 612 INJECTION, SOLUTION INTRATHECAL at 09:06

## 2025-05-27 RX ADMIN — METHYLPREDNISOLONE ACETATE 80 MG: 80 INJECTION, SUSPENSION INTRA-ARTICULAR; INTRALESIONAL; INTRAMUSCULAR; SOFT TISSUE at 09:06

## 2025-05-27 RX ADMIN — BUPIVACAINE HYDROCHLORIDE 4 ML: 5 INJECTION, SOLUTION EPIDURAL; INTRACAUDAL; PERINEURAL at 09:06

## 2025-05-27 RX ADMIN — SODIUM BICARBONATE 1 ML: 84 INJECTION, SOLUTION INTRAVENOUS at 09:05

## 2025-05-28 ENCOUNTER — TELEPHONE (OUTPATIENT)
Dept: ORTHOPEDIC SURGERY | Facility: CLINIC | Age: 80
End: 2025-05-28
Payer: MEDICARE

## 2025-05-28 DIAGNOSIS — M16.11 PRIMARY OSTEOARTHRITIS OF RIGHT HIP: Primary | ICD-10-CM

## 2025-05-28 NOTE — TELEPHONE ENCOUNTER
Caller: REJI    Relationship to patient: SELF    Best call back number: [phone]    Type of visit: HAD RIGHT HIP INJECTION 5/27/25- NEEDS TO SCHEDULE ANOTHER INJECTION AT THE HOSPITAL IN 3 MTHS- PLEASE CALL

## 2025-07-09 ENCOUNTER — OFFICE VISIT (OUTPATIENT)
Dept: FAMILY MEDICINE CLINIC | Facility: CLINIC | Age: 80
End: 2025-07-09
Payer: MEDICARE

## 2025-07-09 VITALS
BODY MASS INDEX: 40.4 KG/M2 | OXYGEN SATURATION: 97 % | HEIGHT: 63 IN | HEART RATE: 80 BPM | WEIGHT: 228 LBS | DIASTOLIC BLOOD PRESSURE: 79 MMHG | SYSTOLIC BLOOD PRESSURE: 186 MMHG

## 2025-07-09 DIAGNOSIS — S46.912A MUSCLE STRAIN, SHOULDER REGION, LEFT, INITIAL ENCOUNTER: Primary | ICD-10-CM

## 2025-07-09 PROCEDURE — 3078F DIAST BP <80 MM HG: CPT

## 2025-07-09 PROCEDURE — 3077F SYST BP >= 140 MM HG: CPT

## 2025-07-09 PROCEDURE — 99213 OFFICE O/P EST LOW 20 MIN: CPT

## 2025-07-09 PROCEDURE — 1160F RVW MEDS BY RX/DR IN RCRD: CPT

## 2025-07-09 PROCEDURE — 1125F AMNT PAIN NOTED PAIN PRSNT: CPT

## 2025-07-09 PROCEDURE — 96372 THER/PROPH/DIAG INJ SC/IM: CPT

## 2025-07-09 PROCEDURE — 1159F MED LIST DOCD IN RCRD: CPT

## 2025-07-09 RX ORDER — KETOROLAC TROMETHAMINE 30 MG/ML
30 INJECTION, SOLUTION INTRAMUSCULAR; INTRAVENOUS ONCE
Status: COMPLETED | OUTPATIENT
Start: 2025-07-09 | End: 2025-07-09

## 2025-07-09 RX ORDER — CYCLOBENZAPRINE HCL 10 MG
10 TABLET ORAL 3 TIMES DAILY PRN
Qty: 21 TABLET | Refills: 1 | Status: SHIPPED | OUTPATIENT
Start: 2025-07-09 | End: 2025-07-09

## 2025-07-09 RX ORDER — AMMONIUM LACTATE 12 G/100G
CREAM TOPICAL
COMMUNITY
Start: 2025-06-16

## 2025-07-09 RX ORDER — CYCLOBENZAPRINE HCL 10 MG
10 TABLET ORAL 3 TIMES DAILY PRN
Qty: 21 TABLET | Refills: 1 | Status: SHIPPED | OUTPATIENT
Start: 2025-07-09

## 2025-07-09 RX ADMIN — KETOROLAC TROMETHAMINE 30 MG: 30 INJECTION, SOLUTION INTRAMUSCULAR; INTRAVENOUS at 15:23

## 2025-08-05 DIAGNOSIS — F41.9 ANXIETY: ICD-10-CM

## 2025-08-06 ENCOUNTER — OFFICE VISIT (OUTPATIENT)
Dept: DIABETES SERVICES | Facility: HOSPITAL | Age: 80
End: 2025-08-06
Payer: MEDICARE

## 2025-08-06 VITALS
BODY MASS INDEX: 39.51 KG/M2 | OXYGEN SATURATION: 96 % | HEIGHT: 63 IN | WEIGHT: 223 LBS | DIASTOLIC BLOOD PRESSURE: 76 MMHG | HEART RATE: 78 BPM | SYSTOLIC BLOOD PRESSURE: 136 MMHG

## 2025-08-06 DIAGNOSIS — E11.65 UNCONTROLLED TYPE 2 DIABETES MELLITUS WITH HYPERGLYCEMIA: ICD-10-CM

## 2025-08-06 DIAGNOSIS — E66.9 OBESITY (BMI 30-39.9): ICD-10-CM

## 2025-08-06 DIAGNOSIS — E11.65 TYPE 2 DIABETES MELLITUS WITH HYPERGLYCEMIA, WITH LONG-TERM CURRENT USE OF INSULIN: Primary | ICD-10-CM

## 2025-08-06 DIAGNOSIS — Z79.4 TYPE 2 DIABETES MELLITUS WITH HYPERGLYCEMIA, WITH LONG-TERM CURRENT USE OF INSULIN: Primary | ICD-10-CM

## 2025-08-06 LAB
EXPIRATION DATE: ABNORMAL
GLUCOSE BLDC GLUCOMTR-MCNC: 121 MG/DL (ref 70–99)
HBA1C MFR BLD: 7.1 % (ref 4.5–5.7)
Lab: ABNORMAL

## 2025-08-06 PROCEDURE — 1160F RVW MEDS BY RX/DR IN RCRD: CPT | Performed by: NURSE PRACTITIONER

## 2025-08-06 PROCEDURE — 1159F MED LIST DOCD IN RCRD: CPT | Performed by: NURSE PRACTITIONER

## 2025-08-06 PROCEDURE — 3078F DIAST BP <80 MM HG: CPT | Performed by: NURSE PRACTITIONER

## 2025-08-06 PROCEDURE — 82948 REAGENT STRIP/BLOOD GLUCOSE: CPT | Performed by: NURSE PRACTITIONER

## 2025-08-06 PROCEDURE — 99214 OFFICE O/P EST MOD 30 MIN: CPT | Performed by: NURSE PRACTITIONER

## 2025-08-06 PROCEDURE — 83036 HEMOGLOBIN GLYCOSYLATED A1C: CPT | Performed by: NURSE PRACTITIONER

## 2025-08-06 PROCEDURE — G2211 COMPLEX E/M VISIT ADD ON: HCPCS | Performed by: NURSE PRACTITIONER

## 2025-08-06 PROCEDURE — 3075F SYST BP GE 130 - 139MM HG: CPT | Performed by: NURSE PRACTITIONER

## 2025-08-06 PROCEDURE — G0463 HOSPITAL OUTPT CLINIC VISIT: HCPCS | Performed by: NURSE PRACTITIONER

## 2025-08-06 RX ORDER — INSULIN GLARGINE 300 U/ML
50 INJECTION, SOLUTION SUBCUTANEOUS DAILY
Qty: 15 ML | Refills: 1 | Status: SHIPPED | OUTPATIENT
Start: 2025-08-06 | End: 2025-11-04

## 2025-08-06 RX ORDER — PAROXETINE 20 MG/1
20 TABLET, FILM COATED ORAL EVERY MORNING
Qty: 90 TABLET | Refills: 1 | Status: SHIPPED | OUTPATIENT
Start: 2025-08-06

## 2025-08-06 RX ORDER — DAPAGLIFLOZIN 10 MG/1
1 TABLET, FILM COATED ORAL DAILY
Qty: 90 TABLET | Refills: 1 | Status: SHIPPED | OUTPATIENT
Start: 2025-08-06 | End: 2026-02-02

## 2025-08-18 ENCOUNTER — OFFICE VISIT (OUTPATIENT)
Dept: ORTHOPEDIC SURGERY | Facility: CLINIC | Age: 80
End: 2025-08-18
Payer: MEDICARE

## 2025-08-18 VITALS — WEIGHT: 223 LBS | HEIGHT: 63 IN | BODY MASS INDEX: 39.51 KG/M2

## 2025-08-18 DIAGNOSIS — M16.11 PRIMARY OSTEOARTHRITIS OF RIGHT HIP: Primary | ICD-10-CM

## 2025-08-18 PROCEDURE — 1160F RVW MEDS BY RX/DR IN RCRD: CPT | Performed by: STUDENT IN AN ORGANIZED HEALTH CARE EDUCATION/TRAINING PROGRAM

## 2025-08-18 PROCEDURE — 1159F MED LIST DOCD IN RCRD: CPT | Performed by: STUDENT IN AN ORGANIZED HEALTH CARE EDUCATION/TRAINING PROGRAM

## 2025-08-18 PROCEDURE — 99213 OFFICE O/P EST LOW 20 MIN: CPT | Performed by: STUDENT IN AN ORGANIZED HEALTH CARE EDUCATION/TRAINING PROGRAM

## (undated) DEVICE — STERILE POLYISOPRENE POWDER-FREE SURGICAL GLOVES: Brand: PROTEXIS

## (undated) DEVICE — LAMINECTOMY CERVICAL DISC-LF: Brand: MEDLINE INDUSTRIES, INC.

## (undated) DEVICE — PENCL E/S HNDSWCH ROCKR CB

## (undated) DEVICE — SUT VIC 0/0 UR6 27IN DYED J603H

## (undated) DEVICE — GLV SURG BIOGEL LTX PF 7 1/2

## (undated) DEVICE — SLV SCD KN/LEN ADJ EXPRSS BLENDED MD 1P/U

## (undated) DEVICE — GAMMEX® NON-LATEX SIZE 7.5, STERILE NEOPRENE POWDER-FREE SURGICAL GLOVE: Brand: GAMMEX

## (undated) DEVICE — DRP MICROSCP LECIA W/CLEARLENS 137X381CM

## (undated) DEVICE — STERILE POLYISOPRENE POWDER-FREE SURGICAL GLOVES WITH EMOLLIENT COATING: Brand: PROTEXIS

## (undated) DEVICE — ANTIBACTERIAL UNDYED BRAIDED (POLYGLACTIN 910), SYNTHETIC ABSORBABLE SUTURE: Brand: COATED VICRYL